# Patient Record
Sex: FEMALE | Race: WHITE | Employment: OTHER | URBAN - METROPOLITAN AREA
[De-identification: names, ages, dates, MRNs, and addresses within clinical notes are randomized per-mention and may not be internally consistent; named-entity substitution may affect disease eponyms.]

---

## 2017-06-16 PROBLEM — R03.0 ELEVATED BLOOD PRESSURE READING: Chronic | Status: ACTIVE | Noted: 2017-06-16

## 2017-06-16 PROBLEM — I10 HYPERTENSION: Status: ACTIVE | Noted: 2017-06-16

## 2017-06-16 PROBLEM — R00.1 BRADYCARDIA: Status: ACTIVE | Noted: 2017-06-16

## 2017-07-04 ENCOUNTER — HOSPITAL ENCOUNTER (EMERGENCY)
Age: 82
Discharge: HOME | End: 2017-07-04
Attending: EMERGENCY MEDICINE
Payer: MEDICARE

## 2017-07-04 VITALS
BODY MASS INDEX: 21.17 KG/M2 | HEART RATE: 56 BPM | DIASTOLIC BLOOD PRESSURE: 74 MMHG | TEMPERATURE: 98.2 F | RESPIRATION RATE: 16 BRPM | WEIGHT: 124 LBS | OXYGEN SATURATION: 99 % | HEIGHT: 64 IN | SYSTOLIC BLOOD PRESSURE: 152 MMHG

## 2017-07-04 DIAGNOSIS — S91.111A LACERATION OF RIGHT GREAT TOE WITHOUT DAMAGE TO NAIL, FOREIGN BODY PRESENCE UNSPECIFIED, INITIAL ENCOUNTER: Primary | ICD-10-CM

## 2017-07-04 PROCEDURE — 99283 EMERGENCY DEPT VISIT LOW MDM: CPT | Performed by: EMERGENCY MEDICINE

## 2017-07-04 PROCEDURE — 90471 IMMUNIZATION ADMIN: CPT | Performed by: EMERGENCY MEDICINE

## 2017-07-04 PROCEDURE — 90715 TDAP VACCINE 7 YRS/> IM: CPT | Performed by: EMERGENCY MEDICINE

## 2017-07-04 PROCEDURE — 74011250636 HC RX REV CODE- 250/636: Performed by: EMERGENCY MEDICINE

## 2017-07-04 RX ADMIN — TETANUS TOXOID, REDUCED DIPHTHERIA TOXOID AND ACELLULAR PERTUSSIS VACCINE, ADSORBED 0.5 ML: 5; 2.5; 8; 8; 2.5 SUSPENSION INTRAMUSCULAR at 21:11

## 2017-07-04 NOTE — ED TRIAGE NOTES
Pt states that she cut her right great toe while in the swimming pool today.   Laceration continues to bleed

## 2017-07-05 NOTE — ED PROVIDER NOTES
HPI Comments: Patient is an 79-year-old female who reports earlier today she was swimming in a pool with her grandchildren when she noticed that she was bleeding from her right great toe. She states they try to control the bleeding at home but were unable to. She denies knowing exactly when she injured herself but does report that the bottom of the pool is quite rough. Unknown last tetanus. Patient takes a daily aspirin but otherwise no blood thinning medications. Patient is a [de-identified] y.o. female presenting with skin laceration. The history is provided by the patient. No  was used. Laceration           Past Medical History:   Diagnosis Date    Hypertension        No past surgical history on file. Family History:   Problem Relation Age of Onset    Cancer Mother     Heart Disease Father        Social History     Social History    Marital status:      Spouse name: N/A    Number of children: N/A    Years of education: N/A     Occupational History    Not on file. Social History Main Topics    Smoking status: Never Smoker    Smokeless tobacco: Not on file    Alcohol use No    Drug use: No    Sexual activity: Not on file     Other Topics Concern    Not on file     Social History Narrative         ALLERGIES: Penicillins and Sulfa (sulfonamide antibiotics)    Review of Systems   Constitutional: Negative for fatigue. Respiratory: Negative for shortness of breath. Gastrointestinal: Negative for abdominal pain. Skin:        Bleeding from right great toe       Vitals:    07/04/17 1922   BP: 160/61   Pulse: (!) 53   Resp: 16   Temp: 98.2 °F (36.8 °C)   SpO2: 99%   Weight: 56.2 kg (124 lb)   Height: 5' 4\" (1.626 m)            Physical Exam   Constitutional: She is oriented to person, place, and time. She appears well-developed and well-nourished. No distress. HENT:   Head: Normocephalic and atraumatic.    Eyes: Conjunctivae and EOM are normal. Pupils are equal, round, and reactive to light. Neck: Normal range of motion. Neck supple. Cardiovascular: Normal rate, regular rhythm and normal heart sounds. Pulmonary/Chest: Effort normal and breath sounds normal. No respiratory distress. She has no wheezes. She has no rales. Abdominal: Soft. She exhibits no distension. There is no tenderness. There is no rebound. Musculoskeletal: Normal range of motion. She exhibits no edema or tenderness. Neurological: She is alert and oriented to person, place, and time. Skin: Skin is warm and dry. No rash noted. She is not diaphoretic. No significant bleeding noted from the right great toe. There is possibly a small abrasion near her nail and then the bottom of her right great toe there is a small red area. Does not appear to be a puncture wound and does not open up. Psychiatric: She has a normal mood and affect. Her behavior is normal.   Vitals reviewed. MDM  Number of Diagnoses or Management Options  Laceration of right great toe without damage to nail, foreign body presence unspecified, initial encounter: new and does not require workup  Diagnosis management comments: No bleeding noted. The toe was cleaned and triple antibiotic appointment applied. Tetanus updated. Return precautions discussed regarding signs and symptoms of infection.        Amount and/or Complexity of Data Reviewed  Review and summarize past medical records: yes  Independent visualization of images, tracings, or specimens: yes    Risk of Complications, Morbidity, and/or Mortality  Presenting problems: moderate  Diagnostic procedures: moderate  Management options: moderate    Patient Progress  Patient progress: improved    ED Course       Procedures

## 2017-07-05 NOTE — DISCHARGE INSTRUCTIONS
Cuts: Care Instructions  Your Care Instructions  A cut can happen anywhere on your body. Stitches, staples, skin adhesives, or pieces of tape called Steri-Strips are sometimes used to keep the edges of a cut together and help it heal. Steri-Strips can be used by themselves or with stitches or staples. Sometimes cuts are left open. If the cut went deep and through the skin, the doctor may have closed the cut in two layers. A deeper layer of stitches brings the deep part of the cut together. These stitches will dissolve and don't need to be removed. The upper layer closure, which could be stitches, staples, Steri-Strips, or adhesive, is what you see on the cut. A cut is often covered by a bandage. The doctor has checked you carefully, but problems can develop later. If you notice any problems or new symptoms, get medical treatment right away. Follow-up care is a key part of your treatment and safety. Be sure to make and go to all appointments, and call your doctor if you are having problems. It's also a good idea to know your test results and keep a list of the medicines you take. How can you care for yourself at home? If a cut is open or closed  · Prop up the sore area on a pillow anytime you sit or lie down during the next 3 days. Try to keep it above the level of your heart. This will help reduce swelling. · Keep the cut dry for the first 24 to 48 hours. After this, you can shower if your doctor okays it. Pat the cut dry. · Don't soak the cut, such as in a bathtub. Your doctor will tell you when it's safe to get the cut wet. · After the first 24 to 48 hours, clean the cut with soap and water 2 times a day unless your doctor gives you different instructions. ¨ Don't use hydrogen peroxide or alcohol, which can slow healing. ¨ You may cover the cut with a thin layer of petroleum jelly and a nonstick bandage.   ¨ If the doctor put a bandage over the cut, put on a new bandage after cleaning the cut or if the bandage gets wet or dirty. · Avoid any activity that could cause your cut to reopen. · Be safe with medicines. Read and follow all instructions on the label. ¨ If the doctor gave you a prescription medicine for pain, take it as prescribed. ¨ If you are not taking a prescription pain medicine, ask your doctor if you can take an over-the-counter medicine. If the cut is closed with stitches, staples, or Steri-Strips  · Follow the above instructions for open or closed cuts. · Do not remove the stitches or staples on your own. Your doctor will tell you when to come back to have the stitches or staples removed. · Leave Steri-Strips on until they fall off. If the cut is closed with a skin adhesive  · Follow the above instructions for open or closed cuts. · Leave the skin adhesive on your skin until it falls off on its own. This may take 5 to 10 days. · Do not scratch, rub, or pick at the adhesive. · Do not put the sticky part of a bandage directly on the adhesive. · Do not put any kind of ointment, cream, or lotion over the area. This can make the adhesive fall off too soon. Do not use hydrogen peroxide or alcohol, which can slow healing. When should you call for help? Call your doctor now or seek immediate medical care if:  · You have new pain, or your pain gets worse. · The skin near the cut is cold or pale or changes color. · You have tingling, weakness, or numbness near the cut. · The cut starts to bleed, and blood soaks through the bandage. Oozing small amounts of blood is normal.  · You have trouble moving the area near the cut. · You have symptoms of infection, such as:  ¨ Increased pain, swelling, warmth, or redness around the cut. ¨ Red streaks leading from the cut. ¨ Pus draining from the cut. ¨ A fever. Watch closely for changes in your health, and be sure to contact your doctor if:  · The cut reopens. · You do not get better as expected. Where can you learn more?   Go to http://tea-gavino.info/. Enter M735 in the search box to learn more about \"Cuts: Care Instructions. \"  Current as of: March 20, 2017  Content Version: 11.3  © 6074-2691 [employer]. Care instructions adapted under license by [employer] (which disclaims liability or warranty for this information). If you have questions about a medical condition or this instruction, always ask your healthcare professional. [place] [address] any warranty or liability for your use of this information.

## 2017-11-21 PROBLEM — Z01.810 PREOP CARDIOVASCULAR EXAM: Status: ACTIVE | Noted: 2017-11-21

## 2018-01-02 ENCOUNTER — HOSPITAL ENCOUNTER (OUTPATIENT)
Dept: SURGERY | Age: 83
Discharge: HOME | End: 2018-01-02
Payer: MEDICARE

## 2018-01-02 ENCOUNTER — HOSPITAL ENCOUNTER (OUTPATIENT)
Dept: PHYSICAL THERAPY | Age: 83
Discharge: HOME | End: 2018-01-02
Payer: MEDICARE

## 2018-01-02 VITALS
RESPIRATION RATE: 14 BRPM | SYSTOLIC BLOOD PRESSURE: 126 MMHG | HEART RATE: 62 BPM | DIASTOLIC BLOOD PRESSURE: 57 MMHG | WEIGHT: 119.5 LBS | TEMPERATURE: 97 F | OXYGEN SATURATION: 99 % | HEIGHT: 64 IN | BODY MASS INDEX: 20.4 KG/M2

## 2018-01-02 LAB
ANION GAP SERPL CALC-SCNC: 5 MMOL/L (ref 7–16)
APPEARANCE UR: ABNORMAL
APTT PPP: 24.8 SEC (ref 23.2–35.3)
BACTERIA SPEC CULT: NORMAL
BACTERIA URNS QL MICRO: ABNORMAL /HPF
BILIRUB UR QL: NEGATIVE
BUN SERPL-MCNC: 19 MG/DL (ref 8–23)
CALCIUM SERPL-MCNC: 9.3 MG/DL (ref 8.3–10.4)
CASTS URNS QL MICRO: ABNORMAL /LPF
CHLORIDE SERPL-SCNC: 102 MMOL/L (ref 98–107)
CO2 SERPL-SCNC: 32 MMOL/L (ref 21–32)
COLOR UR: YELLOW
CREAT SERPL-MCNC: 0.65 MG/DL (ref 0.6–1)
EPI CELLS #/AREA URNS HPF: ABNORMAL /HPF
ERYTHROCYTE [DISTWIDTH] IN BLOOD BY AUTOMATED COUNT: 13.6 % (ref 11.9–14.6)
GLUCOSE SERPL-MCNC: 91 MG/DL (ref 65–100)
GLUCOSE UR STRIP.AUTO-MCNC: NEGATIVE MG/DL
HCT VFR BLD AUTO: 37.4 % (ref 35.8–46.3)
HGB BLD-MCNC: 12 G/DL (ref 11.7–15.4)
HGB UR QL STRIP: ABNORMAL
INR PPP: 1
KETONES UR QL STRIP.AUTO: NEGATIVE MG/DL
LEUKOCYTE ESTERASE UR QL STRIP.AUTO: ABNORMAL
MCH RBC QN AUTO: 29.9 PG (ref 26.1–32.9)
MCHC RBC AUTO-ENTMCNC: 32.1 G/DL (ref 31.4–35)
MCV RBC AUTO: 93.3 FL (ref 79.6–97.8)
NITRITE UR QL STRIP.AUTO: NEGATIVE
PH UR STRIP: 6.5 [PH] (ref 5–9)
PLATELET # BLD AUTO: 168 K/UL (ref 150–450)
PMV BLD AUTO: 12 FL (ref 10.8–14.1)
POTASSIUM SERPL-SCNC: 3.9 MMOL/L (ref 3.5–5.1)
PROT UR STRIP-MCNC: NEGATIVE MG/DL
PROTHROMBIN TIME: 12.8 SEC (ref 11.5–14.5)
RBC # BLD AUTO: 4.01 M/UL (ref 4.05–5.25)
RBC #/AREA URNS HPF: ABNORMAL /HPF
SERVICE CMNT-IMP: NORMAL
SODIUM SERPL-SCNC: 139 MMOL/L (ref 136–145)
SP GR UR REFRACTOMETRY: 1.02 (ref 1–1.02)
UROBILINOGEN UR QL STRIP.AUTO: 0.2 EU/DL (ref 0.2–1)
WBC # BLD AUTO: 5.4 K/UL (ref 4.3–11.1)
WBC URNS QL MICRO: ABNORMAL /HPF

## 2018-01-02 PROCEDURE — 85610 PROTHROMBIN TIME: CPT | Performed by: ORTHOPAEDIC SURGERY

## 2018-01-02 PROCEDURE — G8979 MOBILITY GOAL STATUS: HCPCS

## 2018-01-02 PROCEDURE — 36415 COLL VENOUS BLD VENIPUNCTURE: CPT | Performed by: ORTHOPAEDIC SURGERY

## 2018-01-02 PROCEDURE — 80048 BASIC METABOLIC PNL TOTAL CA: CPT | Performed by: ORTHOPAEDIC SURGERY

## 2018-01-02 PROCEDURE — 85027 COMPLETE CBC AUTOMATED: CPT | Performed by: ORTHOPAEDIC SURGERY

## 2018-01-02 PROCEDURE — G8978 MOBILITY CURRENT STATUS: HCPCS

## 2018-01-02 PROCEDURE — G8980 MOBILITY D/C STATUS: HCPCS

## 2018-01-02 PROCEDURE — 97161 PT EVAL LOW COMPLEX 20 MIN: CPT

## 2018-01-02 PROCEDURE — 81001 URINALYSIS AUTO W/SCOPE: CPT | Performed by: ORTHOPAEDIC SURGERY

## 2018-01-02 PROCEDURE — 77030027138 HC INCENT SPIROMETER -A

## 2018-01-02 PROCEDURE — 87641 MR-STAPH DNA AMP PROBE: CPT | Performed by: ORTHOPAEDIC SURGERY

## 2018-01-02 PROCEDURE — 85730 THROMBOPLASTIN TIME PARTIAL: CPT | Performed by: ORTHOPAEDIC SURGERY

## 2018-01-02 RX ORDER — ASPIRIN 81 MG/1
81 TABLET ORAL DAILY
COMMUNITY
End: 2018-01-26

## 2018-01-02 RX ORDER — BISMUTH SUBSALICYLATE 262 MG
1 TABLET,CHEWABLE ORAL DAILY
COMMUNITY

## 2018-01-02 NOTE — PERIOP NOTES
Lab results within limits per anesthesia protocol; OK for surgery.      Recent Results (from the past 12 hour(s))   CBC W/O DIFF    Collection Time: 01/02/18 12:48 PM   Result Value Ref Range    WBC 5.4 4.3 - 11.1 K/uL    RBC 4.01 (L) 4.05 - 5.25 M/uL    HGB 12.0 11.7 - 15.4 g/dL    HCT 37.4 35.8 - 46.3 %    MCV 93.3 79.6 - 97.8 FL    MCH 29.9 26.1 - 32.9 PG    MCHC 32.1 31.4 - 35.0 g/dL    RDW 13.6 11.9 - 14.6 %    PLATELET 992 587 - 472 K/uL    MPV 12.0 10.8 - 05.8 FL   METABOLIC PANEL, BASIC    Collection Time: 01/02/18 12:48 PM   Result Value Ref Range    Sodium 139 136 - 145 mmol/L    Potassium 3.9 3.5 - 5.1 mmol/L    Chloride 102 98 - 107 mmol/L    CO2 32 21 - 32 mmol/L    Anion gap 5 (L) 7 - 16 mmol/L    Glucose 91 65 - 100 mg/dL    BUN 19 8 - 23 MG/DL    Creatinine 0.65 0.6 - 1.0 MG/DL    GFR est AA >60 >60 ml/min/1.73m2    GFR est non-AA >60 >60 ml/min/1.73m2    Calcium 9.3 8.3 - 10.4 MG/DL   PROTHROMBIN TIME + INR    Collection Time: 01/02/18 12:48 PM   Result Value Ref Range    Prothrombin time 12.8 11.5 - 14.5 sec    INR 1.0     PTT    Collection Time: 01/02/18 12:48 PM   Result Value Ref Range    aPTT 24.8 23.2 - 35.3 SEC   URINALYSIS W/ RFLX MICROSCOPIC    Collection Time: 01/02/18 12:48 PM   Result Value Ref Range    Color YELLOW      Appearance CLOUDY      Specific gravity 1.020 1.001 - 1.023      pH (UA) 6.5 5.0 - 9.0      Protein NEGATIVE  NEG mg/dL    Glucose NEGATIVE  mg/dL    Ketone NEGATIVE  NEG mg/dL    Bilirubin NEGATIVE  NEG      Blood SMALL (A) NEG      Urobilinogen 0.2 0.2 - 1.0 EU/dL    Nitrites NEGATIVE  NEG      Leukocyte Esterase LARGE (A) NEG      WBC 10-20 0 /hpf    RBC 0-3 0 /hpf    Epithelial cells 3-5 0 /hpf    Bacteria TRACE 0 /hpf    Casts 0-3 0 /lpf

## 2018-01-02 NOTE — PERIOP NOTES
Patient verified name, , and surgery as listed in [facility]. Type III surgery, walk in assessment complete. Labs per surgeon: cbc,bmp,PT/PTT and UA ; results pending  Labs per anesthesia protocol: type & screen DOS    EKG:performed by cardiologist. EKG (2017)Also,echo (2017) and cardiology note (17) in EMR for reference. Per Dr. Kelvin Bentley 17: Preoperative cardiovascular evaluation- lower risk for knee replacement-  continue beta blocker and other BP meds as currently taking. Take 81mg ASA thru surgery if OK with  Dr. Melissa Muhammad.     Follow-up Disposition:   Return in about 6 months (around 2018).     Michael Gabriel MD   2017      Hibiclens and instructions to return bottle on DOS given per hospital policy. Nasal Swab collected per MD order and instructions for Mupirocin nasal ointment if required. Patient provided with handouts including Guide to Surgery, Pain Management, Hand Hygiene, Blood Transfusion Education, and [facility] Brochure. Patient answered medical/surgical history questions at their best of ability. All prior to admission medications documented in [facility]. Original medication prescription bottle  visualized during patient appointment. Patient instructed to hold all vitamins 7 days prior to surgery and NSAIDS 5 days prior to surgery. Medications to be held: vitamins/supplements    Patient instructed to continue previous medications as prescribed prior to surgery and to take the following medications the day of surgery according to anesthesia guidelines with a small sip of water: amlodipine,citalopram and aspirin 81mg. Patient teach back successful and patient demonstrates knowledge of instruction.

## 2018-01-02 NOTE — PROGRESS NOTES
01/02/18 1200   Oxygen Therapy   O2 Sat (%) 99 %   Pulse via Oximetry 57 beats per minute   O2 Device Room air   Pre-Treatment   Breath Sounds Bilateral Clear   Pre FEV1 (liters) 1.5 liters   % Predicted 72   Incentive Spirometry Treatment   Actual Volume (ml) 1500 ml     Initial respiratory Assessment completed with pt. Pt was interviewed and evaluated in [facility] prior to surgery. Patient ID:  Angela Astorga  [id number]  [de-identified] y.o.  1935  Surgeon: Dr. Clifton Mejia  Date of Surgery: 1/24/2018  Procedure: Total Right Knee Arthroplasty  Primary Care Physician: Winter Pereira -[phone]  Specialists:                                  Pt instructed in the use of Incentive Spirometry. Pt instructed to bring Incentive Spirometer back on date of surgery & to start using Is upon return to pt room. Pt taught proper cough technique      History of smoking:  [address]   Quit date:    Secondhand smoke:NONE      Past procedures with Oxygen desaturation:NONE    Past Medical History:   Diagnosis Date    Arthritis     osteo    Hypertension     Psychiatric disorder     anxiety- citalopram daily                                                                                                                                                         Respiratory history:                                    DENIES SOB                                  Respiratory meds:                                         FAMILY PRESENT:            SPOUSE,                                             PAST SLEEP STUDY:              NO- PT WAS SCHEDULED TO SLEEP STUDY BUT SHE CANCELLED APPOINTMENT  HX OF ILENE:                          NO                                     ILENE assessment:                                                                                                 PHYSICAL EXAM   Body mass index is 20.51 kg/(m^2).    Visit Vitals    /57 (BP 1 Location: Left arm, BP Patient Position: Sitting)    Pulse 62    Temp 97 °F (36.1 °C)    Resp 14    Ht 5' 4\" (1.626 m)    Wt 54.2 kg (119 lb 8 oz)    SpO2 (P) 99%    BMI 20.51 kg/m2     Neck circumference: 37     cm    Loud snoring:YES       Witnessed apnea or wakening gasping or choking: , APNEA    Awakens with headaches:DENIES    Morning or daytime tiredness/ sleepiness:    TIRED     Dry mouth or sore throat in morning:YES    Barrientos stage:3    SACS score:16    STOP/BAN                              CPAP:NONE                CONT SAT HS             Referrals:    Pt. Phone Number:

## 2018-01-02 NOTE — PROGRESS NOTES
Stew Amos  : (89 y.o.) [facility] at [address], [place] [address].  [phone]       Physical Therapy Prehab Plan of Treatment and Evaluation Summary:2018    ICD-10: Treatment Diagnosis:   · Pain in Right Knee (M25.561)  · Stiffness of Right Knee, Not elsewhere classified (M25.661)  · Difficulty in walking, Not elsewhere classified (R26.2)  Precautions/Allergies:   Hydrocodone; Penicillins; and Sulfa (sulfonamide antibiotics)  MEDICAL/REFERRING DIAGNOSIS:  Unilateral primary osteoarthritis, right knee [M17.11]  REFERRING PHYSICIAN: Lore Elmore., *  DATE OF SURGERY: 18   Assessment:   Comments:  Pt. Plans to go to rehab.  is elderly and she is worried he can't help her. She was given list of facilities and our  phone number. She reports needing a left tka. She uses a cane occasionally. PROBLEM LIST (Impacting functional limitations):  Ms. Leny Hudson presents with the following right lower extremity(s) problems:  1. Strength  2. Range of Motion  3. Home Exercise Program  4. Pain   INTERVENTIONS PLANNED:  1. Home Exercise Program  2. Educational Discussion     TREATMENT PLAN: Effective Dates: 2018 TO 2018. Frequency/Duration: Patient to continue to perform home exercise program at least twice per day up until her surgery. GOALS: (Goals have been discussed and agreed upon with patient.)  Discharge Goals: Time Frame: 1 Day  1. Patient will demonstrate independence with a home exercise program designed to increase strength, range of motion and pain control to minimize functional deficits and optimize patient for total joint replacement. Rehabilitation Potential For Stated Goals: Good  Regarding Liz White's therapy, I certify that the treatment plan above will be carried out by a therapist or under their direction.   Thank you for this referral,  Onur Toledo, PT               HISTORY:   Present Symptoms:  Pain Intensity 1:  (7 at worst)  Pain Location 1: Knee   History of Present Injury/Illness (Reason for Referral):  Medical/Referring Diagnosis: Unilateral primary osteoarthritis, right knee [M17.11]   Past Medical History/Comorbidities:   Ms. Roberto Jolley  has a past medical history of Arthritis; Hypertension; and Psychiatric disorder. She also has no past medical history of Aneurysm ([facility] [address] Arrhythmia; Asthma; Autoimmune disease ([facility] [address] CAD (coronary artery disease); Cancer ([facility] [address] Chronic kidney disease; Chronic obstructive pulmonary disease ([facility] [address] Chronic pain; Coagulation disorder ([facility] [address] Diabetes ([facility] [address] Difficult intubation; Endocarditis; GERD (gastroesophageal reflux disease); Heart failure ([facility] [address] Liver disease; Malignant hyperthermia due to anesthesia; Morbid obesity ([facility] [address] Nausea & vomiting; Nicotine vapor product user; Non-nicotine vapor product user; Pseudocholinesterase deficiency; PUD (peptic ulcer disease); Rheumatic fever; Seizures ([facility] [address] Sleep apnea; Stroke [facility]); Thromboembolus ([facility] [address] or Thyroid disease. Ms. Roberto Jolley  has a past surgical history that includes hx knee arthroscopy (Right) and hx colonoscopy.   Social History/Living Environment:   Home Environment: Private residence  # Steps to Enter: 0  One/Two Story Residence: One story  Living Alone: No  Support Systems: Spouse/Significant Other/Partner  Patient Expects to be Discharged to[de-identified] Skilled nursing facility  Current DME Used/Available at Home: [employer], straight, Other (comment) (high commode)  Tub or Shower Type: Shower  Work/Activity:  retired  Dominant Side:  RIGHT  Current Medications:  See Pre-assessment nursing note   Number of Personal Factors/Comorbidities that affect the Plan of Care: 1-2: MODERATE COMPLEXITY   EXAMINATION:   ADLs (Current Functional Status):   Ambulation:  [x] Independent  [] Walk Indoors Only  [] Walk Outdoors  [] Use Assistive Device  [] Use Wheelchair Only Dressing:  [x] Independent  Requires Assistance from Someone for:  [] Sock/Shoes  [] Pants  [] Everything   Bathing/Showering:   [x] Independent  [] Requires Assistance from Someone  [] Sponge Bath Only Household Activities:  [x] Routine house and yard work  [] Light Housework Only  [] None   Observation/Orthostatic Postural Assessment:       ROM/Flexibility:   Gross Assessment: Yes  AROM: Within functional limits (left LE)                       RLE Assessment  RLE Assessment (WDL): Exceptions to WDL  RLE AROM  R Knee Flexion: 132  R Knee Extension: 5   Strength:   Gross Assessment: Yes  Strength: Generally decreased, functional (left LE)              RLE Strength  R Knee Flexion: 4  R Knee Extension: 4   Functional Mobility:    Gross Assessment: Yes    Gait Description (WDL): Exceptions to WDL  Stand to Sit: Independent, Additional time  Sit to Stand: Independent, Additional time  Distance (ft): 250 Feet (ft)  Ambulation - Level of Assistance: Independent  Speed/Joseline: Delayed  Stance: Right decreased  Gait Abnormalities: Antalgic          Balance:    Sitting: Intact  Standing: Intact   Body Structures Involved:  1. Bones  2. Joints  3. Muscles  4. Ligaments Body Functions Affected:  1. Movement Related Activities and Participation Affected:  1. Mobility   Number of elements that affect the Plan of Care: 1-2: LOW COMPLEXITY   CLINICAL PRESENTATION:   Presentation: Stable and uncomplicated: LOW COMPLEXITY   CLINICAL DECISION MAKING:   Outcome Measure: Tool Used: Lower Extremity Functional Scale (LEFS)  Score:  Initial: 53/80 Most Recent: X/80 (Date: -- )   Interpretation of Score: 20 questions each scored on a 5 point scale with 0 representing \"extreme difficulty or unable to perform\" and 4 representing \"no difficulty\". The lower the score, the greater the functional disability. 80/80 represents no disability. Minimal detectable change is 9 points. Score 80 79-65 64-49 48-33 32-17 16-1 0   Modifier CH CI CJ CK CL CM CN     ?  Mobility - Walking and Moving Around:     - CURRENT STATUS: CJ - 20%-39% impaired, limited or restricted    - GOAL STATUS: CJ - 20%-39% impaired, limited or restricted    - D/C STATUS:  CJ - 20%-39% impaired, limited or restricted  Medical Necessity:   · Ms. Roberto Jolley is expected to optimize her lower extremity strength and ROM in preparation for joint replacement surgery. Reason for Services/Other Comments:  · Achieve baseline assesment of musculoskeletal system, functional mobility and home environment. , educate in PT HEP in preparation for surgery, educate in hospital plan of care. Use of outcome tool(s) and clinical judgement create a POC that gives a: Clear prediction of patient's progress: LOW COMPLEXITY   TREATMENT:   Treatment/Session Assessment:  Patient was instructed in PT- HEP to increase strength and ROM in LEs. Answered all questions. · Post session pain:  No complaints  · Compliance with Program/Exercises: compliant most of the time.   Total Treatment Duration:  PT Patient Time In/Time Out  Time In: 1220  Time Out: [address], PT

## 2018-01-03 NOTE — PERIOP NOTES
1/2/2018  9:46 PM - [place], Lab In [facility]   Component Results   Component Value Flag Ref Range Units Status   Special Requests: NO SPECIAL REQUESTS     Final   Culture result:      Final   SA target not detected.                                 A MRSA NEGATIVE, SA NEGATIVE test result does not preclude MRSA or SA nasal colonization.

## 2018-01-08 NOTE — ADVANCED PRACTICE NURSE
Total Joint Surgery Preoperative Chart Review      Patient ID:  Abdifatah Kwong  [id number]  [de-identified] y.o.  1935  Surgeon: Dr. Oneida Cooper  Date of Surgery: 1/24/2018  Procedure: Total Right Knee Arthroplasty  Primary Care Physician: Crispin Joseph [phone]  Specialty Physician(s):      Subjective:   Abdifatah Kwong is a [de-identified] y.o. WHITE OR  female who presents for preoperative evaluation for Total Right Knee arthroplasty. This is a preoperative chart review note based on data collected by the nurse at the surgical Pre-Assessment visit. Past Medical History:   Diagnosis Date    Arthritis     osteo    Hypertension     Psychiatric disorder     anxiety- citalopram daily      Past Surgical History:   Procedure Laterality Date    HX COLONOSCOPY      HX KNEE ARTHROSCOPY Right      Family History   Problem Relation Age of Onset    Cancer Mother     Heart Disease Father       Social History   Substance Use Topics    Smoking status: Never Smoker    Smokeless tobacco: Never Used    Alcohol use 1.2 oz/week     2 Glasses of wine per week       Prior to Admission medications    Medication Sig Start Date End Date Taking? Authorizing Provider   multivitamin (ONE A DAY) tablet Take 1 Tab by mouth daily. Yes Historical Provider   aspirin delayed-release 81 mg tablet Take 81 mg by mouth daily. Per anesthesia protocol:instructed to take am of surgery. Yes Historical Provider   Omega-3-DHA-EPA-Fish Oil (FISH OIL) 1,000 mg (120 mg-180 mg) cap Take  by mouth. Yes Historical Provider   calcium-cholecalciferol, d3, (CALCIUM 600 + D) 600-125 mg-unit tab Take  by mouth. Yes Historical Provider   metoprolol succinate (TOPROL-XL) 25 mg XL tablet Take 25 mg by mouth nightly. Per anesthesia protocol:instructed to take am of surgery.   Indications: hypertension   Yes Historical Provider   temazepam (RESTORIL) 15 mg capsule TAKE ONE CAPSULE BY MOUTH AT BEDTIME,as needed 10/31/17  Yes Historical Provider losartan-hydroCHLOROthiazide (HYZAAR) 100-25 mg per tablet Take 1 Tab by mouth daily. 8/29/17  Yes Dayna Sanchez MD   amLODIPine (NORVASC) 2.5 mg tablet Take 1 Tab by mouth daily. Patient taking differently: Take 2.5 mg by mouth daily. Per anesthesia protocol:instructed to take am of surgery. 8/29/17  Yes Dayna Sanchez MD   citalopram (CELEXA) 10 mg tablet Take 10 mg by mouth daily. Per anesthesia protocol:instructed to take am of surgery. Indications: ANXIETY WITH DEPRESSION   Yes Historical Provider     Allergies   Allergen Reactions    Hydrocodone Unknown (comments)     \"too strong\"    Penicillins Itching    Sulfa (Sulfonamide Antibiotics) Itching          Objective:     Physical Exam:     Visit Vitals    /57 (BP 1 Location: Left arm, BP Patient Position: Sitting)    Pulse 62    Temp 97 °F (36.1 °C)    Resp 14    Ht 5' 4\" (1.626 m)    Wt 54.2 kg (119 lb 8 oz)    SpO2 (P) 99%    BMI 20.51 kg/m2       ECG:    EKG Results     None          Data Review:   Labs:   reviewed      Problem List:  )  Patient Active Problem List   Diagnosis Code    Malaise and fatigue R53.81, R53.83    Diaphoresis/hyperhidrosis R61    Abnormal EKG R94.31    Bradycardia R00.1    Elevated blood pressure reading R03.0    Preop cardiovascular exam Z01.810       Total Joint Surgery Pre-Assessment Recommendations:           He/she is a moderate risk for sleep apnea but refuses to have additional work up at this time. Will initiate perioperative ILENE precautions. Recommend continuous saturation monitoring hours of sleep, during hospitalization.             Signed By: SANAM Barnes    January 8, 2018

## 2018-01-18 NOTE — H&P
[id number] [facility]  Pre Operative History and Physical Exam    Patient ID:  Jennifer Kaminski  [id number]  [de-identified] y.o.  1935    Today: January 18, 2018       Assessment:   1. Arthritis of the right knee        Plan:    1. Proceed with scheduled Procedure(s) (LRB):  RIGHT KNEE ARTHROPLASTY TOTAL / FNB / CEFERINO (Right)            CC:  Right knee pain    HPI:   The patient has end stage arthritis of the right knee. The patient was evaluated and examined during a consultation prior to this office visit. There have been no changes to the patient's orthopedic condition since the initial consultation. The patient has failed previous conservative treatment for this condition including antiinflammatories , and lifestyle modifications. The necessity for joint replacement is present. The patient will be admitted the day of surgery for Procedure(s) (LRB):  RIGHT KNEE ARTHROPLASTY TOTAL / FNB / CEFERINO (Right)      Past Medical/Surgical History:  Past Medical History:   Diagnosis Date    Arthritis     osteo    Hypertension     Psychiatric disorder     anxiety- citalopram daily     Past Surgical History:   Procedure Laterality Date    HX COLONOSCOPY      HX KNEE ARTHROSCOPY Right         Allergies: Allergies   Allergen Reactions    Hydrocodone Unknown (comments)     \"too strong\"    Penicillins Itching    Sulfa (Sulfonamide Antibiotics) Itching        Objective:                    HEENT: NC/AT                   Lungs:  Unlabored respirations, clear breath sounds                    Heart:   RRR                    Abdomen: soft                   Extremities:  Pain with ROM of the right knee    Meds:   No current facility-administered medications for this encounter. Current Outpatient Prescriptions   Medication Sig    multivitamin (ONE A DAY) tablet Take 1 Tab by mouth daily.  aspirin delayed-release 81 mg tablet Take 81 mg by mouth daily. Per anesthesia protocol:instructed to take am of surgery.     Omega-3-DHA-EPA-Fish Oil (FISH OIL) 1,000 mg (120 mg-180 mg) cap Take  by mouth.  calcium-cholecalciferol, d3, (CALCIUM 600 + D) 600-125 mg-unit tab Take  by mouth.  metoprolol succinate (TOPROL-XL) 25 mg XL tablet Take 25 mg by mouth nightly. Per anesthesia protocol:instructed to take am of surgery. Indications: hypertension    temazepam (RESTORIL) 15 mg capsule TAKE ONE CAPSULE BY MOUTH AT BEDTIME,as needed    losartan-hydroCHLOROthiazide (HYZAAR) 100-25 mg per tablet Take 1 Tab by mouth daily.  amLODIPine (NORVASC) 2.5 mg tablet Take 1 Tab by mouth daily. (Patient taking differently: Take 2.5 mg by mouth daily. Per anesthesia protocol:instructed to take am of surgery.)    citalopram (CELEXA) 10 mg tablet Take 10 mg by mouth daily. Per anesthesia protocol:instructed to take am of surgery. Indications: ANXIETY WITH DEPRESSION         Labs:  Hospital Outpatient Visit on 01/02/2018   Component Date Value Ref Range Status    WBC 01/02/2018 5.4  4.3 - 11.1 K/uL Final    RBC 01/02/2018 4.01* 4.05 - 5.25 M/uL Final    HGB 01/02/2018 12.0  11.7 - 15.4 g/dL Final    HCT 01/02/2018 37.4  35.8 - 46.3 % Final    MCV 01/02/2018 93.3  79.6 - 97.8 FL Final    MCH 01/02/2018 29.9  26.1 - 32.9 PG Final    MCHC 01/02/2018 32.1  31.4 - 35.0 g/dL Final    RDW 01/02/2018 13.6  11.9 - 14.6 % Final    PLATELET 06/79/9573 391  150 - 450 K/uL Final    MPV 01/02/2018 12.0  10.8 - 14.1 FL Final    Sodium 01/02/2018 139  136 - 145 mmol/L Final    Potassium 01/02/2018 3.9  3.5 - 5.1 mmol/L Final    Chloride 01/02/2018 102  98 - 107 mmol/L Final    CO2 01/02/2018 32  21 - 32 mmol/L Final    Anion gap 01/02/2018 5* 7 - 16 mmol/L Final    Glucose 01/02/2018 91  65 - 100 mg/dL Final    Comment: 47 - 60 mg/dl Consistent with, but not fully diagnostic of hypoglycemia.   101 - 125 mg/dl Impaired fasting glucose/consistent with pre-diabetes mellitus  > 126 mg/dl Fasting glucose consistent with overt diabetes mellitus  BUN 01/02/2018 19  8 - 23 MG/DL Final    Creatinine 01/02/2018 0.65  0.6 - 1.0 MG/DL Final    GFR est AA 01/02/2018 >60  >60 ml/min/1.73m2 Final    GFR est non-AA 01/02/2018 >60  >60 ml/min/1.73m2 Final    Comment: (NOTE)  Estimated GFR is calculated using the Modification of Diet in Renal   Disease (MDRD) Study equation, reported for both  Americans   (GFRAA) and non- Americans (GFRNA), and normalized to 1.73m2   body surface area. The physician must decide which value applies to   the patient. The MDRD study equation should only be used in   individuals age 25 or older. It has not been validated for the   following: pregnant women, patients with serious comorbid conditions,   or on certain medications, or persons with extremes of body size,   muscle mass, or nutritional status.       Calcium 01/02/2018 9.3  8.3 - 10.4 MG/DL Final    Prothrombin time 01/02/2018 12.8  11.5 - 14.5 sec Final    ** Note new reference range and method **    INR 01/02/2018 1.0    Final    Comment: Suggested therapeutic INR range:  Venous thrombosis and embolus  2.0-3.0  Prosthetic heart valve         2.5-3.5  ** Note new reference range and method **      aPTT 01/02/2018 24.8  23.2 - 35.3 SEC Final    Comment: Heparin Therapeutic Range = 74 - 123 seconds  In addition to factor deficiency, monitoring heparin therapy, etc., evaluation of a prolonged aPTT result should include consideration of preanalytic variables such as heparin flush contamination, specimen integrity issues, etc.  ** Note new reference range and method **      Color 01/02/2018 YELLOW    Final    Appearance 01/02/2018 CLOUDY    Final    Specific gravity 01/02/2018 1.020  1.001 - 1.023   Final    pH (UA) 01/02/2018 6.5  5.0 - 9.0   Final    Protein 01/02/2018 NEGATIVE   NEG mg/dL Final    Glucose 01/02/2018 NEGATIVE   mg/dL Final    Ketone 01/02/2018 NEGATIVE   NEG mg/dL Final    Bilirubin 01/02/2018 NEGATIVE   NEG   Final    Blood 01/02/2018 SMALL* NEG   Final    Urobilinogen 01/02/2018 0.2  0.2 - 1.0 EU/dL Final    Nitrites 01/02/2018 NEGATIVE   NEG   Final    Leukocyte Esterase 01/02/2018 LARGE* NEG   Final    WBC 01/02/2018 10-20  0 /hpf Final    RESULTS VERIFIED MANUALLY    RBC 01/02/2018 0-3  0 /hpf Final    RESULTS VERIFIED MANUALLY    Epithelial cells 01/02/2018 3-5  0 /hpf Final    RESULTS VERIFIED MANUALLY    Bacteria 01/02/2018 TRACE  0 /hpf Final    RESULTS VERIFIED MANUALLY    Casts 01/02/2018 0-3  0 /lpf Final    Comment: HYALINE  RESULTS VERIFIED MANUALLY      Special Requests: 01/02/2018 NO SPECIAL REQUESTS    Final    Culture result: 01/02/2018 SA target not detected. A MRSA NEGATIVE, SA NEGATIVE test result does not preclude MRSA or SA nasal colonization.     Final                 Patient Active Problem List   Diagnosis Code    Malaise and fatigue R53.81, R53.83    Diaphoresis/hyperhidrosis R61    Abnormal EKG R94.31    Bradycardia R00.1    Elevated blood pressure reading R03.0    Preop cardiovascular exam Z01.810         Signed By: JACEK Crooks  January 18, 2018

## 2018-01-23 ENCOUNTER — ANESTHESIA EVENT (OUTPATIENT)
Dept: SURGERY | Age: 83
DRG: 470 | End: 2018-01-23
Payer: MEDICARE

## 2018-01-24 ENCOUNTER — HOSPITAL ENCOUNTER (INPATIENT)
Age: 83
LOS: 2 days | Discharge: NURSING, REHAB OR LONG-TERM CARE | DRG: 470 | End: 2018-01-26
Attending: ORTHOPAEDIC SURGERY | Admitting: ORTHOPAEDIC SURGERY
Payer: MEDICARE

## 2018-01-24 ENCOUNTER — ANESTHESIA (OUTPATIENT)
Dept: SURGERY | Age: 83
DRG: 470 | End: 2018-01-24
Payer: MEDICARE

## 2018-01-24 DIAGNOSIS — Z96.651 STATUS POST TOTAL RIGHT KNEE REPLACEMENT: Primary | ICD-10-CM

## 2018-01-24 DIAGNOSIS — R03.0 ELEVATED BLOOD PRESSURE READING: Chronic | ICD-10-CM

## 2018-01-24 PROBLEM — R00.1 BRADYCARDIA: Chronic | Status: ACTIVE | Noted: 2017-06-16

## 2018-01-24 LAB
ABO + RH BLD: NORMAL
APPEARANCE UR: CLEAR
BACTERIA URNS QL MICRO: 0 /HPF
BILIRUB UR QL: NEGATIVE
BLOOD GROUP ANTIBODIES SERPL: NORMAL
COLOR UR: YELLOW
GLUCOSE BLD STRIP.AUTO-MCNC: 96 MG/DL (ref 65–100)
GLUCOSE UR STRIP.AUTO-MCNC: NEGATIVE MG/DL
HGB BLD-MCNC: 9.6 G/DL (ref 11.7–15.4)
HGB UR QL STRIP: NEGATIVE
KETONES UR QL STRIP.AUTO: NEGATIVE MG/DL
LEUKOCYTE ESTERASE UR QL STRIP.AUTO: NEGATIVE
NITRITE UR QL STRIP.AUTO: NEGATIVE
PH UR STRIP: 7 [PH] (ref 5–9)
PROT UR STRIP-MCNC: NEGATIVE MG/DL
SP GR UR REFRACTOMETRY: 1.01 (ref 1–1.02)
SPECIMEN EXP DATE BLD: NORMAL
UROBILINOGEN UR QL STRIP.AUTO: 0.2 EU/DL (ref 0.2–1)

## 2018-01-24 PROCEDURE — 77030031139 HC SUT VCRL2 J&J -A: Performed by: ORTHOPAEDIC SURGERY

## 2018-01-24 PROCEDURE — 97161 PT EVAL LOW COMPLEX 20 MIN: CPT

## 2018-01-24 PROCEDURE — 77030035236 HC SUT PDS STRATFX BARB J&J -B: Performed by: ORTHOPAEDIC SURGERY

## 2018-01-24 PROCEDURE — 77030036688 HC BLNKT CLD THER S2SG -B

## 2018-01-24 PROCEDURE — 86580 TB INTRADERMAL TEST: CPT | Performed by: ORTHOPAEDIC SURGERY

## 2018-01-24 PROCEDURE — 74011250637 HC RX REV CODE- 250/637: Performed by: PHYSICIAN ASSISTANT

## 2018-01-24 PROCEDURE — 77030018836 HC SOL IRR NACL ICUM -A: Performed by: ORTHOPAEDIC SURGERY

## 2018-01-24 PROCEDURE — 77030006789 HC BLD SAW OSC STRY -C: Performed by: ORTHOPAEDIC SURGERY

## 2018-01-24 PROCEDURE — 74011250636 HC RX REV CODE- 250/636: Performed by: PHYSICIAN ASSISTANT

## 2018-01-24 PROCEDURE — 74011000302 HC RX REV CODE- 302: Performed by: ORTHOPAEDIC SURGERY

## 2018-01-24 PROCEDURE — 36415 COLL VENOUS BLD VENIPUNCTURE: CPT | Performed by: PHYSICIAN ASSISTANT

## 2018-01-24 PROCEDURE — 74011250636 HC RX REV CODE- 250/636: Performed by: ANESTHESIOLOGY

## 2018-01-24 PROCEDURE — 77030007880 HC KT SPN EPDRL BBMI -B: Performed by: ANESTHESIOLOGY

## 2018-01-24 PROCEDURE — 77030025452 HC KT TIB SZR TRTH DSP STRY -B: Performed by: ORTHOPAEDIC SURGERY

## 2018-01-24 PROCEDURE — 74011250636 HC RX REV CODE- 250/636

## 2018-01-24 PROCEDURE — 0SRC0JA REPLACEMENT OF RIGHT KNEE JOINT WITH SYNTHETIC SUBSTITUTE, UNCEMENTED, OPEN APPROACH: ICD-10-PCS | Performed by: ORTHOPAEDIC SURGERY

## 2018-01-24 PROCEDURE — 74011000250 HC RX REV CODE- 250

## 2018-01-24 PROCEDURE — 77030037364 HC TIB INST CR  DISP STRY -C: Performed by: ORTHOPAEDIC SURGERY

## 2018-01-24 PROCEDURE — 77030011208: Performed by: ORTHOPAEDIC SURGERY

## 2018-01-24 PROCEDURE — 77030012935 HC DRSG AQUACEL BMS -B: Performed by: ORTHOPAEDIC SURGERY

## 2018-01-24 PROCEDURE — 94762 N-INVAS EAR/PLS OXIMTRY CONT: CPT

## 2018-01-24 PROCEDURE — 77030006720 HC BLD PAT RMR ZIMM -B: Performed by: ORTHOPAEDIC SURGERY

## 2018-01-24 PROCEDURE — 77030003602 HC NDL NRV BLK BBMI -B: Performed by: ANESTHESIOLOGY

## 2018-01-24 PROCEDURE — 97165 OT EVAL LOW COMPLEX 30 MIN: CPT

## 2018-01-24 PROCEDURE — 94760 N-INVAS EAR/PLS OXIMETRY 1: CPT

## 2018-01-24 PROCEDURE — 76210000006 HC OR PH I REC 0.5 TO 1 HR: Performed by: ORTHOPAEDIC SURGERY

## 2018-01-24 PROCEDURE — C1713 ANCHOR/SCREW BN/BN,TIS/BN: HCPCS | Performed by: ORTHOPAEDIC SURGERY

## 2018-01-24 PROCEDURE — C1776 JOINT DEVICE (IMPLANTABLE): HCPCS | Performed by: ORTHOPAEDIC SURGERY

## 2018-01-24 PROCEDURE — 77030008467 HC STPLR SKN COVD -B: Performed by: ORTHOPAEDIC SURGERY

## 2018-01-24 PROCEDURE — 82962 GLUCOSE BLOOD TEST: CPT

## 2018-01-24 PROCEDURE — 76060000034 HC ANESTHESIA 1.5 TO 2 HR: Performed by: ORTHOPAEDIC SURGERY

## 2018-01-24 PROCEDURE — 76942 ECHO GUIDE FOR BIOPSY: CPT | Performed by: ORTHOPAEDIC SURGERY

## 2018-01-24 PROCEDURE — 77030002912 HC SUT ETHBND J&J -A: Performed by: ORTHOPAEDIC SURGERY

## 2018-01-24 PROCEDURE — 85018 HEMOGLOBIN: CPT | Performed by: PHYSICIAN ASSISTANT

## 2018-01-24 PROCEDURE — 86900 BLOOD TYPING SEROLOGIC ABO: CPT | Performed by: PHYSICIAN ASSISTANT

## 2018-01-24 PROCEDURE — 74011250636 HC RX REV CODE- 250/636: Performed by: ORTHOPAEDIC SURGERY

## 2018-01-24 PROCEDURE — 74011000250 HC RX REV CODE- 250: Performed by: ORTHOPAEDIC SURGERY

## 2018-01-24 PROCEDURE — 77030002966 HC SUT PDS J&J -A: Performed by: ORTHOPAEDIC SURGERY

## 2018-01-24 PROCEDURE — 77030019557 HC ELECTRD VES SEAL MEDT -F: Performed by: ORTHOPAEDIC SURGERY

## 2018-01-24 PROCEDURE — 77030037363 HC FEM INST CR  DISP STRY -C: Performed by: ORTHOPAEDIC SURGERY

## 2018-01-24 PROCEDURE — 76010010054 HC POST OP PAIN BLOCK: Performed by: ORTHOPAEDIC SURGERY

## 2018-01-24 PROCEDURE — 77030003665 HC NDL SPN BBMI -A: Performed by: ANESTHESIOLOGY

## 2018-01-24 PROCEDURE — 77030011640 HC PAD GRND REM COVD -A: Performed by: ORTHOPAEDIC SURGERY

## 2018-01-24 PROCEDURE — 76010000162 HC OR TIME 1.5 TO 2 HR INTENSV-TIER 1: Performed by: ORTHOPAEDIC SURGERY

## 2018-01-24 PROCEDURE — 77030034849: Performed by: ORTHOPAEDIC SURGERY

## 2018-01-24 PROCEDURE — 77030013727 HC IRR FAN PULSVC ZIMM -B: Performed by: ORTHOPAEDIC SURGERY

## 2018-01-24 PROCEDURE — 77030032490 HC SLV COMPR SCD KNE COVD -B

## 2018-01-24 PROCEDURE — 65270000029 HC RM PRIVATE

## 2018-01-24 PROCEDURE — 74011000258 HC RX REV CODE- 258: Performed by: ORTHOPAEDIC SURGERY

## 2018-01-24 PROCEDURE — 77030020782 HC GWN BAIR PAWS FLX 3M -B: Performed by: ANESTHESIOLOGY

## 2018-01-24 PROCEDURE — 81003 URINALYSIS AUTO W/O SCOPE: CPT | Performed by: PHYSICIAN ASSISTANT

## 2018-01-24 DEVICE — (D)CEMENT BNE HV R 40GM -- DUPE USE ITEM 353850: Type: IMPLANTABLE DEVICE | Site: KNEE | Status: FUNCTIONAL

## 2018-01-24 DEVICE — CRUCIATE RETAINING FEMORAL
Type: IMPLANTABLE DEVICE | Site: KNEE | Status: FUNCTIONAL
Brand: TRIATHLON

## 2018-01-24 DEVICE — PATELLA
Type: IMPLANTABLE DEVICE | Site: KNEE | Status: FUNCTIONAL
Brand: TRIATHLON

## 2018-01-24 DEVICE — TIBIAL BEARING INSERT
Type: IMPLANTABLE DEVICE | Site: KNEE | Status: FUNCTIONAL
Brand: TRIATHLON

## 2018-01-24 DEVICE — TIBIAL COMPONENT
Type: IMPLANTABLE DEVICE | Site: KNEE | Status: FUNCTIONAL
Brand: TRIATHLON

## 2018-01-24 RX ORDER — HYDROMORPHONE HYDROCHLORIDE 2 MG/1
2 TABLET ORAL
Status: DISCONTINUED | OUTPATIENT
Start: 2018-01-24 | End: 2018-01-26 | Stop reason: HOSPADM

## 2018-01-24 RX ORDER — DEXAMETHASONE SODIUM PHOSPHATE 100 MG/10ML
10 INJECTION INTRAMUSCULAR; INTRAVENOUS ONCE
Status: ACTIVE | OUTPATIENT
Start: 2018-01-25 | End: 2018-01-26

## 2018-01-24 RX ORDER — EPHEDRINE SULFATE 50 MG/ML
INJECTION, SOLUTION INTRAVENOUS AS NEEDED
Status: DISCONTINUED | OUTPATIENT
Start: 2018-01-24 | End: 2018-01-24 | Stop reason: HOSPADM

## 2018-01-24 RX ORDER — MIDAZOLAM HYDROCHLORIDE 1 MG/ML
INJECTION, SOLUTION INTRAMUSCULAR; INTRAVENOUS AS NEEDED
Status: DISCONTINUED | OUTPATIENT
Start: 2018-01-24 | End: 2018-01-24 | Stop reason: HOSPADM

## 2018-01-24 RX ORDER — AMOXICILLIN 250 MG
2 CAPSULE ORAL DAILY
Status: DISCONTINUED | OUTPATIENT
Start: 2018-01-25 | End: 2018-01-26 | Stop reason: HOSPADM

## 2018-01-24 RX ORDER — SODIUM CHLORIDE 9 MG/ML
100 INJECTION, SOLUTION INTRAVENOUS CONTINUOUS
Status: DISPENSED | OUTPATIENT
Start: 2018-01-24 | End: 2018-01-25

## 2018-01-24 RX ORDER — ASPIRIN 325 MG
325 TABLET, DELAYED RELEASE (ENTERIC COATED) ORAL EVERY 12 HOURS
Status: DISCONTINUED | OUTPATIENT
Start: 2018-01-24 | End: 2018-01-26 | Stop reason: HOSPADM

## 2018-01-24 RX ORDER — ACETAMINOPHEN 500 MG
1000 TABLET ORAL EVERY 6 HOURS
Status: DISCONTINUED | OUTPATIENT
Start: 2018-01-25 | End: 2018-01-26 | Stop reason: HOSPADM

## 2018-01-24 RX ORDER — OXYCODONE AND ACETAMINOPHEN 10; 325 MG/1; MG/1
1 TABLET ORAL AS NEEDED
Status: DISCONTINUED | OUTPATIENT
Start: 2018-01-24 | End: 2018-01-24 | Stop reason: HOSPADM

## 2018-01-24 RX ORDER — PROPOFOL 10 MG/ML
INJECTION, EMULSION INTRAVENOUS
Status: DISCONTINUED | OUTPATIENT
Start: 2018-01-24 | End: 2018-01-24 | Stop reason: HOSPADM

## 2018-01-24 RX ORDER — HYDROMORPHONE HYDROCHLORIDE 2 MG/ML
0.5 INJECTION, SOLUTION INTRAMUSCULAR; INTRAVENOUS; SUBCUTANEOUS
Status: DISCONTINUED | OUTPATIENT
Start: 2018-01-24 | End: 2018-01-24 | Stop reason: HOSPADM

## 2018-01-24 RX ORDER — DIPHENHYDRAMINE HCL 25 MG
25 CAPSULE ORAL
Status: DISCONTINUED | OUTPATIENT
Start: 2018-01-24 | End: 2018-01-26 | Stop reason: HOSPADM

## 2018-01-24 RX ORDER — FENTANYL CITRATE 50 UG/ML
100 INJECTION, SOLUTION INTRAMUSCULAR; INTRAVENOUS ONCE
Status: COMPLETED | OUTPATIENT
Start: 2018-01-24 | End: 2018-01-24

## 2018-01-24 RX ORDER — MIDAZOLAM HYDROCHLORIDE 1 MG/ML
2 INJECTION, SOLUTION INTRAMUSCULAR; INTRAVENOUS ONCE
Status: COMPLETED | OUTPATIENT
Start: 2018-01-24 | End: 2018-01-24

## 2018-01-24 RX ORDER — KETOROLAC TROMETHAMINE 30 MG/ML
INJECTION, SOLUTION INTRAMUSCULAR; INTRAVENOUS AS NEEDED
Status: DISCONTINUED | OUTPATIENT
Start: 2018-01-24 | End: 2018-01-24 | Stop reason: HOSPADM

## 2018-01-24 RX ORDER — ASPIRIN 325 MG
325 TABLET, DELAYED RELEASE (ENTERIC COATED) ORAL EVERY 12 HOURS
Qty: 70 TAB | Refills: 0 | Status: SHIPPED | OUTPATIENT
Start: 2018-01-24 | End: 2018-02-28

## 2018-01-24 RX ORDER — OXYCODONE HYDROCHLORIDE 5 MG/1
5 TABLET ORAL
Status: DISCONTINUED | OUTPATIENT
Start: 2018-01-24 | End: 2018-01-24 | Stop reason: HOSPADM

## 2018-01-24 RX ORDER — CEFAZOLIN SODIUM/WATER 2 G/20 ML
2 SYRINGE (ML) INTRAVENOUS ONCE
Status: COMPLETED | OUTPATIENT
Start: 2018-01-24 | End: 2018-01-24

## 2018-01-24 RX ORDER — ONDANSETRON 2 MG/ML
INJECTION INTRAMUSCULAR; INTRAVENOUS AS NEEDED
Status: DISCONTINUED | OUTPATIENT
Start: 2018-01-24 | End: 2018-01-24 | Stop reason: HOSPADM

## 2018-01-24 RX ORDER — NALOXONE HYDROCHLORIDE 0.4 MG/ML
.2-.4 INJECTION, SOLUTION INTRAMUSCULAR; INTRAVENOUS; SUBCUTANEOUS
Status: DISCONTINUED | OUTPATIENT
Start: 2018-01-24 | End: 2018-01-26 | Stop reason: HOSPADM

## 2018-01-24 RX ORDER — SODIUM CHLORIDE, SODIUM LACTATE, POTASSIUM CHLORIDE, CALCIUM CHLORIDE 600; 310; 30; 20 MG/100ML; MG/100ML; MG/100ML; MG/100ML
75 INJECTION, SOLUTION INTRAVENOUS CONTINUOUS
Status: DISCONTINUED | OUTPATIENT
Start: 2018-01-24 | End: 2018-01-24 | Stop reason: HOSPADM

## 2018-01-24 RX ORDER — METOPROLOL SUCCINATE 25 MG/1
25 TABLET, EXTENDED RELEASE ORAL
Status: DISCONTINUED | OUTPATIENT
Start: 2018-01-24 | End: 2018-01-26 | Stop reason: HOSPADM

## 2018-01-24 RX ORDER — ACETAMINOPHEN 10 MG/ML
1000 INJECTION, SOLUTION INTRAVENOUS ONCE
Status: COMPLETED | OUTPATIENT
Start: 2018-01-24 | End: 2018-01-25

## 2018-01-24 RX ORDER — SODIUM CHLORIDE 0.9 % (FLUSH) 0.9 %
5-10 SYRINGE (ML) INJECTION EVERY 8 HOURS
Status: DISCONTINUED | OUTPATIENT
Start: 2018-01-24 | End: 2018-01-26 | Stop reason: HOSPADM

## 2018-01-24 RX ORDER — MORPHINE SULFATE 10 MG/ML
INJECTION, SOLUTION INTRAMUSCULAR; INTRAVENOUS AS NEEDED
Status: DISCONTINUED | OUTPATIENT
Start: 2018-01-24 | End: 2018-01-24 | Stop reason: HOSPADM

## 2018-01-24 RX ORDER — SODIUM CHLORIDE 0.9 % (FLUSH) 0.9 %
5-10 SYRINGE (ML) INJECTION AS NEEDED
Status: DISCONTINUED | OUTPATIENT
Start: 2018-01-24 | End: 2018-01-26 | Stop reason: HOSPADM

## 2018-01-24 RX ORDER — HYDROMORPHONE HYDROCHLORIDE 2 MG/1
2 TABLET ORAL
Qty: 40 TAB | Refills: 0 | Status: SHIPPED | OUTPATIENT
Start: 2018-01-24 | End: 2019-01-30

## 2018-01-24 RX ORDER — AMLODIPINE BESYLATE 5 MG/1
2.5 TABLET ORAL DAILY
Status: DISCONTINUED | OUTPATIENT
Start: 2018-01-25 | End: 2018-01-26 | Stop reason: HOSPADM

## 2018-01-24 RX ORDER — ROPIVACAINE HYDROCHLORIDE 5 MG/ML
INJECTION, SOLUTION EPIDURAL; INFILTRATION; PERINEURAL AS NEEDED
Status: DISCONTINUED | OUTPATIENT
Start: 2018-01-24 | End: 2018-01-24 | Stop reason: HOSPADM

## 2018-01-24 RX ORDER — HYDROMORPHONE HYDROCHLORIDE 2 MG/1
2 TABLET ORAL
Status: DISCONTINUED | OUTPATIENT
Start: 2018-01-24 | End: 2018-01-24

## 2018-01-24 RX ORDER — DEXAMETHASONE SODIUM PHOSPHATE 100 MG/10ML
INJECTION INTRAMUSCULAR; INTRAVENOUS AS NEEDED
Status: DISCONTINUED | OUTPATIENT
Start: 2018-01-24 | End: 2018-01-24 | Stop reason: HOSPADM

## 2018-01-24 RX ORDER — CEFAZOLIN SODIUM/WATER 2 G/20 ML
2 SYRINGE (ML) INTRAVENOUS EVERY 8 HOURS
Status: COMPLETED | OUTPATIENT
Start: 2018-01-24 | End: 2018-01-25

## 2018-01-24 RX ORDER — SODIUM CHLORIDE 0.9 % (FLUSH) 0.9 %
5-10 SYRINGE (ML) INJECTION AS NEEDED
Status: DISCONTINUED | OUTPATIENT
Start: 2018-01-24 | End: 2018-01-24 | Stop reason: HOSPADM

## 2018-01-24 RX ORDER — HYDROMORPHONE HYDROCHLORIDE 2 MG/ML
1 INJECTION, SOLUTION INTRAMUSCULAR; INTRAVENOUS; SUBCUTANEOUS
Status: DISCONTINUED | OUTPATIENT
Start: 2018-01-24 | End: 2018-01-26 | Stop reason: HOSPADM

## 2018-01-24 RX ORDER — CITALOPRAM 20 MG/1
10 TABLET, FILM COATED ORAL DAILY
Status: DISCONTINUED | OUTPATIENT
Start: 2018-01-25 | End: 2018-01-26 | Stop reason: HOSPADM

## 2018-01-24 RX ORDER — SODIUM CHLORIDE 9 MG/ML
INJECTION INTRAMUSCULAR; INTRAVENOUS; SUBCUTANEOUS AS NEEDED
Status: DISCONTINUED | OUTPATIENT
Start: 2018-01-24 | End: 2018-01-24 | Stop reason: HOSPADM

## 2018-01-24 RX ORDER — TEMAZEPAM 15 MG/1
15 CAPSULE ORAL
Status: DISCONTINUED | OUTPATIENT
Start: 2018-01-24 | End: 2018-01-26 | Stop reason: HOSPADM

## 2018-01-24 RX ORDER — TRANEXAMIC ACID 100 MG/ML
INJECTION, SOLUTION INTRAVENOUS AS NEEDED
Status: DISCONTINUED | OUTPATIENT
Start: 2018-01-24 | End: 2018-01-24 | Stop reason: HOSPADM

## 2018-01-24 RX ORDER — ONDANSETRON 2 MG/ML
4 INJECTION INTRAMUSCULAR; INTRAVENOUS
Status: DISCONTINUED | OUTPATIENT
Start: 2018-01-24 | End: 2018-01-26 | Stop reason: HOSPADM

## 2018-01-24 RX ADMIN — PROPOFOL 50 MCG/KG/MIN: 10 INJECTION, EMULSION INTRAVENOUS at 07:55

## 2018-01-24 RX ADMIN — HYDROMORPHONE HYDROCHLORIDE 2 MG: 2 TABLET ORAL at 20:30

## 2018-01-24 RX ADMIN — ONDANSETRON 4 MG: 2 INJECTION INTRAMUSCULAR; INTRAVENOUS at 08:15

## 2018-01-24 RX ADMIN — TEMAZEPAM 15 MG: 15 CAPSULE ORAL at 20:29

## 2018-01-24 RX ADMIN — SODIUM CHLORIDE 100 ML/HR: 900 INJECTION, SOLUTION INTRAVENOUS at 12:45

## 2018-01-24 RX ADMIN — ACETAMINOPHEN 1000 MG: 10 INJECTION, SOLUTION INTRAVENOUS at 18:44

## 2018-01-24 RX ADMIN — TUBERCULIN PURIFIED PROTEIN DERIVATIVE 5 UNITS: 5 INJECTION, SOLUTION INTRADERMAL at 06:10

## 2018-01-24 RX ADMIN — TRANEXAMIC ACID 1 G: 100 INJECTION, SOLUTION INTRAVENOUS at 07:45

## 2018-01-24 RX ADMIN — DEXAMETHASONE SODIUM PHOSPHATE 5 MG: 100 INJECTION INTRAMUSCULAR; INTRAVENOUS at 07:54

## 2018-01-24 RX ADMIN — HYDROMORPHONE HYDROCHLORIDE 2 MG: 2 TABLET ORAL at 11:59

## 2018-01-24 RX ADMIN — MIDAZOLAM HYDROCHLORIDE 1 MG: 1 INJECTION, SOLUTION INTRAMUSCULAR; INTRAVENOUS at 07:05

## 2018-01-24 RX ADMIN — SODIUM CHLORIDE, SODIUM LACTATE, POTASSIUM CHLORIDE, AND CALCIUM CHLORIDE: 600; 310; 30; 20 INJECTION, SOLUTION INTRAVENOUS at 07:48

## 2018-01-24 RX ADMIN — PROPOFOL: 10 INJECTION, EMULSION INTRAVENOUS at 08:49

## 2018-01-24 RX ADMIN — HYDROMORPHONE HYDROCHLORIDE 2 MG: 2 TABLET ORAL at 16:25

## 2018-01-24 RX ADMIN — SODIUM CHLORIDE, SODIUM LACTATE, POTASSIUM CHLORIDE, AND CALCIUM CHLORIDE 75 ML/HR: 600; 310; 30; 20 INJECTION, SOLUTION INTRAVENOUS at 06:11

## 2018-01-24 RX ADMIN — ROPIVACAINE HYDROCHLORIDE 20 ML: 5 INJECTION, SOLUTION EPIDURAL; INFILTRATION; PERINEURAL at 07:06

## 2018-01-24 RX ADMIN — METOPROLOL SUCCINATE 25 MG: 25 TABLET, EXTENDED RELEASE ORAL at 20:30

## 2018-01-24 RX ADMIN — Medication 2 G: at 16:26

## 2018-01-24 RX ADMIN — Medication 2 G: at 07:33

## 2018-01-24 RX ADMIN — MIDAZOLAM HYDROCHLORIDE 1 MG: 1 INJECTION, SOLUTION INTRAMUSCULAR; INTRAVENOUS at 07:41

## 2018-01-24 RX ADMIN — MIDAZOLAM HYDROCHLORIDE 0.5 MG: 1 INJECTION, SOLUTION INTRAMUSCULAR; INTRAVENOUS at 07:58

## 2018-01-24 RX ADMIN — ASPIRIN 325 MG: 325 TABLET, DELAYED RELEASE ORAL at 20:30

## 2018-01-24 RX ADMIN — EPHEDRINE SULFATE 10 MG: 50 INJECTION, SOLUTION INTRAVENOUS at 08:34

## 2018-01-24 RX ADMIN — FENTANYL CITRATE 50 MCG: 50 INJECTION INTRAMUSCULAR; INTRAVENOUS at 07:05

## 2018-01-24 RX ADMIN — DIPHENHYDRAMINE HYDROCHLORIDE 25 MG: 25 CAPSULE ORAL at 22:39

## 2018-01-24 NOTE — H&P
The patient has end stage arthritis of the right knee. The patient was see and examined and there are no changes to the patient's orthopedic condition. They have tried conservative treatment for this condition; including antiinflammatories and lifestyle modifications and have failed. The necessity for the joint replacement is still present, and the H&P from the office is still current.  The patient will be admitted today for Procedure(s) (LRB):  RIGHT KNEE ARTHROPLASTY TOTAL / FNB / Oren Gilbert (Right)

## 2018-01-24 NOTE — DISCHARGE INSTRUCTIONS
[id number] [facility]   Patient Discharge Instructions    Hansel Casanova / [id number] : 1935    Admitted 2018 Discharged: 2018     IF YOU HAVE ANY PROBLEMS ONCE YOU ARE AT HOME CALL THE FOLLOWING NUMBERS:   Main office number: [phone]      Medications    · The medications you are to continue on are listed on the medication reconciliation sheet. · Narcotic pain medications as well as supplemental iron can cause constipation. If this occurs try stopping the narcotic pain medication and/or the iron. · It is important that you take the medication exactly as they are prescribed. · Medications which increase your risk of blood clots are listed to stop for 5 weeks after surgery as well as medications or supplements which increase your risk of bleeding complications. · Keep your medication in the bottles provided by the pharmacist and keep a list of the medication names, dosages, and times to be taken in your wallet. · Do not take other medications without consulting your doctor. Important Information    Do NOT smoke as this will greatly increase your risk of infection! Resume your prehospital diet. If you have excessive nausea or vomitting call your doctor's office     Leg swelling and warmth is normal for 6 months after surgery. If you experience swelling in your leg elevate you leg while laying down with your toes above your heart. If you have sudden onset severe swelling with leg pain call our office. Use Vinicio Hose stockings until we see you in the office for your follow up appointment. The stitches deep inside take approximately 6 months to dissolve. There will be sharp shooting, stinging and burning pain. This is normal and will resolve between 3-6 months after surgery. Difficulty sleeping is normal following total Knee and Hip replacement. You may try melatonin, an over-the-counter sleep aid or benadryl to help with sleep.  Most patients will resume sleeping through the night 8 weeks after surgery. Home Physical Therapy is arranged. Home Health will contact you within 48 hrs of discharge that you have chosen. If you have not received a call within this time frame please contact that provider you chose. You should be given this information before you leave the hospital.     You are at a risk for falls. Use the rolling walker when walking. Patients who have had a joint replacement should not drive if they are still taking narcotic pain mediation during the daytime hours. Most patients wean themselves off of pain medication within 2-5 weeks after surgery. When to Call the office    - If you have a temperature greater then 101  - Uncontrolled vomiting   - Loose control of your bladder or bowel function  - Are unable to bear any wieght   - Need a pain medication refill     Information obtained by :  I understand that if any problems occur once I am at home I am to contact my physician. I understand and acknowledge receipt of the instructions indicated above.                                                                                                                                            Physician's or R.N.'s Signature                                                                  Date/Time                                                                                                                                              Patient or Representative Signature                                                          Date/Time

## 2018-01-24 NOTE — PROGRESS NOTES
TRANSFER - IN REPORT:    Verbal report received from Josiah Gutiérrez RN on [employer]  being received from PACU for routine post - op      Report consisted of patients Situation, Background, Assessment and   Recommendations(SBAR). Information from the following report(s) SBAR, Intake/Output and MAR was reviewed with the receiving nurse. Opportunity for questions and clarification was provided. Assessment completed upon patients arrival to unit and care assumed.

## 2018-01-24 NOTE — PROGRESS NOTES
Problem: Mobility Impaired (Adult and Pediatric)  Goal: *Acute Goals and Plan of Care (Insert Text)  GOALS (1-4 days):  (1.)Ms. Maureen Justin will move from supine to sit and sit to supine  in bed with SUPERVISION. (2.)Ms. Maureen Justin will transfer from bed to chair and chair to bed with SUPERVISION using the least restrictive device. (3.)Ms. Maureen Justin will ambulate with SUPERVISION for 100 feet with the least restrictive device. (4.)Ms. Maureen Justin will ambulate up/down 4 steps with bilateral  railing with CONTACT GUARD ASSIST with no device. (5.)Ms. Maureen Justin will increase right knee ROM to 5°-80°.  ________________________________________________________________________________________________    PHYSICAL THERAPY [facility]: Initial Assessment, AM 1/24/2018  INPATIENT: Hospital Day: 1  Payor: Anita Fernandez / Plan: [address] [place] [address] HMO / Product Type: [employer] Medicare /      NAME/AGE/GENDER: Padmini Johnson is a [de-identified] y.o. female   PRIMARY DIAGNOSIS:  Primary osteoarthritis of one knee, right [M17.11]   Procedure(s) and Anesthesia Type:     * RIGHT KNEE ARTHROPLASTY TOTAL / FNB / Agnes Severs - Spinal (Right)  ICD-10: Treatment Diagnosis:    · Pain in Right Knee (M25.561)  · Stiffness of Right Knee, Not elsewhere classified (M25.661)  · Difficulty in walking, Not elsewhere classified (R26.2)  · Other abnormalities of gait and mobility (R26.89)      ASSESSMENT:     Ms. Maureen Justin presents S/P R TKA and demonstrates decreased R LE strength and ROM, standing balance, functional mobility and TKA awareness. She would benefit from further PT while here to address these deficits. She plans on going to [facility] for rehab at [facility] because her elderly spouse cannot offer physical assistance. This section established at most recent assessment   PROBLEM LIST (Impairments causing functional limitations):  1. Decreased Strength  2. Decreased Transfer Abilities  3. Decreased Ambulation Ability/Technique  4. Decreased Balance  5.  Increased Pain  6. Decreased Activity Tolerance  7. Increased Fatigue  8. Decreased Flexibility/Joint Mobility  9. Decreased Knowledge of Precautions  10. Decreased [place] with Home Exercise Program   INTERVENTIONS PLANNED: (Benefits and precautions of physical therapy have been discussed with the patient.)  1. Balance Exercise  2. Bed Mobility  3. Cold  4. Gait Training  5. Home Exercise Program (HEP)  6. Therapeutic Exercise/Strengthening  7. Transfer Training  8. TKA education  9. Range of Motion: active/assisted/passive  10. Therapeutic Activities  11. Group Therapy     TREATMENT PLAN: Frequency/Duration: Follow patient BID   to address above goals. Rehabilitation Potential For Stated Goals: Good     RECOMMENDED REHABILITATION/EQUIPMENT: (at time of discharge pending progress): Continue Skilled Therapy and Rehab. HISTORY:   History of Present Injury/Illness (Reason for Referral):  S/P R TKA  Past Medical History/Comorbidities:   Ms. 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 She also has no past medical history of Aneurysm ([address] Arrhythmia; Asthma; Autoimmune disease ([address] CAD (coronary artery disease); Cancer ([address] Chronic kidney disease; Chronic obstructive pulmonary disease ([address] Chronic pain; Coagulation disorder ([address] Diabetes ([address] Difficult intubation; Endocarditis; GERD (gastroesophageal reflux disease); Heart failure ([address] Liver disease; Malignant hyperthermia due to anesthesia; Morbid obesity ([address] Nausea & vomiting; Nicotine vapor product user; Non-nicotine vapor product user; Pseudocholinesterase deficiency; PUD (peptic ulcer disease); Rheumatic fever; Seizures ([address] Sleep apnea; Stroke [facility]); Thromboembolus ([address] or Thyroid disease. Ms. Mendoza Nurse  has a past surgical history that includes hx knee arthroscopy (Right) and hx colonoscopy.   Social History/Living Environment:   Home Environment: Private residence  # Steps to Enter: 0  One/Two Story Residence: One story  Living Alone: No  Support Systems: Child(britany), Family member(s), Spouse/Significant Other/Partner  Patient Expects to be Discharged to[de-identified] Rehabilitation facility (Wants to go to [facility])  Current DME Used/Available at Home: Cane, straight  Tub or Shower Type: Shower  Prior Level of Function/Work/Activity:  Functionally independent with ADls and amb   Number of Personal Factors/Comorbidities that affect the Plan of Care: 0: LOW COMPLEXITY   EXAMINATION:   Most Recent Physical Functioning:      Gross Assessment  AROM: Within functional limits (L LE)  Strength: Generally decreased, functional (L LE 4/5)  Sensation: Intact (L LE)        RLE AROM  R Knee Flexion: 70  R Knee Extension: 15       RLE Strength  R Hip Flexion: 2+  R Knee Extension: 2+  R Ankle Dorsiflexion: 2+    Bed Mobility  Supine to Sit: Minimum assistance  Sit to Supine: Minimum assistance    Transfers  Sit to Stand: Minimum assistance  Stand to Sit: Minimum assistance  Stand Pivot Transfers: Minimum assistance (with RW)    Balance  Sitting: Intact  Standing: Pull to stand; With support              Weight Bearing Status  Right Side Weight Bearing: As tolerated  Distance (ft): 15 Feet (ft)  Ambulation - Level of Assistance: Minimal assistance  Assistive Device: Walker, rolling  Speed/Joseline: Pace decreased (<100 feet/min)  Step Length: Left shortened  Stance: Right decreased  Gait Abnormalities: Antalgic; Step to gait  Interventions: Safety awareness training;Verbal cues; Tactile cues     Braces/Orthotics:     Right Knee Cold  Type: Cryocuff      Body Structures Involved:  1. Bones  2. Joints  3. Muscles Body Functions Affected:  1. Neuromusculoskeletal  2. Movement Related Activities and Participation Affected:  1. Mobility  2.  Self Care   Number of elements that affect the Plan of Care: 4+: HIGH COMPLEXITY   CLINICAL PRESENTATION:   Presentation: Stable and uncomplicated: LOW COMPLEXITY   CLINICAL DECISION MAKIN[address] [place] [id number] AM-PAC 6 Clicks   Basic Mobility Inpatient Short Form  How much difficulty does the patient currently have. .. Unable A Lot A Little None   1. Turning over in bed (including adjusting bedclothes, sheets and blankets)? [] 1   [] 2   [x] 3   [] 4   2. Sitting down on and standing up from a chair with arms ( e.g., wheelchair, bedside commode, etc.)   [] 1   [] 2   [x] 3   [] 4   3. Moving from lying on back to sitting on the side of the bed? [] 1   [] 2   [x] 3   [] 4   How much help from another person does the patient currently need. .. Total A Lot A Little None   4. Moving to and from a bed to a chair (including a wheelchair)? [] 1   [] 2   [x] 3   [] 4   5. Need to walk in hospital room? [] 1   [] 2   [x] 3   [] 4   6. Climbing 3-5 steps with a railing? [] 1   [x] 2   [] 3   [] 4   © 2007, Trustees of [address] [place] [address] [address] [address], under license to [employer]. All rights reserved        Score:  Initial:17 Most Recent: X (Date: -- )    Interpretation of Tool:  Represents activities that are increasingly more difficult (i.e. Bed mobility, Transfers, Gait). Score 24 23 22-20 19-15 14-10 9-7 6     Modifier CH CI CJ CK CL CM CN      ? Mobility - Walking and Moving Around:     - CURRENT STATUS: CK - 40%-59% impaired, limited or restricted    - GOAL STATUS: CJ - 20%-39% impaired, limited or restricted    - D/C STATUS:  ---------------To be determined---------------  Payor: Gilles Ashford / Plan: [address] [place] [address] HMO / Product Type: [employer] Medicare /      Medical Necessity:     · Patient demonstrates good rehab potential due to higher previous functional level. Reason for Services/Other Comments:  · Patient continues to require present interventions due to patient's inability to perform functional mobility independently.    Use of outcome tool(s) and clinical judgement create a POC that gives a: Clear prediction of patient's progress: LOW COMPLEXITY            TREATMENT:   (In addition to Assessment/Re-Assessment sessions the following treatments were rendered)     Pre-treatment Symptoms/Complaints:  Minimal discomfort in R knee  Pain: Initial:   Pain Intensity 1: 1  Pain Location 1: Knee  Pain Orientation 1: Right  Pain Intervention(s) 1: Ambulation/Increased Activity, Elevation, Cold pack, Repositioned  Post Session:  1, reapplied cryocuff and repositioned in bed with legs elevated     Assessment/Reassessment only, no treatment provided today    Date:   Date:   Date:     ACTIVITY/EXERCISE AM PM AM PM AM PM   GROUP THERAPY  []  []  []  []  []  []   Ankle Pumps         Quad Sets         Gluteal Sets         Hip ABd/ADduction         Straight Leg Raises         Knee Slides         Short Arc Quads         Long Arc Quads         Chair Slides                  B = bilateral; AA = active assistive; A = active; P = passive      Treatment/Session Assessment:     Response to Treatment:  Tolerated well. .    Education:  [] Home Exercises  [x] Fall Precautions  [] Hip Precautions [] D/C Instruction Review  [] Knee/Hip Prosthesis Review  [x] Walker Management/Safety [] Adaptive Equipment as Needed       Interdisciplinary Collaboration:   o Physical Therapist  o Occupational Therapist  o Registered Nurse    After treatment position/precautions:   o Supine in bed  o Bed/Chair-wheels locked  o Bed in low position  o RN notified  o Family at bedside  o Side rails x 3    Compliance with Program/Exercises: Will assess as treatment progresses. Recommendations/Intent for next treatment session:  Treatment next visit will focus on increasing Ms. White's independence with bed mobility, transfers, gait training, strength/ROM exercises, modalities for pain, and patient education.       Total Treatment Duration:  PT Patient Time In/Time Out  Time In: 1130  Time Out: 330 Ozzie Urena.

## 2018-01-24 NOTE — CONSULTS
Hospitalist Note     Admit Date:  2018  5:04 AM   Name:  Angelo Florentino   Age:  [de-identified] y.o.  :  1935   MRN:  [id number]   PCP:  Prem Villatoro MD  Treatment Team: Attending Provider: Lella Castleman., MD; Utilization Review: Megha Mon RN; Consulting Provider: Kianna Lewis MD; Consulting Provider: Tita Maza MD      Subjective:   Hospitalists consulted for assistance with management. Chart reviewed. Chronic issues stable.   No acute IM issues identified      Objective:   Patient Vitals for the past 24 hrs:   Temp Pulse Resp BP SpO2   18 1430 - - - - 97 %   18 1134 - - - - 97 %   18 1052 (!) 94.6 °F (34.8 °C) (!) 58 14 142/69 99 %   18 1009 - (!) 53 16 143/65 100 %   18 1000 - (!) 59 18 145/65 100 %   18 0945 - (!) 52 16 135/62 99 %   18 0930 - (!) 54 16 130/61 100 %   18 0925 - 60 16 116/55 100 %   18 0920 - (!) 54 18 114/55 100 %   18 0915 99.4 °F (37.4 °C) 63 16 104/51 99 %   18 0722 - (!) 54 16 150/61 100 %   18 0717 - (!) 56 16 151/70 100 %   18 0712 - (!) 52 16 143/60 100 %   18 0707 - (!) 55 16 148/68 100 %   18 0700 - (!) 54 18 153/72 97 %   18 0637 - (!) 50 16 156/72 98 %   18 0548 97.6 °F (36.4 °C) (!) 51 16 148/68 99 %     Oxygen Therapy  O2 Sat (%): 97 % (18 1430)  Pulse via Oximetry: 64 beats per minute (18 1430)  O2 Device: Room air (18 1430)  O2 Flow Rate (L/min): 0 l/min (18 1430)    Intake/Output Summary (Last 24 hours) at 18 1444  Last data filed at 18 0857   Gross per 24 hour   Intake             1800 ml   Output              550 ml   Net             1250 ml         Current Meds:  Current Facility-Administered Medications   Medication Dose Route Frequency    tuberculin injection 5 Units  5 Units IntraDERMal ONCE    [START ON 2018] amLODIPine (NORVASC) tablet 2.5 mg  2.5 mg Oral DAILY    citalopram (CELEXA) tablet 10 mg  10 mg Oral DAILY    metoprolol succinate (TOPROL-XL) XL tablet 25 mg  25 mg Oral QHS    temazepam (RESTORIL) capsule 15 mg  15 mg Oral QHS PRN    0.9% sodium chloride infusion  100 mL/hr IntraVENous CONTINUOUS    sodium chloride (NS) flush 5-10 mL  5-10 mL IntraVENous Q8H    sodium chloride (NS) flush 5-10 mL  5-10 mL IntraVENous PRN    ceFAZolin (ANCEF) 2 g/20 mL in sterile water IV syringe  2 g IntraVENous Q8H    acetaminophen (OFIRMEV) infusion 1,000 mg  1,000 mg IntraVENous ONCE    [START ON 1/25/2018] acetaminophen (TYLENOL) tablet 1,000 mg  1,000 mg Oral Q6H    HYDROmorphone (DILAUDID) tablet 2 mg  2 mg Oral Q4H PRN    HYDROmorphone (PF) (DILAUDID) injection 1 mg  1 mg IntraVENous Q3H PRN    naloxone (NARCAN) injection 0.2-0.4 mg  0.2-0.4 mg IntraVENous Q10MIN PRN    [START ON 1/25/2018] dexamethasone (DECADRON) injection 10 mg  10 mg IntraVENous ONCE    ondansetron (ZOFRAN) injection 4 mg  4 mg IntraVENous Q4H PRN    diphenhydrAMINE (BENADRYL) capsule 25 mg  25 mg Oral Q4H PRN    [START ON 1/25/2018] senna-docusate (PERICOLACE) 8.6-50 mg per tablet 2 Tab  2 Tab Oral DAILY    aspirin delayed-release tablet 325 mg  325 mg Oral Q12H    [START ON 1/25/2018] PPD read reminder  1 Each Other Q24H    [START ON 1/25/2018] losartan/hydroCHLOROthiazide (HYZAAR) 100/25 mg   Oral DAILY       Data Review:   Recent Results (from the past 24 hour(s))   TYPE & SCREEN    Collection Time: 01/24/18  6:14 AM   Result Value Ref Range    Crossmatch Expiration 01/27/2018     ABO/Rh(D) A NEGATIVE     Antibody screen NEG    GLUCOSE, POC    Collection Time: 01/24/18  6:21 AM   Result Value Ref Range    Glucose (POC) 96 65 - 100 mg/dL   URINALYSIS W/ RFLX MICROSCOPIC    Collection Time: 01/24/18  8:01 AM   Result Value Ref Range    Color YELLOW      Appearance CLEAR      Specific gravity 1.007 1.001 - 1.023      pH (UA) 7.0 5.0 - 9.0      Protein NEGATIVE  NEG mg/dL    Glucose NEGATIVE  NEG mg/dL    Ketone NEGATIVE  NEG mg/dL    Bilirubin NEGATIVE  NEG      Blood NEGATIVE  NEG      Urobilinogen 0.2 0.2 - 1.0 EU/dL    Nitrites NEGATIVE  NEG      Leukocyte Esterase NEGATIVE  NEG      Bacteria 0 0 /hpf       All Micro Results     None          Other Studies:  No results found. Assessment and Plan:     Hospital Problems as of 1/24/2018  Date Reviewed: 1/24/2018          Codes Class Noted - Resolved POA    * (Principal)Status post total right knee replacement ICD-10-CM: I74.986  ICD-9-CM: V43.65  1/24/2018 - Present No              PLAN:  · Agree with current treatment plan as per primary team.  A little hypothermic coming out of OR but doubt infection; monitor. · Nothing acute going on from our standpoint, but nursing may call us with any issues. · No charge billed to the patient for this. Thank you.     Signed:  Regla Toure MD

## 2018-01-24 NOTE — ANESTHESIA POSTPROCEDURE EVALUATION
Post-Anesthesia Evaluation and Assessment    Patient: Yao Babcock MRN: [id number]  SSN: [id number]    YOB: 1935  Age: [de-identified] y.o. Sex: female       Cardiovascular Function/Vital Signs  Visit Vitals    /65    Pulse (!) 59    Temp 37.4 °C (99.4 °F)    Resp 18    Ht 5' 4\" (1.626 m)    Wt 54.2 kg (119 lb 7.8 oz)    SpO2 100%    BMI 20.51 kg/m2       Patient is status post spinal anesthesia for Procedure(s):  RIGHT KNEE ARTHROPLASTY TOTAL / FNB / CEFERINO. Nausea/Vomiting: None    Postoperative hydration reviewed and adequate. Pain:  Pain Scale 1: Numeric (0 - 10) (01/24/18 0945)  Pain Intensity 1: 0 (01/24/18 0945)   Managed    Neurological Status:   Neuro (WDL): Exceptions to WDL (01/24/18 0945)  Neuro  Neurologic State: Drowsy (01/24/18 0945)  Orientation Level: Oriented to person;Oriented to place;Oriented to situation (01/24/18 0945)  Cognition: Follows commands (01/24/18 0945)  Speech: Clear (01/24/18 0945)  LUE Motor Response: Purposeful (01/24/18 0945)  LLE Motor Response: Pharmacologically paralyzed (01/24/18 0945)  RUE Motor Response: Purposeful (01/24/18 0945)  RLE Motor Response: Pharmacologically paralyzed (01/24/18 0945)   At baseline    Mental Status and Level of Consciousness: Arousable    Pulmonary Status:   O2 Device: Nasal cannula (01/24/18 1000)   Adequate oxygenation and airway patent    Complications related to anesthesia: None    Post-anesthesia assessment completed.  No concerns    Signed By: Enrique Morse MD     January 24, 2018

## 2018-01-24 NOTE — PHYSICIAN ADVISORY
Letter of Determination: Inpatient Status Appropriate    This patient was originally hospitalized as Inpatient Status on 1/24/2018 for right total knee arthroplasty. This patient is appropriate for Inpatient Admission based on medical necessity. The patient's stay was medically necessary based on advanced age, > 72 years age, hypertension with blood pressures to 156/72, bradycardia with pulse to 50 beats per minute, and post operative hypothermia to 94.6 degrees farenheit. It is our recommendation that this patient's hospitalization status should be INPATIENT status.      The final decision regarding the patient's hospitalization status depends on the attending physician's judgement.     Domo Chacon MD, BRIAN,   Physician Darrell Anderson.

## 2018-01-24 NOTE — PROGRESS NOTES
01/24/18 1134   Oxygen Therapy   O2 Sat (%) 97 %   Pulse via Oximetry 78 beats per minute   O2 Device Nasal cannula  (weaned to RA from 2L NC.)   O2 Flow Rate (L/min) 2 l/min   Patient achieved  1200   Ml/sec on IS. Patient encouraged to do 10 breaths every hour while awake-patient agreed and demonstrated. No shortness of breath or distress noted. BS are clear b/l. [facility] notes reviewed- monitor placed at bedside.

## 2018-01-24 NOTE — ANESTHESIA PROCEDURE NOTES
Spinal Block    Start time: 1/24/2018 7:41 AM  End time: 1/24/2018 7:44 AM  Performed by: Jaylon Evans by: Fouzia Ta:   Indications: primary anesthetic  Preanesthetic Checklist: patient identified, risks and benefits discussed, anesthesia consent, site marked, patient being monitored and timeout performed    Timeout Time: 07:41          Spinal Block:   Patient Position:  Seated  Prep Region:  Lumbar  Prep: chlorhexidine and patient draped      Location:  L2-3  Technique:  Single shot        Needle:   Needle Type:  Pencan  Needle Gauge:  25 G  Attempts:  1      Events: CSF confirmed, no blood with aspiration and no paresthesia        Assessment:  Insertion:  Uncomplicated  Patient tolerance:  Patient tolerated the procedure well with no immediate complications  9 mgs hyperbaric bupivacaine

## 2018-01-24 NOTE — OP NOTES
[facility]  Cementless Total Knee Arthroplasty: Posterior Cruciate Retaining     Patient:Jennifer White   : 1935  Medical Record [id number]  Pre-operative Diagnosis:  Primary osteoarthritis of one knee, right [M17.11]  Post-operative Diagnosis: Status post total right knee replacement  Location: [address] [place] [address]  Surgeon: Ger Griffin MD   Assistant: Daquan Sanchez PA-C    Anesthesia: Spinal and FNB    Procedure:Procedure(s) (LRB):  RIGHT KNEE ARTHROPLASTY TOTAL / FNB / Buster Gonzalez (Right)   The complexity of the total joint surgery requires the use of a first assistant for positioning, retraction and expertise in closure. Tourniquet Time: 0 minutes  EBL: 250 cc  Findings: severe degenerative arthritis, patellar osteophytes, posterior femoral osteophytes   BMI: Body mass index is 20.51 kg/(m^2). Olman Ngo was brought to the operating room and positioned on the operating table. She was anesthestized with anesthesia. A dunham catheter was placed preoperatively and IV antibiotics was administered. Prior to the incision being made a timeout was called identifying the patient, procedure ,operative side and surgeon The operative leg was prepped and draped in the usual sterile manner. An anterior longitudinal incision was accomplished just medial to the tibial tubercle and extending approximal 6 centimeters proximal to the superior pole of the patella. A medial parapatellar capsular incision was performed. The medial capsular flap was elevated around to the insertion of the semimembranous tendon. The patella was everted and the knee flexed and externally rotated. The medial and external menisci were excised. The lateral half of the fat pad excised and the patella femoral ligament was released. The anterior cruciate ligament was resect and the posterior cruciate ligament was retained.   Using extramedullary instrumentation, the tibial cut was accomplished with appropriate posterior slope. The distal femur was addressed. A drill hole was made above the intracondylar notch. Using appropriate intramedullary instrumentation,a five degree valgus distal cut was accomplished. The femur was sized. The anterior and posterior cuts were then made about the distal femur. The osteophytes were removed from the tibial and femoral surfaces. The flexion and extension gaps were assessed with the appropriate spacer blocks. Additional surgical procedures included: none. The flexion and extension gaps were deemed appropriately balanced. The appropriate cutting blocks were then utilized to perform the anterior, posterior and chamfer cuts, with appropriate lateral translation accomplished for the patellofemoral groove. Approximately 9 mm of bone was removed from the high side of the tibia. The tibia was sized. .  The tibial base plate was pinned into place with the appropriate external rotation and stem site prepared. A preliminary range of motion was accomplished. The  Patient was found to obtain full extension as well as appropriate flexion. The patient's ligaments were stable in flexion and extension to medial and lateral stressing and the alignment was through the appropriate mechanical axis. The patella was then everted. The bone was resect to accommodate the three peg patella button. A trial reduction revealed appropriate tracking through the patellofemoral groove with no lateral retinacular release being accomplished. All trial components were removed. The real implants were opened: Sizes listed below. The knee was irrigated. There were no femoral deficiencies. There were no tibial deficiencies. No augmentation was utilized. The permanent cementless Tibial and Femoral components were impacted into place. The cementless  patella component was then pressed in place.  The femoral component was found to rock on the anterior chamfer cut just slightly so the decision was made to cement in just the pegs of the femoral component. Abdifatah Kwong knee was placed through range of motion and noted to be stable as mentioned above with the trail components. The wound was dry, therefore no drain was used. The operative knee was injected with 60 cc of Naropin, 10 cc's of morphine and 1 cc of 30 mg of Toradol. The knee was then soaked with a diluted betadine solution for approximately 3 min. This was then thoroughly irrigated. The capsular layer was closed using a #1 PDS suture. Then, 1 gram (100 mg/ml) of Transexamic Acid was injected into the joint space. The subcutaneous layers were closed using 2-0 Stratafix. Finally the skin was closed using 3-0 Vicryl and skin staples, which were applied in occlusive fashion and sterile bandage applied. An Iceman cryo pad was applied on the operative leg. Sponge count and needle counts were correct. Abdifatah Kwong left the operating room     Implants:   Implant Name Type Inv.  Item Serial No.  Lot No. LRB No. Used   COMPNT FEM CR TRIATHLN 3 R PA --  - [id number]  COMPNT FEM CR TRIATHLN 3 R PA --  [id number] [employer] HOW [id number] Right 1   BASEPLT TIB PC TRITNM SZ 4 -- TRIATHLON - [id number]  BASEPLT TIB PC TRITNM SZ 4 -- TRIATHLON [id number] [employer] HOW [id number] Right 1   PAT ASYM MTL-BK 10MM SZ A35 -- TRIATHLON - [id number]  PAT ASYM MTL-BK 10MM SZ A35 -- TRIATHLON D8HT [employer] HOW [id number] Right 1   INSERT TIB CR TRIATHLN 4 9MM --  - [id number]  INSERT TIB CR TRIATHLN 4 9MM --  [id number] [employer] HOW [id number] Right 1   CEMENT BNE HV R 40GM -- PALACOS - [id number]   CEMENT BNE HV R 40GM -- PALACOS [id number] Liane Graff [id number] Right 1         Signed By: Susan Mariee MD   1/24/2018,  9:11 AM

## 2018-01-24 NOTE — ANESTHESIA PROCEDURE NOTES
Peripheral Block    Start time: 1/24/2018 7:04 AM  End time: 1/24/2018 7:06 AM  Performed by: oTya Thayer  Authorized by: Toya Thayer       Pre-procedure: Indications: at surgeon's request and post-op pain management    Preanesthetic Checklist: patient identified, risks and benefits discussed, site marked, timeout performed, anesthesia consent given and patient being monitored    Timeout Time: 07:04          Block Type:   Block Type:   Adductor canal  Laterality:  Right  Monitoring:  Standard ASA monitoring, continuous pulse ox, frequent vital sign checks, heart rate, responsive to questions and oxygen  Injection Technique:  Single shot  Procedures: ultrasound guided    Patient Position: supine  Prep: chlorhexidine    Location:  Mid thigh  Needle Type:  Stimuplex  Needle Gauge:  21 G  Needle Localization:  Anatomical landmarks and ultrasound guidance  Medication Injected:  0.5%  ropivacaine  Volume (mL):  20    Assessment:  Number of attempts:  1  Injection Assessment:  Incremental injection every 5 mL, local visualized surrounding nerve on ultrasound, negative aspiration for blood, no paresthesia, no intravascular symptoms and ultrasound image on chart  Patient tolerance:  Patient tolerated the procedure well with no immediate complications

## 2018-01-24 NOTE — ANESTHESIA PREPROCEDURE EVALUATION
Anesthetic History   No history of anesthetic complications            Review of Systems / Medical History  Patient summary reviewed and pertinent labs reviewed    Pulmonary  Within defined limits                 Neuro/Psych         Psychiatric history     Cardiovascular    Hypertension              Exercise tolerance: >4 METS     GI/Hepatic/Renal  Within defined limits              Endo/Other  Within defined limits           Other Findings              Physical Exam    Airway  Mallampati: II  TM Distance: > 6 cm  Neck ROM: normal range of motion   Mouth opening: Normal     Cardiovascular    Rhythm: regular           Dental  No notable dental hx       Pulmonary                 Abdominal  GI exam deferred       Other Findings            Anesthetic Plan    ASA: 2  Anesthesia type: spinal - femoral single shot      Post-op pain plan if not by surgeon: peripheral nerve block single    Induction: Intravenous  Anesthetic plan and risks discussed with: Patient

## 2018-01-24 NOTE — PROGRESS NOTES
Problem: Self Care Deficits Care Plan (Adult)  Goal: *Acute Goals and Plan of Care (Insert Text)  GOALS:   DISCHARGE GOALS (in preparation for going home/rehab):  3 days  1. Ms. Darlene Cosme will perform one lower body dressing activity with minimal assistance required to demonstrate improved functional mobility and safety. 2.  Ms. Darlene Cosme will perform one lower body bathing activity with minimal assistance required to demonstrate improved functional mobility and safety. 3.  Ms. Darlene Cosme will perform toileting/toilet transfer with contact guard assistance to demonstrate improved functional mobility and safety. 4.  Ms. Darlene Cosme will perform shower transfer with contact guard assistance to demonstrate improved functional mobility and safety. JOINT CAMP OCCUPATIONAL THERAPY TKA: Initial Assessment and AM 1/24/2018  INPATIENT: Hospital Day: 1  Payor: Phong Moody / Plan: [address] [place] [address] HMO / Product Type: [employer] /      NAME/AGE/GENDER: Perry Cohen is a [de-identified] y.o. female   PRIMARY DIAGNOSIS:  Primary osteoarthritis of one knee, right [M17.11]   Procedure(s) and Anesthesia Type:     * RIGHT KNEE ARTHROPLASTY TOTAL / FNB / CEFERINO - Spinal (Right)  ICD-10: Treatment Diagnosis:    · Pain in Right Knee (M25.561)  · Stiffness of Right Knee, Not elsewhere classified (M25.661)  · Other lack of cordination (R27.8)      ASSESSMENT:     Ms. Darlene Cosme is s/p right TKA and presents with decreased weight bearing on right LE and decreased independence with functional mobility and activities of daily living. Patient would benefit from skilled Occupational Therapy to maximize independence and safety with self-care task and functional mobility. Pt would also benefit from education on adaptive equipment and safety precautions in preparation for going home or for recommendations for post-hospital rehab program.  Patient plans for further rehab [facility] rehab facility.   OT reviewed therapy schedule and plan of care with patient. Patient was able to transfer and preform self care skills as charted below. Patient instructed to call for assistance when needing to get up from the bed and all needs in reach. Patient verbalized understanding of call light. This section established at most recent assessment   PROBLEM LIST (Impairments causing functional limitations):  1. Decreased Strength  2. Decreased ADL/Functional Activities  3. Decreased Transfer Abilities  4. Increased Pain  5. Increased Fatigue  6. Decreased Flexibility/Joint Mobility  7. Decreased Knowledge of Precautions   INTERVENTIONS PLANNED: (Benefits and precautions of occupational therapy have been discussed with the patient.)  1. Activities of daily living training  2. Adaptive equipment training  3. Balance training  4. Clothing management  5. Donning&doffing training  6. Theraputic activity     TREATMENT PLAN: Frequency/Duration: Follow patient 1 time to address above goals. Rehabilitation Potential For Stated Goals:      RECOMMENDED REHABILITATION/EQUIPMENT: (at time of discharge pending progress): Continue Skilled Therapy and Rehab. OCCUPATIONAL PROFILE AND HISTORY:   History of Present Injury/Illness (Reason for Referral): Pt presents this date s/p (right) TKA. Past Medical History/Comorbidities:   Ms. 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 She also has no past medical history of Aneurysm ([address] Arrhythmia; Asthma; Autoimmune disease ([address] CAD (coronary artery disease); Cancer ([address] Chronic kidney disease; Chronic obstructive pulmonary disease ([address] Chronic pain; Coagulation disorder ([address] Diabetes ([address] Difficult intubation; Endocarditis; GERD (gastroesophageal reflux disease); Heart failure ([address] Liver disease; Malignant hyperthermia due to anesthesia; Morbid obesity ([address]  Nausea & vomiting; Nicotine vapor product user; Non-nicotine vapor product user; Pseudocholinesterase deficiency; PUD (peptic ulcer disease); Rheumatic fever; Seizures ([facility] Sleep apnea; Stroke [facility]); Thromboembolus ([facility] or Thyroid disease. Ms. James De La Rosa  has a past surgical history that includes hx knee arthroscopy (Right) and hx colonoscopy. Social History/Living Environment:   Home Environment: Private residence  # Steps to Enter: 0  One/Two Story Residence: One story  Living Alone: No  Support Systems: Child(britany), Family member(s), Spouse/Significant Other/Partner  Patient Expects to be Discharged to[de-identified] Rehabilitation facility (Wants to go to [facility])  Current DME Used/Available at Home: Cane, straight  Tub or Shower Type: Shower  Prior Level of Function/Work/Activity:  Mod I with ADLS, cane at times     Number of Personal Factors/Comorbidities that affect the Plan of Care: Brief history (0):  LOW COMPLEXITY   ASSESSMENT OF OCCUPATIONAL PERFORMANCE[de-identified]   Most Recent Physical Functioning:   Balance  Sitting: Intact  Standing: Pull to stand; With support       Patient Vitals for the past 6 hrs:   BP BP Patient Position SpO2 O2 Flow Rate (L/min) Pulse   01/24/18 0637 156/72 At rest 98 % - (!) 50   01/24/18 0700 153/72 At rest 97 % 2 l/min (!) 54   01/24/18 0707 148/68 At rest 100 % 2 l/min (!) 55   01/24/18 0712 143/60 - 100 % 2 l/min (!) 52   01/24/18 0717 151/70 - 100 % 2 l/min (!) 56   01/24/18 0722 150/61 - 100 % 2 l/min (!) 54   01/24/18 0915 104/51 - 99 % - 63   01/24/18 0920 114/55 - 100 % 2 l/min (!) 54   01/24/18 0925 116/55 - 100 % 2 l/min 60   01/24/18 0930 130/61 - 100 % 2 l/min (!) 54   01/24/18 0945 135/62 - 99 % 2 l/min (!) 52   01/24/18 1000 145/65 - 100 % 2 l/min (!) 59   01/24/18 1009 143/65 - 100 % 2 l/min (!) 53   01/24/18 1052 142/69 At rest 99 % - (!) 58   01/24/18 1134 - - 97 % 2 l/min -       Gross Assessment  AROM: Within functional limits (L LE)  Strength: Generally decreased, functional (L LE 4/5)  Sensation: Intact (L LE) Mental Status  Neurologic State: Alert  Orientation Level: Oriented X4  Cognition: Appropriate decision making  Perception: Appears intact  Perseveration: No perseveration noted  Safety/Judgement: Awareness of environment          RLE AROM  R Knee Flexion: 70  R Knee Extension: 15  RLE Strength  R Hip Flexion: 2+  R Knee Extension: 2+  R Ankle Dorsiflexion: 2+  RLE Sensation  Light Touch: Partial deficit  Proprioception: Partial deficit     Basic ADLs (From Assessment) Complex ADLs (From Assessment)   Basic ADL  Feeding: Setup  Oral Facial Hygiene/Grooming: Supervision  Bathing: Moderate assistance  Upper Body Dressing: Supervision  Lower Body Dressing: Moderate assistance  Toileting: Total assistance (catheter)     Grooming/Bathing/Dressing Activities of Daily Living     Cognitive Retraining  Safety/Judgement: Awareness of environment                 Functional Transfers  Toilet Transfer : Minimum assistance  Shower Transfer: Minimum assistance     Bed/Mat Mobility  Supine to Sit: Minimum assistance  Sit to Supine: Minimum assistance  Sit to Stand: Minimum assistance         Physical Skills Involved:  1. Range of Motion  2. Balance  3. Strength Cognitive Skills Affected (resulting in the inability to perform in a timely and safe manner): 1. none Psychosocial Skills Affected:  1. none   Number of elements that affect the Plan of Care: 1-3:  LOW COMPLEXITY   CLINICAL DECISION MAKIN [facility] [address] [id number] AM-PAC 6 Clicks   Daily Activity Inpatient Short Form  How much help from another person does the patient currently need. .. Total A Lot A Little None   1. Putting on and taking off regular lower body clothing? [] 1   [x] 2   [] 3   [] 4   2. Bathing (including washing, rinsing, drying)? [] 1   [x] 2   [] 3   [] 4   3. Toileting, which includes using toilet, bedpan or urinal?   [x] 1   [] 2   [] 3   [] 4   4. Putting on and taking off regular upper body clothing?    [] 1   [] 2   [x] 3   [] 4   5.  Taking care of personal grooming such as brushing teeth? [] 1   [] 2   [x] 3   [] 4   6. Eating meals? [] 1   [] 2   [] 3   [x] 4   © 2007, Trustees of [address] [place] [address] [address] [address], under license to [employer]. All rights reserved     Score:  Initial: 15 Most Recent: X (Date: -- )    Interpretation of Tool:  Represents activities that are increasingly more difficult (i.e. Bed mobility, Transfers, Gait). Score 24 23 22-20 19-15 14-10 9-7 6     Modifier CH CI CJ CK CL CM CN      ? Self Care:     - CURRENT STATUS: CK - 40%-59% impaired, limited or restricted    - GOAL STATUS: CJ - 20%-39% impaired, limited or restricted    - D/C STATUS:  ---------------To be determined---------------  Payor: John Everett / Plan: [address] [place] [address] HMO / Product Type: [employer] Medicare /      Medical Necessity:     · Patient is expected to demonstrate progress in balance, coordination and functional technique to decrease assistance required with self care and functional mobility and improve safety during self care and functional mobility. Reason for Services/Other Comments:  · Patient continues to require skilled intervention due to decreased self care and functional mobility. Use of outcome tool(s) and clinical judgement create a POC that gives a: LOW COMPLEXITY            TREATMENT:   (In addition to Assessment/Re-Assessment sessions the following treatments were rendered)     Pre-treatment Symptoms/Complaints:    Pain: Initial:   Pain Intensity 1: 1  Pain Location 1: Knee  Pain Orientation 1: Right  Pain Intervention(s) 1: Ambulation/Increased Activity  Post Session:  1/10 rest, iceman in place     Assessment/Reassessment only, no treatment provided today    Treatment/Session Assessment:     Response to Treatment:  Tolerated well, plans to go to rehab .     Education:  [] Home Exercises  [x] Fall Precautions  [] Hip Precautions [] Going Home Video  [x] Knee/Hip Prosthesis Review  [x] Gloria Rodriguez Management/Safety [x] Adaptive Equipment as Needed       Interdisciplinary Collaboration:   o Physical Therapist  o Occupational Therapist  o Registered Nurse    After treatment position/precautions:   o Supine in bed  o Bed alarm/tab alert on  o Bed/Chair-wheels locked  o Bed in low position  o Call light within reach  o RN notified  o Family at bedside  o Side rails x 3     Compliance with Program/Exercises: compliant all of the time. Recommendations/Intent for next treatment session:  Treatment next visit will focus on increasing Ms. White's independence with bed mobility, transfers, self care, functional mobility, modalities for pain, and patient education.       Total Treatment Duration:  OT Patient Time In/Time Out  Time In: 1140  Time Out: [address]

## 2018-01-25 LAB
ANION GAP SERPL CALC-SCNC: 3 MMOL/L (ref 7–16)
BUN SERPL-MCNC: 21 MG/DL (ref 8–23)
CALCIUM SERPL-MCNC: 8.3 MG/DL (ref 8.3–10.4)
CHLORIDE SERPL-SCNC: 104 MMOL/L (ref 98–107)
CO2 SERPL-SCNC: 34 MMOL/L (ref 21–32)
CREAT SERPL-MCNC: 0.81 MG/DL (ref 0.6–1)
GLUCOSE SERPL-MCNC: 100 MG/DL (ref 65–100)
HGB BLD-MCNC: 9.6 G/DL (ref 11.7–15.4)
MM INDURATION POC: 0 MM (ref 0–5)
POTASSIUM SERPL-SCNC: 4.2 MMOL/L (ref 3.5–5.1)
PPD POC: NORMAL NEGATIVE
SODIUM SERPL-SCNC: 141 MMOL/L (ref 136–145)

## 2018-01-25 PROCEDURE — 97535 SELF CARE MNGMENT TRAINING: CPT

## 2018-01-25 PROCEDURE — 74011250636 HC RX REV CODE- 250/636: Performed by: PHYSICIAN ASSISTANT

## 2018-01-25 PROCEDURE — 97110 THERAPEUTIC EXERCISES: CPT

## 2018-01-25 PROCEDURE — 85018 HEMOGLOBIN: CPT | Performed by: PHYSICIAN ASSISTANT

## 2018-01-25 PROCEDURE — 65270000029 HC RM PRIVATE

## 2018-01-25 PROCEDURE — 94760 N-INVAS EAR/PLS OXIMETRY 1: CPT

## 2018-01-25 PROCEDURE — 74011250637 HC RX REV CODE- 250/637: Performed by: ORTHOPAEDIC SURGERY

## 2018-01-25 PROCEDURE — 36415 COLL VENOUS BLD VENIPUNCTURE: CPT | Performed by: PHYSICIAN ASSISTANT

## 2018-01-25 PROCEDURE — 99221 1ST HOSP IP/OBS SF/LOW 40: CPT | Performed by: PHYSICAL MEDICINE & REHABILITATION

## 2018-01-25 PROCEDURE — 80048 BASIC METABOLIC PNL TOTAL CA: CPT | Performed by: PHYSICIAN ASSISTANT

## 2018-01-25 PROCEDURE — 97150 GROUP THERAPEUTIC PROCEDURES: CPT

## 2018-01-25 PROCEDURE — 94762 N-INVAS EAR/PLS OXIMTRY CONT: CPT

## 2018-01-25 PROCEDURE — 74011250637 HC RX REV CODE- 250/637: Performed by: PHYSICIAN ASSISTANT

## 2018-01-25 PROCEDURE — 97116 GAIT TRAINING THERAPY: CPT

## 2018-01-25 RX ADMIN — ASPIRIN 325 MG: 325 TABLET, DELAYED RELEASE ORAL at 09:21

## 2018-01-25 RX ADMIN — HYDROMORPHONE HYDROCHLORIDE 2 MG: 2 TABLET ORAL at 04:52

## 2018-01-25 RX ADMIN — ASPIRIN 325 MG: 325 TABLET, DELAYED RELEASE ORAL at 20:55

## 2018-01-25 RX ADMIN — ACETAMINOPHEN 1000 MG: 500 TABLET, FILM COATED ORAL at 01:51

## 2018-01-25 RX ADMIN — Medication 2 G: at 00:00

## 2018-01-25 RX ADMIN — HYDROMORPHONE HYDROCHLORIDE 2 MG: 2 TABLET ORAL at 18:10

## 2018-01-25 RX ADMIN — TEMAZEPAM 15 MG: 15 CAPSULE ORAL at 20:55

## 2018-01-25 RX ADMIN — METOPROLOL SUCCINATE 25 MG: 25 TABLET, EXTENDED RELEASE ORAL at 20:55

## 2018-01-25 RX ADMIN — ACETAMINOPHEN 1000 MG: 500 TABLET, FILM COATED ORAL at 11:20

## 2018-01-25 RX ADMIN — HYDROMORPHONE HYDROCHLORIDE 2 MG: 2 TABLET ORAL at 09:21

## 2018-01-25 RX ADMIN — SENNOSIDES AND DOCUSATE SODIUM 2 TABLET: 8.6; 5 TABLET ORAL at 09:21

## 2018-01-25 RX ADMIN — DIPHENHYDRAMINE HYDROCHLORIDE 25 MG: 25 CAPSULE ORAL at 20:55

## 2018-01-25 RX ADMIN — HYDROMORPHONE HYDROCHLORIDE 2 MG: 2 TABLET ORAL at 13:08

## 2018-01-25 RX ADMIN — CITALOPRAM HYDROBROMIDE 10 MG: 20 TABLET ORAL at 09:21

## 2018-01-25 RX ADMIN — ACETAMINOPHEN 1000 MG: 500 TABLET, FILM COATED ORAL at 05:58

## 2018-01-25 RX ADMIN — ACETAMINOPHEN 1000 MG: 500 TABLET, FILM COATED ORAL at 18:10

## 2018-01-25 NOTE — PROGRESS NOTES
01/24/18 2220   Oxygen Therapy   O2 Sat (%) (!) 87 %  (placed on NC 2lpm)   Pulse via Oximetry 78 beats per minute   O2 Device Room air    Continuous sat monitor ordered QHS. Pt set up on Monitor #6. Previous data deleted. Placed pt on O2 2lpm for O2 sat 87%. O2 sat increased to 95% with O2. Alarms set per protocol.

## 2018-01-25 NOTE — PROGRESS NOTES
Care Management Interventions  Mode of Transport at Discharge: Self  Transition of Care Consult (CM Consult): SNF  Partner SNF: Yes  Physical Therapy Consult: Yes  Occupational Therapy Consult: Yes  Current Support Network: Lives with Spouse  Confirm Follow Up Transport: Family  Plan discussed with Pt/Family/Caregiver: Yes  Freedom of Choice Offered: Yes  Discharge Location  Discharge Placement: Skilled nursing facility    Patient is an [de-identified] yr old female admitted for a right TKA. She lives with her spouse in [facility] [place]. Patient reports she has made arrangements to go to [facility] for rehab on d/c. Referral sent and approval obtained pending [employer] precert. We hope to have by Friday 1-26. Will follow.  Brianna Lawrence

## 2018-01-25 NOTE — PROGRESS NOTES
Problem: Mobility Impaired (Adult and Pediatric)  Goal: *Acute Goals and Plan of Care (Insert Text)  GOALS (1-4 days):  (1.)Ms. Blanca Barron will move from supine to sit and sit to supine  in bed with SUPERVISION. (2.)Ms. Blanca Barron will transfer from bed to chair and chair to bed with SUPERVISION using the least restrictive device. (3.)Ms. Blanca Barron will ambulate with SUPERVISION for 100 feet with the least restrictive device. (4.)Ms. Blanca Barron will ambulate up/down 4 steps with bilateral  railing with CONTACT GUARD ASSIST with no device. (5.)Ms. Blanca Barron will increase right knee ROM to 5°-80°.  ________________________________________________________________________________________________    PHYSICAL THERAPY [facility] camp tKa: Daily Note, Treatment Day: 1st, AM 1/25/2018  INPATIENT: Hospital Day: 2  Payor: Javid Espinoza / Plan: [address] [place] [address] HMO / Product Type: Managed Care Medicare /      NAME/AGE/GENDER: Abdifatah Kwong is a [de-identified] y.o. female   PRIMARY DIAGNOSIS:  Primary osteoarthritis of one knee, right [M17.11]   Procedure(s) and Anesthesia Type:     * RIGHT KNEE ARTHROPLASTY TOTAL / FNB / Forrest Bussing - Spinal (Right)  ICD-10: Treatment Diagnosis:    · Pain in Right Knee (M25.561)  · Stiffness of Right Knee, Not elsewhere classified (M25.661)  · Difficulty in walking, Not elsewhere classified (R26.2)  · Other abnormalities of gait and mobility (R26.89)      ASSESSMENT:     Ms. Blanca Barron presents supine on contact and agreeable to therapy. Worked on bed mobility, sit to stand and then worked on gait training in the mena with verbal cues for gait sequencing and walker use. In room worked on TKA exercises with verbal cues. Pt left up in chair with needs in reach and ice in place. Hope to further progress this afternoon. This section established at most recent assessment   PROBLEM LIST (Impairments causing functional limitations):  1. Decreased Strength  2. Decreased Transfer Abilities  3.  Decreased Ambulation Ability/Technique  4. Decreased Balance  5. Increased Pain  6. Decreased Activity Tolerance  7. Increased Fatigue  8. Decreased Flexibility/Joint Mobility  9. Decreased Knowledge of Precautions  10. Decreased [place] with Home Exercise Program   INTERVENTIONS PLANNED: (Benefits and precautions of physical therapy have been discussed with the patient.)  1. Balance Exercise  2. Bed Mobility  3. Cold  4. Gait Training  5. Home Exercise Program (HEP)  6. Therapeutic Exercise/Strengthening  7. Transfer Training  8. TKA education  9. Range of Motion: active/assisted/passive  10. Therapeutic Activities  11. Group Therapy     TREATMENT PLAN: Frequency/Duration: Follow patient BID   to address above goals. Rehabilitation Potential For Stated Goals: Good     RECOMMENDED REHABILITATION/EQUIPMENT: (at time of discharge pending progress): Continue Skilled Therapy and Rehab. HISTORY:   History of Present Injury/Illness (Reason for Referral):  S/P R TKA  Past Medical History/Comorbidities:   Ms. 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 She also has no past medical history of Aneurysm ([address] Arrhythmia; Asthma; Autoimmune disease ([address] CAD (coronary artery disease); Cancer ([address] Chronic kidney disease; Chronic obstructive pulmonary disease ([address] Chronic pain; Coagulation disorder ([address] Diabetes ([address] Difficult intubation; Endocarditis; GERD (gastroesophageal reflux disease); Heart failure ([address] Liver disease; Malignant hyperthermia due to anesthesia; Morbid obesity ([address] Nausea & vomiting; Nicotine vapor product user; Non-nicotine vapor product user; Pseudocholinesterase deficiency; PUD (peptic ulcer disease); Rheumatic fever; Seizures ([address] Sleep apnea; Stroke [facility]); Thromboembolus ([address] or Thyroid disease.   Ms. Toribio Guevara  has a past surgical history that includes hx knee arthroscopy (Right) and hx colonoscopy. Social History/Living Environment:   Home Environment: Private residence  # Steps to Enter: 0  One/Two Story Residence: One story  Living Alone: No  Support Systems: Child(britany), Family member(s), Spouse/Significant Other/Partner  Patient Expects to be Discharged to[de-identified] Rehabilitation facility (Wants to go to [facility])  Current DME Used/Available at Home: Cane, straight  Tub or Shower Type: Shower  Prior Level of Function/Work/Activity:  Functionally independent with ADls and amb   Number of Personal Factors/Comorbidities that affect the Plan of Care: 0: LOW COMPLEXITY   EXAMINATION:   Most Recent Physical Functioning:                            Bed Mobility  Supine to Sit: Contact guard assistance;Minimum assistance  Sit to Supine: Contact guard assistance;Minimum assistance    Transfers  Sit to Stand: Minimum assistance  Stand to Sit: Minimum assistance    Balance  Sitting: Intact  Standing: Pull to stand; With support         Gait Training: Yes    Weight Bearing Status  Right Side Weight Bearing: As tolerated  Distance (ft): 50 Feet (ft)  Ambulation - Level of Assistance: Minimal assistance  Assistive Device: Walker, rolling  Speed/Joseline: Pace decreased (<100 feet/min)  Step Length: Left shortened  Stance: Right decreased  Gait Abnormalities: Antalgic;Decreased step clearance; Step to gait  Interventions: Safety awareness training;Verbal cues     Braces/Orthotics:     Right Knee Cold  Type: Cryocuff      Body Structures Involved:  1. Bones  2. Joints  3. Muscles Body Functions Affected:  1. Neuromusculoskeletal  2. Movement Related Activities and Participation Affected:  1. Mobility  2. Self Care   Number of elements that affect the Plan of Care: 4+: HIGH COMPLEXITY   CLINICAL PRESENTATION:   Presentation: Stable and uncomplicated: LOW COMPLEXITY   CLINICAL DECISION MAKIN [facility] [address] [address] AM-PAC 6 Clicks   Basic Mobility Inpatient Short Form  How much difficulty does the patient currently have. .. Unable A Lot A Little None   1. Turning over in bed (including adjusting bedclothes, sheets and blankets)? [] 1   [] 2   [x] 3   [] 4   2. Sitting down on and standing up from a chair with arms ( e.g., wheelchair, bedside commode, etc.)   [] 1   [] 2   [x] 3   [] 4   3. Moving from lying on back to sitting on the side of the bed? [] 1   [] 2   [x] 3   [] 4   How much help from another person does the patient currently need. .. Total A Lot A Little None   4. Moving to and from a bed to a chair (including a wheelchair)? [] 1   [] 2   [x] 3   [] 4   5. Need to walk in hospital room? [] 1   [] 2   [x] 3   [] 4   6. Climbing 3-5 steps with a railing? [] 1   [x] 2   [] 3   [] 4   © 2007, Trustees of [address] [place] [id number], under license to [employer]. All rights reserved        Score:  Initial:17 Most Recent: X (Date: -- )    Interpretation of Tool:  Represents activities that are increasingly more difficult (i.e. Bed mobility, Transfers, Gait). Score 24 23 22-20 19-15 14-10 9-7 6     Modifier CH CI CJ CK CL CM CN      ? Mobility - Walking and Moving Around:     - CURRENT STATUS: CK - 40%-59% impaired, limited or restricted    - GOAL STATUS: CJ - 20%-39% impaired, limited or restricted    - D/C STATUS:  ---------------To be determined---------------  Payor: Khushboo Cannon / Plan: [address] [place] [address] HMO / Product Type: Managed Care Medicare /      Medical Necessity:     · Patient demonstrates good rehab potential due to higher previous functional level. Reason for Services/Other Comments:  · Patient continues to require present interventions due to patient's inability to perform functional mobility independently.    Use of outcome tool(s) and clinical judgement create a POC that gives a: Clear prediction of patient's progress: LOW COMPLEXITY            TREATMENT:   (In addition to Assessment/Re-Assessment sessions the following treatments were rendered)     Pre-treatment Symptoms/Complaints:  none  Pain Initial: no reports of pain     Post Session:  No complaints     Gait Training (15 Minutes):  Gait training to improve and/or restore physical functioning as related to mobility and strength. Ambulated 50 Feet (ft) with Minimal assistance using a Walker, rolling and minimal Safety awareness training;Verbal cues related to their stance phase and stride length to promote proper body alignment and promote proper body posture. Instruction in performance of walker use and gait sequencing to correct stance phase and stride length. Therapeutic Exercise: (15 Minutes):  Exercises per grid below to improve mobility and strength. Required minimal verbal and manual cues to promote proper body alignment and promote proper body posture. Progressed repetitions as indicated. Date:  1/25/18 Date:   Date:     ACTIVITY/EXERCISE AM PM AM PM AM PM   GROUP THERAPY  []  []  []  []  []  []   Ankle Pumps 10        Quad Sets 10        Gluteal Sets 10        Hip ABd/ADduction 10        Straight Leg Raises 10        Knee Slides 10        Short Arc Quads         Long Arc Quads         Chair Slides                  B = bilateral; AA = active assistive; A = active; P = passive      Treatment/Session Assessment:     Response to Treatment:  Tolerated well. .    Education:  [x] Home Exercises  [x] Fall Precautions   [] D/C Instruction Review  [] Knee Prosthesis Review  [x] Walker Management/Safety [] Adaptive Equipment as Needed       Interdisciplinary Collaboration:   o Registered Nurse  o Rehabilitation Attendant    After treatment position/precautions:   o Up in chair  o Bed/Chair-wheels locked  o Call light within reach    Compliance with Program/Exercises: compliant all the time. Recommendations/Intent for next treatment session:  Treatment next visit will focus on increasing Ms. White's independence with bed mobility, transfers, gait training, strength/ROM exercises, modalities for pain, and patient education.       Total Treatment Duration:  PT Patient Time In/Time Out  Time In: 1963  Time Out: [address], [place]

## 2018-01-25 NOTE — PROGRESS NOTES
Problem: Self Care Deficits Care Plan (Adult)  Goal: *Acute Goals and Plan of Care (Insert Text)  GOALS:   DISCHARGE GOALS (in preparation for going home/rehab):  3 days  1. Ms. James De La Rosa will perform one lower body dressing activity with minimal assistance required to demonstrate improved functional mobility and safety. GOAL MET 1/25/2018     2. Ms. James De La Rosa will perform one lower body bathing activity with minimal assistance required to demonstrate improved functional mobility and safety. GOAL MET 1/25/2018     3. Ms. James De La Rosa will perform toileting/toilet transfer with contact guard assistance to demonstrate improved functional mobility and safety. GOAL MET 1/25/2018     4. Ms. James De La Rosa will perform shower transfer with contact guard assistance to demonstrate improved functional mobility and safety. GOAL MET 1/25/2018         JOINT CAMP OCCUPATIONAL THERAPY TKA: Daily Note and AM 1/25/2018  INPATIENT: Hospital Day: 2  Payor: Cesar Giles / Plan: [address] [place] [address] HMO / Product Type: [employer] Medicare /      NAME/AGE/GENDER: Yao Babcock is a [de-identified] y.o. female   PRIMARY DIAGNOSIS:  Primary osteoarthritis of one knee, right [M17.11]   Procedure(s) and Anesthesia Type:     * RIGHT KNEE ARTHROPLASTY TOTAL / FNB / CEFERINO - Spinal (Right)  ICD-10: Treatment Diagnosis:    · Pain in Right Knee (M25.561)  · Stiffness of Right Knee, Not elsewhere classified (M25.661)  · Other lack of cordination (R27.8)      ASSESSMENT:      Ms. James De La Rosa is s/p right TKA and presents with decreased weight bearing on right LE and decreased independence with functional mobility and activities of daily living. Patient completed shower and dressing as charter below in ADL grid and is ambulating with rolling walker and contact guard assist.  Patient has met 4/4 goals and plans to return home with good family support. Family able to provide patient with appropriate level of assistance at this time.   OT reviewed safety precautions throughout session and therapy schedule for the remainder of today. Patient instructed to call for assistance when needing to get up from recliner and all needs in reach. Patient verbalized understanding of call light. This section established at most recent assessment   PROBLEM LIST (Impairments causing functional limitations):  1. Decreased Strength  2. Decreased ADL/Functional Activities  3. Decreased Transfer Abilities  4. Increased Pain  5. Increased Fatigue  6. Decreased Flexibility/Joint Mobility  7. Decreased Knowledge of Precautions   INTERVENTIONS PLANNED: (Benefits and precautions of occupational therapy have been discussed with the patient.)  1. Activities of daily living training  2. Adaptive equipment training  3. Balance training  4. Clothing management  5. Donning&doffing training  6. Theraputic activity     TREATMENT PLAN: Frequency/Duration: Follow patient 1 time to address above goals. Rehabilitation Potential For Stated Goals:      RECOMMENDED REHABILITATION/EQUIPMENT: (at time of discharge pending progress): Continue Skilled Therapy and Rehab. OCCUPATIONAL PROFILE AND HISTORY:   History of Present Injury/Illness (Reason for Referral): Pt presents this date s/p (right) TKA. Past Medical History/Comorbidities:   Ms. 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 She also has no past medical history of Aneurysm ([address] Arrhythmia; Asthma; Autoimmune disease ([address] CAD (coronary artery disease); Cancer ([address] Chronic kidney disease; Chronic obstructive pulmonary disease ([address] Chronic pain; Coagulation disorder ([address] Diabetes ([address] Difficult intubation; Endocarditis; GERD (gastroesophageal reflux disease); Heart failure ([address] Liver disease; Malignant hyperthermia due to anesthesia; Morbid obesity ([address]  Nausea & vomiting; Nicotine vapor product user; Non-nicotine vapor product user; Pseudocholinesterase deficiency; PUD (peptic ulcer disease); Rheumatic fever; Seizures ([facility] Sleep apnea; Stroke [facility]); Thromboembolus ([facility] or Thyroid disease. Ms. Ruth Corrales  has a past surgical history that includes hx knee arthroscopy (Right) and hx colonoscopy. Social History/Living Environment:   Home Environment: Private residence  # Steps to Enter: 0  One/Two Story Residence: One story  Living Alone: No  Support Systems: Child(britany), Family member(s), Spouse/Significant Other/Partner  Patient Expects to be Discharged to[de-identified] Rehabilitation facility (Wants to go to [facility])  Current DME Used/Available at Home: Cane, straight  Tub or Shower Type: Shower  Prior Level of Function/Work/Activity:  Mod I with ADLS, cane at times     Number of Personal Factors/Comorbidities that affect the Plan of Care: Brief history (0):  LOW COMPLEXITY   ASSESSMENT OF OCCUPATIONAL PERFORMANCE[de-identified]   Most Recent Physical Functioning:   Balance  Sitting: Intact  Standing: Pull to stand; With support       Patient Vitals for the past 6 hrs:   BP BP Patient Position SpO2 O2 Flow Rate (L/min) Pulse   01/25/18 0737 98/53 Sitting; At rest 95 % - 63   01/25/18 0804 - - 97 % 2 l/min -   01/25/18 1102 113/57 Sitting 94 % - 91                              Mental Status  Neurologic State: Alert  Orientation Level: Oriented X4  Cognition: Appropriate decision making; Appropriate for age attention/concentration; Appropriate safety awareness; Follows commands  Perception: Appears intact  Perseveration: No perseveration noted  Safety/Judgement: Awareness of environment; Fall prevention                Basic ADLs (From Assessment) Complex ADLs (From Assessment)   Basic ADL  Feeding: Setup  Oral Facial Hygiene/Grooming: Supervision  Bathing: Moderate assistance  Upper Body Dressing: Supervision  Lower Body Dressing: Moderate assistance  Toileting:  Total assistance (catheter)     Grooming/Bathing/Dressing Activities of Daily Living   Grooming  Grooming Assistance: Supervision/set up  Washing Face: Supervision/set-up  Washing Hands: Supervision/set-up Cognitive Retraining  Safety/Judgement: Awareness of environment; Fall prevention   Upper Body Bathing  Bathing Assistance: Supervision/set-up  Position Performed: Seated in chair     Lower Body Bathing  Bathing Assistance: Minimum assistance  Perineal  : Supervision/set-up  Position Performed: Seated in chair  Lower Body : Minimum assistance  Position Performed: Seated in chair;Standing  Adaptive Equipment: Grab bar Toileting  Toileting Assistance: Supervision/set up  Bladder Hygiene: Supervision/set-up   Upper Body Dressing Assistance  Dressing Assistance: Supervision/set-up  Bra: Supervision/set-up  Pullover Shirt: Supervision/set-up Functional Transfers  Bathroom Mobility: Contact guard assistance  Toilet Transfer : Contact guard assistance  Shower Transfer: Contact guard assistance  Adaptive Equipment: Walker (comment)   Lower Body Dressing Assistance  Dressing Assistance: Minimum assistance  Underpants: Minimum assistance  Pants With Elastic Waist: Minimum assistance  Socks: Minimum assistance  Position Performed: Seated in chair;Standing  Adaptive Equipment Used: Walker Bed/Mat Mobility  Supine to Sit: Contact guard assistance;Minimum assistance  Sit to Supine: Contact guard assistance;Minimum assistance  Sit to Stand: Minimum assistance         Physical Skills Involved:  1. Range of Motion  2. Balance  3. Strength Cognitive Skills Affected (resulting in the inability to perform in a timely and safe manner): 1. none Psychosocial Skills Affected:  1. none   Number of elements that affect the Plan of Care: 1-3:  LOW COMPLEXITY   CLINICAL DECISION MAKIN [facility] [address] [address] AM-PAC 6 Clicks   Daily Activity Inpatient Short Form  How much help from another person does the patient currently need. .. Total A Lot A Little None   1. Putting on and taking off regular lower body clothing?    [] 1   [x] 2   [] 3   [] 4 2.  Bathing (including washing, rinsing, drying)? [] 1   [x] 2   [] 3   [] 4   3. Toileting, which includes using toilet, bedpan or urinal?   [x] 1   [] 2   [] 3   [] 4   4. Putting on and taking off regular upper body clothing? [] 1   [] 2   [x] 3   [] 4   5. Taking care of personal grooming such as brushing teeth? [] 1   [] 2   [x] 3   [] 4   6. Eating meals? [] 1   [] 2   [] 3   [x] 4   © 2007, Trustees of [address] [place] [address] [address] [address], under license to [employer]. All rights reserved     Score:  Initial: 15 Most Recent: X (Date: -- )    Interpretation of Tool:  Represents activities that are increasingly more difficult (i.e. Bed mobility, Transfers, Gait). Score 24 23 22-20 19-15 14-10 9-7 6     Modifier CH CI CJ CK CL CM CN      ? Self Care:     - CURRENT STATUS: CK - 40%-59% impaired, limited or restricted    - GOAL STATUS: CJ - 20%-39% impaired, limited or restricted    - D/C STATUS:  ---------------To be determined---------------  Payor: Jesus Cho / Plan: [address] [place] [address] HMO / Product Type: Managed Care Medicare /      Medical Necessity:     · Patient is expected to demonstrate progress in balance, coordination and functional technique to decrease assistance required with self care and functional mobility and improve safety during self care and functional mobility. Reason for Services/Other Comments:  · Patient continues to require skilled intervention due to decreased self care and functional mobility.    Use of outcome tool(s) and clinical judgement create a POC that gives a: LOW COMPLEXITY            TREATMENT:   (In addition to Assessment/Re-Assessment sessions the following treatments were rendered)     Pre-treatment Symptoms/Complaints:    Pain: Initial:   Pain Intensity 1: 0  Post Session:  1/10 rest, iceman in place     Self Care: (30): Procedure(s) (per grid) utilized to improve and/or restore self-care/home management as related to dressing, bathing, toileting and grooming. Required minimal verbal, tactile and   cueing to facilitate activities of daily living skills. Treatment/Session Assessment:     Response to Treatment:  Tolerated well, plans to go to rehab . Education:  [] Home Exercises  [x] Fall Precautions  [] Hip Precautions [] Going Home Video  [x] Knee/Hip Prosthesis Review  [x] Walker Management/Safety [x] Adaptive Equipment as Needed       Interdisciplinary Collaboration:   o Physical Therapist  o Occupational Therapist  o Registered Nurse    After treatment position/precautions:   o Up in chair  o Bed alarm/tab alert on  o Bed/Chair-wheels locked  o Bed in low position  o Call light within reach  o RN notified     Compliance with Program/Exercises: compliant all of the time. Recommendations/Intent for next treatment session:  Treatment next visit will focus on increasing Ms. White's independence with bed mobility, transfers, self care, functional mobility, modalities for pain, and patient education.       Total Treatment Duration:  OT Patient Time In/Time Out  Time In: 1000  Time Out: [address], [place]

## 2018-01-25 NOTE — PROGRESS NOTES
Resting comfortably without c/o,denies needs at present. No change in NV status noted. Call light within reach.

## 2018-01-25 NOTE — PROGRESS NOTES
Problem: Mobility Impaired (Adult and Pediatric)  Goal: *Acute Goals and Plan of Care (Insert Text)  GOALS (1-4 days):  (1.)Ms. Emigdio Barbosa will move from supine to sit and sit to supine  in bed with SUPERVISION. (2.)Ms. Emigdio Barbosa will transfer from bed to chair and chair to bed with SUPERVISION using the least restrictive device. (3.)Ms. Emigdio Barbosa will ambulate with SUPERVISION for 100 feet with the least restrictive device. (4.)Ms. Emigdio Barbosa will ambulate up/down 4 steps with bilateral  railing with CONTACT GUARD ASSIST with no device. (5.)Ms. Emigdio Barbosa will increase right knee ROM to 5°-80°.  ________________________________________________________________________________________________    PHYSICAL THERAPY [facility]: Daily Note, Treatment Day: 1st, PM 1/25/2018  INPATIENT: Hospital Day: 2  Payor: John Everett / Plan: [address] [place] [address] HMO / Product Type: [employer] Medicare /      NAME/AGE/GENDER: Xu Raza is a [de-identified] y.o. female   PRIMARY DIAGNOSIS:  Primary osteoarthritis of one knee, right [M17.11]   Procedure(s) and Anesthesia Type:     * RIGHT KNEE ARTHROPLASTY TOTAL / FNB / Carlie Fitting - Spinal (Right)  ICD-10: Treatment Diagnosis:    · Pain in Right Knee (M25.561)  · Stiffness of Right Knee, Not elsewhere classified (M25.661)  · Difficulty in walking, Not elsewhere classified (R26.2)  · Other abnormalities of gait and mobility (R26.89)      ASSESSMENT:     Ms. Emigdio Barbosa presents up in chair on contact and agreeable to afternoon therapy. Worked on gait training in the mena with verbal cues. In gym worked on TKA exercises with verbal cues progressing nicely with repetitions, will needs work on extension. After exercises worked on gait training back to room and stayed up in chair with ice in place and needs in reach. Hope to further progress in the morning. This section established at most recent assessment   PROBLEM LIST (Impairments causing functional limitations):  1.  Decreased Strength  2. Decreased Transfer Abilities  3. Decreased Ambulation Ability/Technique  4. Decreased Balance  5. Increased Pain  6. Decreased Activity Tolerance  7. Increased Fatigue  8. Decreased Flexibility/Joint Mobility  9. Decreased Knowledge of Precautions  10. Decreased Darke with Home Exercise Program   INTERVENTIONS PLANNED: (Benefits and precautions of physical therapy have been discussed with the patient.)  1. Balance Exercise  2. Bed Mobility  3. Cold  4. Gait Training  5. Home Exercise Program (HEP)  6. Therapeutic Exercise/Strengthening  7. Transfer Training  8. TKA education  9. Range of Motion: active/assisted/passive  10. Therapeutic Activities  11. Group Therapy     TREATMENT PLAN: Frequency/Duration: Follow patient BID   to address above goals. Rehabilitation Potential For Stated Goals: Good     RECOMMENDED REHABILITATION/EQUIPMENT: (at time of discharge pending progress): Continue Skilled Therapy and Rehab. HISTORY:   History of Present Injury/Illness (Reason for Referral):  S/P R TKA  Past Medical History/Comorbidities:   Ms. 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 She also has no past medical history of Aneurysm ([address] Arrhythmia; Asthma; Autoimmune disease ([address] CAD (coronary artery disease); Cancer ([address] Chronic kidney disease; Chronic obstructive pulmonary disease ([address] Chronic pain; Coagulation disorder ([address] Diabetes ([address] Difficult intubation; Endocarditis; GERD (gastroesophageal reflux disease); Heart failure ([address] Liver disease; Malignant hyperthermia due to anesthesia; Morbid obesity ([address] Nausea & vomiting; Nicotine vapor product user; Non-nicotine vapor product user; Pseudocholinesterase deficiency; PUD (peptic ulcer disease); Rheumatic fever; Seizures ([address] Sleep apnea; Stroke [facility]); Thromboembolus ([address] or Thyroid disease.   Ms. Ruth Corrales  has a past surgical history that includes hx knee arthroscopy (Right) and hx colonoscopy. Social History/Living Environment:   Home Environment: Private residence  # Steps to Enter: 0  One/Two Story Residence: One story  Living Alone: No  Support Systems: Child(britany), Family member(s), Spouse/Significant Other/Partner  Patient Expects to be Discharged to[de-identified] Rehabilitation facility (Wants to go to [facility])  Current DME Used/Available at Home: Cane, straight  Tub or Shower Type: Shower  Prior Level of Function/Work/Activity:  Functionally independent with ADls and amb   Number of Personal Factors/Comorbidities that affect the Plan of Care: 0: LOW COMPLEXITY   EXAMINATION:   Most Recent Physical Functioning:               RLE AROM  R Knee Flexion: 88  R Knee Extension: 38            Bed Mobility  Supine to Sit: Contact guard assistance;Minimum assistance  Sit to Supine: Contact guard assistance;Minimum assistance    Transfers  Sit to Stand: Minimum assistance  Stand to Sit: Minimum assistance    Balance  Sitting: Intact  Standing: Pull to stand; With support         Gait Training: Yes    Weight Bearing Status  Right Side Weight Bearing: As tolerated  Distance (ft): 78 Feet (ft) (and another 78 feet)  Ambulation - Level of Assistance: Minimal assistance  Assistive Device: Walker, rolling  Speed/Joseline: Pace decreased (<100 feet/min)  Step Length: Left shortened  Stance: Right decreased  Gait Abnormalities: Antalgic  Interventions: Safety awareness training;Verbal cues     Braces/Orthotics:     Right Knee Cold  Type: Cryocuff      Body Structures Involved:  1. Bones  2. Joints  3. Muscles Body Functions Affected:  1. Neuromusculoskeletal  2. Movement Related Activities and Participation Affected:  1. Mobility  2.  Self Care   Number of elements that affect the Plan of Care: 4+: HIGH COMPLEXITY   CLINICAL PRESENTATION:   Presentation: Stable and uncomplicated: LOW COMPLEXITY   CLINICAL DECISION MAKING:   [employer] AM-PAC 3 Clicks   Basic Mobility Inpatient Short Form  How much difficulty does the patient currently have. .. Unable A Lot A Little None   1. Turning over in bed (including adjusting bedclothes, sheets and blankets)? [] 1   [] 2   [x] 3   [] 4   2. Sitting down on and standing up from a chair with arms ( e.g., wheelchair, bedside commode, etc.)   [] 1   [] 2   [x] 3   [] 4   3. Moving from lying on back to sitting on the side of the bed? [] 1   [] 2   [x] 3   [] 4   How much help from another person does the patient currently need. .. Total A Lot A Little None   4. Moving to and from a bed to a chair (including a wheelchair)? [] 1   [] 2   [x] 3   [] 4   5. Need to walk in hospital room? [] 1   [] 2   [x] 3   [] 4   6. Climbing 3-5 steps with a railing? [] 1   [x] 2   [] 3   [] 4   © 2007, Trustees of [address] [place], [place] [id number], under license to [employer]. All rights reserved        Score:  Initial:17 Most Recent: X (Date: -- )    Interpretation of Tool:  Represents activities that are increasingly more difficult (i.e. Bed mobility, Transfers, Gait). Score 24 23 22-20 19-15 14-10 9-7 6     Modifier CH CI CJ CK CL CM CN      ? Mobility - Walking and Moving Around:     - CURRENT STATUS: CK - 40%-59% impaired, limited or restricted    - GOAL STATUS: CJ - 20%-39% impaired, limited or restricted    - D/C STATUS:  ---------------To be determined---------------  Payor: Jailene Garnica / Plan: [address] [place] [address] HMO / Product Type: Managed [employer] Medicare /      Medical Necessity:     · Patient demonstrates good rehab potential due to higher previous functional level. Reason for Services/Other Comments:  · Patient continues to require present interventions due to patient's inability to perform functional mobility independently.    Use of outcome tool(s) and clinical judgement create a POC that gives a: Clear prediction of patient's progress: LOW COMPLEXITY            TREATMENT:   (In addition to Assessment/Re-Assessment sessions the following treatments were rendered)     Pre-treatment Symptoms/Complaints:  none  Pain Initial: no reports of pain     Post Session:  No complaints     Gait Training (15 Minutes):  Gait training to improve and/or restore physical functioning as related to mobility and strength. Ambulated 78 Feet (ft) (and another 78 feet) with Minimal assistance using a Walker, rolling and minimal Safety awareness training;Verbal cues related to their stance phase and stride length to promote proper body alignment and promote proper body posture. Instruction in performance of walker use and gait sequencing to correct stance phase and stride length. Therapeutic Exercise: (30 Minutes (group)):  Exercises per grid below to improve mobility and strength. Required minimal verbal and manual cues to promote proper body alignment and promote proper body posture. Progressed repetitions as indicated. Date:  1/25/18 Date:   Date:     ACTIVITY/EXERCISE AM PM AM PM AM PM   GROUP THERAPY  []  [x]  []  []  []  []   Ankle Pumps 10 15       Quad Sets 10 15       Gluteal Sets 10 15       Hip ABd/ADduction 10 15       Straight Leg Raises 10 15       Knee Slides 10 15       Short Arc Quads  15       Long Arc Quads         Chair Slides  15                B = bilateral; AA = active assistive; A = active; P = passive      Treatment/Session Assessment:     Response to Treatment:  Tolerated well. .    Education:  [x] Home Exercises  [x] Fall Precautions   [] D/C Instruction Review  [x] Knee Prosthesis Review  [x] Walker Management/Safety [] Adaptive Equipment as Needed       Interdisciplinary Collaboration:   o Registered Nurse  o Rehabilitation Attendant    After treatment position/precautions:   o Up in chair  o Bed/Chair-wheels locked  o Call light within reach  o Family at bedside    Compliance with Program/Exercises: compliant all the time.     Recommendations/Intent for next treatment session:  Treatment next visit will focus on increasing Ms. White's independence with bed mobility, transfers, gait training, strength/ROM exercises, modalities for pain, and patient education.       Total Treatment Duration:  PT Patient Time In/Time Out  Time In: 1300  Time Out: 308 Annie Urena, PT

## 2018-01-25 NOTE — PROGRESS NOTES
2018         Post Op day: 1 Day Post-OpProcedure(s) (LRB):  RIGHT KNEE ARTHROPLASTY TOTAL / FNB / CEFERINO (Right)      Admit Date: 2018  Admit Diagnosis: Primary osteoarthritis of one knee, right [M17.11]    LAB:    Recent Results (from the past 24 hour(s))   URINALYSIS W/ RFLX MICROSCOPIC    Collection Time: 18  8:01 AM   Result Value Ref Range    Color YELLOW      Appearance CLEAR      Specific gravity 1.007 1.001 - 1.023      pH (UA) 7.0 5.0 - 9.0      Protein NEGATIVE  NEG mg/dL    Glucose NEGATIVE  NEG mg/dL    Ketone NEGATIVE  NEG mg/dL    Bilirubin NEGATIVE  NEG      Blood NEGATIVE  NEG      Urobilinogen 0.2 0.2 - 1.0 EU/dL    Nitrites NEGATIVE  NEG      Leukocyte Esterase NEGATIVE  NEG      Bacteria 0 0 /hpf   HEMOGLOBIN    Collection Time: 18  7:27 PM   Result Value Ref Range    HGB 9.6 (L) 11.7 - 15.4 g/dL   HEMOGLOBIN    Collection Time: 18  5:14 AM   Result Value Ref Range    HGB 9.6 (L) 11.7 - 36.7 g/dL   METABOLIC PANEL, BASIC    Collection Time: 18  5:14 AM   Result Value Ref Range    Sodium 141 136 - 145 mmol/L    Potassium 4.2 3.5 - 5.1 mmol/L    Chloride 104 98 - 107 mmol/L    CO2 34 (H) 21 - 32 mmol/L    Anion gap 3 (L) 7 - 16 mmol/L    Glucose 100 65 - 100 mg/dL    BUN 21 8 - 23 MG/DL    Creatinine 0.81 0.6 - 1.0 MG/DL    GFR est AA >60 >60 ml/min/1.73m2    GFR est non-AA >60 >60 ml/min/1.73m2    Calcium 8.3 8.3 - 10.4 MG/DL     Vital Signs:    Patient Vitals for the past 8 hrs:   BP Temp Pulse Resp SpO2   18 0330 115/54 98.9 °F (37.2 °C) 61 16 97 %   18 0036 112/52 98.5 °F (36.9 °C) 63 16 98 %     Temp (24hrs), Av.2 °F (36.8 °C), Min:94.6 °F (34.8 °C), Max:99.4 °F (37.4 °C)    Body mass index is 20.51 kg/(m^2).   Pain Control:   Pain Assessment  Pain Scale 1: Numeric (0 - 10)  Pain Intensity 1: 5  Pain Onset 1: 2 yrs  Pain Location 1: Knee  Pain Orientation 1: Right  Pain Description 1: Aching  Pain Intervention(s) 1: Medication (see MAR)    Subjective: Doing well, No complaints, No SOB, No Chest Pain, No Nausea or Vomitting     Objective: Vital Signs are Stable, No Acute Distress, Alert and Oriented, Dressing is Dry,  Neurovascular exam is normal.       PT/OT:            Assistive Device: Walker (comment)  RLE AROM  R Knee Flexion: 70  R Knee Extension: 15             Weight Bearing Status: WBAT    Meds:  [unfilled]  [unfilled]  [unfilled]    Assessment:   Patient Active Problem List   Diagnosis Code    Malaise and fatigue R53.81, R53.83    Diaphoresis/hyperhidrosis R61    Abnormal EKG R94.31    Bradycardia R00.1    Elevated blood pressure reading R03.0    Preop cardiovascular exam Z01.810    Status post total right knee replacement Z96.651             Plan: Continue Physical Therapy, Monitor  Hbg, rehab consult.         Signed By: Dottie Francisco MD

## 2018-01-25 NOTE — PROGRESS NOTES
Sitting in recliner. Medicated for pain with dilaudid po. Aquacel to R knee intact with spot of drainage. Iceman to knee. Good movement and sensation to LEs.

## 2018-01-25 NOTE — CONSULTS
Physical Medicine & Rehabilitation Note-consult    Patient: Abdifatah Kwong MRN: [id number]  SSN: [id number]    YOB: 1935  Age: [de-identified] y.o. Sex: female      Admit Date: 1/24/2018  Admitting Physician: Tameka Centeno MD    Medical Decision Making/Plan/Recommend: Gait impairment and functional deficits s/p R TKA. Therapy progressing steadily, without unusual barriers to progress. She is at minimum assist level with transfers, and ambulation using a RW. Patient plans for SNF discharge. Best care option is admission to a sub acute rehab program at [facility]. She will benefit from continued daily skilled rehabilitation efforts and regular medical and nursing care at SNF. Continue PT, OT for active/assisted/passive right TKA ROM, strengthening, mobility, transfers, and gait training. Chief Complaint : Gait dysfunction secondary to below. Admit Diagnosis: Primary osteoarthritis of one knee, right [M17.11]  right total knee arthroplasty   1/24/2018  Pain  DVT risk  Post op hemorrhagic anemia  Hypertension  Acute Rehab Dx:  Gait impairment  Mobility and ambulation deficits  Self Care/ADL deficits    Medical Dx:  Past Medical History:   Diagnosis Date    Arthritis     osteo    Hypertension     Psychiatric disorder     anxiety- citalopram daily    Status post total right knee replacement 1/24/2018     Subjective:     Date of Evaluation:  January 25, 2018    HPI: Abdifatah Kwong is a [de-identified] y.o. female patient at [address] [place] [address] who was admitted on 1/24/2018  by Tameka Centeno MD with below mentioned medical history, is being seen for Physical Medicine and Rehabilitation consult. Abdifatah Kwong had right knee pain and discomfort with walking and activity due to end stage DJD. The symptoms were not well controlled with conservative management and has limited the patient's function.  She underwent a right total knee arthroplasty per Dr. Tameka Centeno MD on 1/24/2018. Cathleen Solomon is seen and examined today. Medical Records reviewed. We are consulted to assist with rehab needs and placement. The patient's post operative course has been fairly well tolerated. Mild pain complaint, managed closely. Patient is to be WBAT RLE. She is starting to stand, taking steps with minimum assist. . She is limited by  right knee pain, decreased ROM and decreased strength. She denies any other debilities. She has been independent with ambulation, prior to admission, limited by right knee pain. Prior Level of Function/Work/Activity:  Functionally independent with ADls and amb    Current Level of Function:  bed mobility - min A, transfers - min A, decreased balance , ambulation -  15' with RW and min A. Family History   Problem Relation Age of Onset    Cancer Mother     Heart Disease Father       Social History   Substance Use Topics    Smoking status: Never Smoker    Smokeless tobacco: Never Used    Alcohol use 1.2 oz/week     2 Glasses of wine per week     Past Surgical History:   Procedure Laterality Date    HX COLONOSCOPY      HX KNEE ARTHROSCOPY Right       Prior to Admission medications    Medication Sig Start Date End Date Taking? Authorizing Provider   aspirin delayed-release 325 mg tablet Take 1 Tab by mouth every twelve (12) hours every twelve (12) hours for 35 days. 1/24/18 2/28/18 Yes JACEK Solorio   HYDROmorphone (DILAUDID) 2 mg tablet Take 1 Tab by mouth every four (4) hours as needed. Max Daily Amount: 12 mg. 1/24/18  Yes JACEK Solorio   aspirin delayed-release 81 mg tablet Take 81 mg by mouth daily. Per anesthesia protocol:instructed to take am of surgery. Yes Historical Provider   metoprolol succinate (TOPROL-XL) 25 mg XL tablet Take 25 mg by mouth nightly. Per anesthesia protocol:instructed to take am of surgery.   Indications: hypertension   Yes Historical Provider   amLODIPine (NORVASC) 2.5 mg tablet Take 1 Tab by mouth daily. Patient taking differently: Take 2.5 mg by mouth daily. Per anesthesia protocol:instructed to take am of surgery. 17  Yes Lui Ramos MD   citalopram (CELEXA) 10 mg tablet Take 10 mg by mouth daily. Per anesthesia protocol:instructed to take am of surgery. Indications: ANXIETY WITH DEPRESSION   Yes Historical Provider   multivitamin (ONE A DAY) tablet Take 1 Tab by mouth daily. Historical Provider   Omega-3-DHA-EPA-Fish Oil (FISH OIL) 1,000 mg (120 mg-180 mg) cap Take  by mouth. Historical Provider   calcium-cholecalciferol, d3, (CALCIUM 600 + D) 600-125 mg-unit tab Take  by mouth. Historical Provider   temazepam (RESTORIL) 15 mg capsule TAKE ONE CAPSULE BY MOUTH AT BEDTIME,as needed 10/31/17   Historical Provider   losartan-hydroCHLOROthiazide (HYZAAR) 100-25 mg per tablet Take 1 Tab by mouth daily. 17   Lui Ramos MD     Allergies   Allergen Reactions    Hydrocodone Unknown (comments)     \"too strong\"    Penicillins Itching    Sulfa (Sulfonamide Antibiotics) Itching        Review of Systems: +right knee pain, +antalgic gait. Denies chest pain, shortness of breath, cough, headache, visual problems, abdominal pain, dysurea, calf pain. Pertinent positives are as noted in the medical records and unremarkable otherwise. Objective:     Vitals:  Blood pressure 98/53, pulse 63, temperature 97.8 °F (36.6 °C), resp. rate 14, height 5' 4\" (1.626 m), weight 119 lb 7.8 oz (54.2 kg), SpO2 97 %, not currently breastfeeding. Temp (24hrs), Av.5 °F (36.9 °C), Min:97.8 °F (36.6 °C), Max:99 °F (37.2 °C)    BMI (calculated): 20.5 (18 2277)   Intake and Output:   1901 -  0700  In: 7080 [P.O.:500; I.V.:2738]  Out: 1750 [Urine:1650]    Physical Exam:   General: Alert and age appropriately oriented. Frail. No acute cardio respiratory distress.    HEENT: Normocephalic, no conjunctival pallor, no scleral icterus  Oral mucosa moist without cyanosis, no JVD Lungs: Clear to auscultation bilaterally. Respiration even and unlabored   Heart: Regular rate and rhythm, S1, S2   No  murmurs, clicks, rub or gallops   Abdomen: Soft, non-tender, non-distended. Genitourinary: defered   Neuromuscular:      Grossly no focal motor deficits. Right knee extension strong  Right ankle dorsiflexion 5/5  Right ankle plantarflexion 5/5  No sensory deficits distally BLE to soft touch. Skin/extremity: No calf tenderness BLE. No rashes, no marginal erythema.                                                                                          Labs/Studies:  Recent Results (from the past 72 hour(s))   TYPE & SCREEN    Collection Time: 01/24/18  6:14 AM   Result Value Ref Range    Crossmatch Expiration 01/27/2018     ABO/Rh(D) A NEGATIVE     Antibody screen NEG    GLUCOSE, POC    Collection Time: 01/24/18  6:21 AM   Result Value Ref Range    Glucose (POC) 96 65 - 100 mg/dL   URINALYSIS W/ RFLX MICROSCOPIC    Collection Time: 01/24/18  8:01 AM   Result Value Ref Range    Color YELLOW      Appearance CLEAR      Specific gravity 1.007 1.001 - 1.023      pH (UA) 7.0 5.0 - 9.0      Protein NEGATIVE  NEG mg/dL    Glucose NEGATIVE  NEG mg/dL    Ketone NEGATIVE  NEG mg/dL    Bilirubin NEGATIVE  NEG      Blood NEGATIVE  NEG      Urobilinogen 0.2 0.2 - 1.0 EU/dL    Nitrites NEGATIVE  NEG      Leukocyte Esterase NEGATIVE  NEG      Bacteria 0 0 /hpf   HEMOGLOBIN    Collection Time: 01/24/18  7:27 PM   Result Value Ref Range    HGB 9.6 (L) 11.7 - 15.4 g/dL   HEMOGLOBIN    Collection Time: 01/25/18  5:14 AM   Result Value Ref Range    HGB 9.6 (L) 11.7 - 28.8 g/dL   METABOLIC PANEL, BASIC    Collection Time: 01/25/18  5:14 AM   Result Value Ref Range    Sodium 141 136 - 145 mmol/L    Potassium 4.2 3.5 - 5.1 mmol/L    Chloride 104 98 - 107 mmol/L    CO2 34 (H) 21 - 32 mmol/L    Anion gap 3 (L) 7 - 16 mmol/L    Glucose 100 65 - 100 mg/dL    BUN 21 8 - 23 MG/DL    Creatinine 0.81 0.6 - 1.0 MG/DL GFR est AA >60 >60 ml/min/1.73m2    GFR est non-AA >60 >60 ml/min/1.73m2    Calcium 8.3 8.3 - 10.4 MG/DL   PLEASE READ & DOCUMENT PPD TEST IN 24 HRS    Collection Time: 01/25/18  9:25 AM   Result Value Ref Range    PPD  Negative    mm Induration 0 mm       Functional Assessment:  Reviewed participation and progress in therapies  Gross Assessment  AROM: Within functional limits (L LE) (01/24/18 1155)  Strength: Generally decreased, functional (L LE 4/5) (01/24/18 1155)  Sensation: Intact (L LE) (01/24/18 1155)   Bed Mobility  Supine to Sit: Contact guard assistance;Minimum assistance (01/25/18 0904)  Sit to Supine: Contact guard assistance;Minimum assistance (01/25/18 0904)   Balance  Sitting: Intact (01/25/18 0904)  Standing: Pull to stand; With support (01/25/18 5623)               Bed/Mat Mobility  Supine to Sit: Contact guard assistance;Minimum assistance (01/25/18 0904)  Sit to Supine: Contact guard assistance;Minimum assistance (01/25/18 0904)  Sit to Stand: Minimum assistance (01/25/18 0904)     Ambulation:  Gait  Speed/Joseline: Pace decreased (<100 feet/min) (01/25/18 0904)  Step Length: Left shortened (01/25/18 0904)  Stance: Right decreased (01/25/18 0904)  Gait Abnormalities: Antalgic;Decreased step clearance; Step to gait (01/25/18 0904)  Ambulation - Level of Assistance: Minimal assistance (01/25/18 0904)  Distance (ft): 50 Feet (ft) (01/25/18 0904)  Assistive Device: Walker, rolling (01/25/18 0904)  Interventions: Safety awareness training;Verbal cues (01/25/18 0904)  Duration: 15 Minutes (01/25/18 0904)    Impression/Plan:     Principal Problem:    Status post total right knee replacement (1/24/2018)        Current Facility-Administered Medications   Medication Dose Route Frequency Provider Last Rate Last Dose    amLODIPine (NORVASC) tablet 2.5 mg  2.5 mg Oral DAILY JACEK Nichols   Stopped at 01/25/18 0900    citalopram (CELEXA) tablet 10 mg  10 mg Oral DAILY JACEK Nichols   10 mg at 01/25/18 0921    metoprolol succinate (TOPROL-XL) XL tablet 25 mg  25 mg Oral QHS JACEK Pierce   25 mg at 01/24/18 2030    temazepam (RESTORIL) capsule 15 mg  15 mg Oral QHS PRN JACEK Pierce   15 mg at 01/24/18 2029    0.9% sodium chloride infusion  100 mL/hr IntraVENous CONTINUOUS JACEK Pierce 100 mL/hr at 01/24/18 1245 100 mL/hr at 01/24/18 1245    sodium chloride (NS) flush 5-10 mL  5-10 mL IntraVENous Q8H Alisha Pierce        sodium chloride (NS) flush 5-10 mL  5-10 mL IntraVENous PRN JACEK Pierce        acetaminophen (TYLENOL) tablet 1,000 mg  1,000 mg Oral Q6H JACEK Pierce   1,000 mg at 01/25/18 0558    HYDROmorphone (PF) (DILAUDID) injection 1 mg  1 mg IntraVENous Q3H PRN JACEK Pierce        naloxone [facility]) injection 0.2-0.4 mg  0.2-0.4 mg IntraVENous Q10MIN PRN JACEK Pierce        dexamethasone (DECADRON) injection 10 mg  10 mg IntraVENous ONCE Alisha Pierce        ondansetron [facility]) injection 4 mg  4 mg IntraVENous Q4H PRN JACEK Pierce        diphenhydrAMINE (BENADRYL) capsule 25 mg  25 mg Oral Q4H PRN JACEK Pierce   25 mg at 01/24/18 2239    senna-docusate (PERICOLACE) 8.6-50 mg per tablet 2 Tab  2 Tab Oral DAILY JACEK Pierce   2 Tab at 01/25/18 7767    aspirin delayed-release tablet 325 mg  325 mg Oral Q12H Alisha Pierce   325 mg at 01/25/18 0618    PPD read reminder  1 Each Other Q24H Irena Gates MD        losartan/hydroCHLOROthiazide [facility]) 100/25 mg   Oral DAILY Irena Gates MD   Stopped at 01/25/18 0900    HYDROmorphone (DILAUDID) tablet 2 mg  2 mg Oral Q3H PRN Irena Gates MD   2 mg at 01/25/18 1526        Recommendations: Recommend sub acute rehab at SNF. Continue Acute Rehab Program.  Coordination of rehab/medical care. Counseling of Physical Medicine & Rehab care issues management.    Will follow with SW/ /Admissions Coordinators regarding insurance approvals and acceptance. Rehabilitation Management/ Medical Management: 1. Devices:Walkers, Type: Rolling Walker  2. Consult:Rehab team including PT, OT,  and . 3. Disposition Rehab-discussed with patient. 4. Thigh-high or knee-high ORLY's when out of bed. 5. DVT Prophylaxis - started on aspirin 325mg bid x 30days. 6. Incentive spirometer Q1H while awake  7. Post op hemorrhagic anemia-   monitor. hgb 9.6  8. Activity: WBAT RLE  9. Planned Labs: CBC,BMP  10. Pain Control: continue scheduled tylenol, celebrex and  PRN meds. 11. Wound Care: Keep right TKA wound clean and dry and reinforce dressing PRN. May remove Aquacel 1 week post op ad replace with new one. Remove staples 12-14 post surgery, when incision appears appropriately closed and apply benzoin and 1/2\" steristrips. Follow up with Dr Karina Stock  2 weeks after discharge from rehab. Follow up with [facility] per instructions. Thank you for the opportunity to participate in the care of this patient.     Signed By: Ruy Galarza MD     January 25, 2018

## 2018-01-26 VITALS
HEART RATE: 70 BPM | RESPIRATION RATE: 17 BRPM | HEIGHT: 64 IN | SYSTOLIC BLOOD PRESSURE: 127 MMHG | WEIGHT: 119.49 LBS | OXYGEN SATURATION: 98 % | TEMPERATURE: 98 F | DIASTOLIC BLOOD PRESSURE: 60 MMHG | BODY MASS INDEX: 20.4 KG/M2

## 2018-01-26 LAB
HGB BLD-MCNC: 8.6 G/DL (ref 11.7–15.4)
MM INDURATION POC: 0 MM (ref 0–5)
PPD POC: NORMAL NEGATIVE

## 2018-01-26 PROCEDURE — 74011250637 HC RX REV CODE- 250/637: Performed by: PHYSICIAN ASSISTANT

## 2018-01-26 PROCEDURE — 74011250637 HC RX REV CODE- 250/637: Performed by: ORTHOPAEDIC SURGERY

## 2018-01-26 PROCEDURE — 77010033678 HC OXYGEN DAILY

## 2018-01-26 PROCEDURE — 97116 GAIT TRAINING THERAPY: CPT

## 2018-01-26 PROCEDURE — 36415 COLL VENOUS BLD VENIPUNCTURE: CPT | Performed by: PHYSICIAN ASSISTANT

## 2018-01-26 PROCEDURE — 97150 GROUP THERAPEUTIC PROCEDURES: CPT

## 2018-01-26 PROCEDURE — 85018 HEMOGLOBIN: CPT | Performed by: PHYSICIAN ASSISTANT

## 2018-01-26 RX ADMIN — AMLODIPINE BESYLATE 2.5 MG: 5 TABLET ORAL at 08:27

## 2018-01-26 RX ADMIN — ASPIRIN 325 MG: 325 TABLET, DELAYED RELEASE ORAL at 08:27

## 2018-01-26 RX ADMIN — CITALOPRAM HYDROBROMIDE 10 MG: 20 TABLET ORAL at 08:28

## 2018-01-26 RX ADMIN — DIPHENHYDRAMINE HYDROCHLORIDE 25 MG: 25 CAPSULE ORAL at 10:14

## 2018-01-26 RX ADMIN — HYDROMORPHONE HYDROCHLORIDE 2 MG: 2 TABLET ORAL at 02:25

## 2018-01-26 RX ADMIN — HYDROMORPHONE HYDROCHLORIDE 2 MG: 2 TABLET ORAL at 05:44

## 2018-01-26 RX ADMIN — SENNOSIDES AND DOCUSATE SODIUM 2 TABLET: 8.6; 5 TABLET ORAL at 08:27

## 2018-01-26 RX ADMIN — HYDROMORPHONE HYDROCHLORIDE 2 MG: 2 TABLET ORAL at 08:28

## 2018-01-26 RX ADMIN — ACETAMINOPHEN 1000 MG: 500 TABLET, FILM COATED ORAL at 13:37

## 2018-01-26 RX ADMIN — ACETAMINOPHEN 1000 MG: 500 TABLET, FILM COATED ORAL at 02:25

## 2018-01-26 RX ADMIN — HYDROCHLOROTHIAZIDE: 25 TABLET ORAL at 08:28

## 2018-01-26 RX ADMIN — ACETAMINOPHEN 1000 MG: 500 TABLET, FILM COATED ORAL at 08:27

## 2018-01-26 RX ADMIN — HYDROMORPHONE HYDROCHLORIDE 2 MG: 2 TABLET ORAL at 13:37

## 2018-01-26 NOTE — PROGRESS NOTES
Problem: Mobility Impaired (Adult and Pediatric)  Goal: *Acute Goals and Plan of Care (Insert Text)  GOALS (1-4 days):  (1.)Ms. Krystle Iniguez will move from supine to sit and sit to supine  in bed with SUPERVISION. (2.)Ms. Krystle Iniguez will transfer from bed to chair and chair to bed with SUPERVISION using the least restrictive device. (3.)Ms. Krystle Iniguez will ambulate with SUPERVISION for 100 feet with the least restrictive device. (4.)Ms. Krystle Iniguez will ambulate up/down 4 steps with bilateral  railing with CONTACT GUARD ASSIST with no device. (5.)Ms. Krystle Iniguez will increase right knee ROM to 5°-80°.  ________________________________________________________________________________________________    PHYSICAL THERAPY [facility] tKa: Daily Note, Treatment Day: 2nd, AM 1/26/2018  INPATIENT: Hospital Day: 3  Payor: Stephen Barrett / Plan: [address] [place] [address] HMO / Product Type: [employer] Medicare /      NAME/AGE/GENDER: Sedrick Arora is a [de-identified] y.o. female   PRIMARY DIAGNOSIS:  Primary osteoarthritis of one knee, right [M17.11]   Procedure(s) and Anesthesia Type:     * RIGHT KNEE ARTHROPLASTY TOTAL / FNB / [place] - Spinal (Right)  ICD-10: Treatment Diagnosis:    · Pain in Right Knee (M25.561)  · Stiffness of Right Knee, Not elsewhere classified (M25.661)  · Difficulty in walking, Not elsewhere classified (R26.2)  · Other abnormalities of gait and mobility (R26.89)      ASSESSMENT:     Ms. Krystle Iniguez presents up in chair on contact and ready for therapy. Worked on gait in the mena and pt progressing with reciprocal gait pattern and with distance. In gym worked on TKA exercises with verbal cues. Nice progress with repetition and improving with knee extension. Pt walked back to room and stayed up in chair with ice in place, needs in reach and spouse at bedside. Pt plans to go to [facility] today for continued therapy.      This section established at most recent assessment   PROBLEM LIST (Impairments causing functional limitations):  1. Decreased Strength  2. Decreased Transfer Abilities  3. Decreased Ambulation Ability/Technique  4. Decreased Balance  5. Increased Pain  6. Decreased Activity Tolerance  7. Increased Fatigue  8. Decreased Flexibility/Joint Mobility  9. Decreased Knowledge of Precautions  10. Decreased [place] with Home Exercise Program   INTERVENTIONS PLANNED: (Benefits and precautions of physical therapy have been discussed with the patient.)  1. Balance Exercise  2. Bed Mobility  3. Cold  4. Gait Training  5. Home Exercise Program (HEP)  6. Therapeutic Exercise/Strengthening  7. Transfer Training  8. TKA education  9. Range of Motion: active/assisted/passive  10. Therapeutic Activities  11. Group Therapy     TREATMENT PLAN: Frequency/Duration: Follow patient BID   to address above goals. Rehabilitation Potential For Stated Goals: Good     RECOMMENDED REHABILITATION/EQUIPMENT: (at time of discharge pending progress): Continue Skilled Therapy and Rehab. HISTORY:   History of Present Injury/Illness (Reason for Referral):  S/P R TKA  Past Medical History/Comorbidities:   Ms. Ho Silva  has a past medical history of Arthritis; Hypertension; Psychiatric disorder; and Status post total right knee replacement (1/24/2018). She also has no past medical history of Aneurysm ([address] Arrhythmia; Asthma; Autoimmune disease ([address] CAD (coronary artery disease); Cancer ([address] Chronic kidney disease; Chronic obstructive pulmonary disease ([address] Chronic pain; Coagulation disorder ([address] Diabetes ([address] Difficult intubation; Endocarditis; GERD (gastroesophageal reflux disease); Heart failure ([address] Liver disease; Malignant hyperthermia due to anesthesia; Morbid obesity ([address] Nausea & vomiting; Nicotine vapor product user; Non-nicotine vapor product user; Pseudocholinesterase deficiency; PUD (peptic ulcer disease); Rheumatic fever; Seizures ([address] Sleep apnea; Stroke [facility]); Thromboembolus ([address] or Thyroid disease. Ms. Roberto Jolley  has a past surgical history that includes hx knee arthroscopy (Right) and hx colonoscopy. Social History/Living Environment:   Home Environment: Private residence  # Steps to Enter: 0  One/Two Story Residence: One story  Living Alone: No  Support Systems: Child(britany), Family member(s), Spouse/Significant Other/Partner  Patient Expects to be Discharged to[de-identified] Rehabilitation facility (Wants to go to [facility])  Current DME Used/Available at Home: Cane, straight  Tub or Shower Type: Shower  Prior Level of Function/Work/Activity:  Functionally independent with ADls and amb   Number of Personal Factors/Comorbidities that affect the Plan of Care: 0: LOW COMPLEXITY   EXAMINATION:   Most Recent Physical Functioning:               RLE AROM  R Knee Flexion: 90  R Knee Extension: 10                 Transfers  Sit to Stand: Stand-by asssistance;Contact guard assistance  Stand to Sit: Stand-by asssistance;Contact guard assistance    Balance  Sitting: Intact  Standing: Intact; With support         Gait Training: Yes    Weight Bearing Status  Right Side Weight Bearing: As tolerated  Distance (ft): 142 Feet (ft)  Ambulation - Level of Assistance: Stand-by asssistance;Contact guard assistance  Assistive Device: Walker, rolling  Speed/Joseline: Pace decreased (<100 feet/min)  Step Length: Left shortened  Stance: Right decreased  Gait Abnormalities: Antalgic  Interventions: Safety awareness training;Verbal cues     Braces/Orthotics:     Right Knee Cold  Type: Cryocuff      Body Structures Involved:  1. Bones  2. Joints  3. Muscles Body Functions Affected:  1. Neuromusculoskeletal  2. Movement Related Activities and Participation Affected:  1. Mobility  2.  Self Care   Number of elements that affect the Plan of Care: 4+: HIGH COMPLEXITY   CLINICAL PRESENTATION:   Presentation: Stable and uncomplicated: LOW COMPLEXITY   CLINICAL DECISION MAKIN [facility] [address] [address] AM-PAC 6 Clicks   Basic Mobility Inpatient Short Form  How much difficulty does the patient currently have. .. Unable A Lot A Little None   1. Turning over in bed (including adjusting bedclothes, sheets and blankets)? [] 1   [] 2   [x] 3   [] 4   2. Sitting down on and standing up from a chair with arms ( e.g., wheelchair, bedside commode, etc.)   [] 1   [] 2   [x] 3   [] 4   3. Moving from lying on back to sitting on the side of the bed? [] 1   [] 2   [x] 3   [] 4   How much help from another person does the patient currently need. .. Total A Lot A Little None   4. Moving to and from a bed to a chair (including a wheelchair)? [] 1   [] 2   [x] 3   [] 4   5. Need to walk in hospital room? [] 1   [] 2   [x] 3   [] 4   6. Climbing 3-5 steps with a railing? [] 1   [x] 2   [] 3   [] 4   © 2007, Trustees of [address] [place] [address], under license to [employer]. All rights reserved        Score:  Initial:17 Most Recent: X (Date: -- )    Interpretation of Tool:  Represents activities that are increasingly more difficult (i.e. Bed mobility, Transfers, Gait). Score 24 23 22-20 19-15 14-10 9-7 6     Modifier CH CI CJ CK CL CM CN      ? Mobility - Walking and Moving Around:     - CURRENT STATUS: CK - 40%-59% impaired, limited or restricted    - GOAL STATUS: CJ - 20%-39% impaired, limited or restricted    - D/C STATUS:  ---------------To be determined---------------  Payor: Kelly Carrasco / Plan: [address] [place] [address] HMO / Product Type: Managed Care Medicare /      Medical Necessity:     · Patient demonstrates good rehab potential due to higher previous functional level. Reason for Services/Other Comments:  · Patient continues to require present interventions due to patient's inability to perform functional mobility independently.    Use of outcome tool(s) and clinical judgement create a POC that gives a: Clear prediction of patient's progress: LOW COMPLEXITY            TREATMENT:   (In addition to Assessment/Re-Assessment sessions the following treatments were rendered)     Pre-treatment Symptoms/Complaints:  none  Pain Initial: no reports of pain     Post Session:  No complaints     Gait Training (10 Minutes):  Gait training to improve and/or restore physical functioning as related to mobility and strength. Ambulated 142 Feet (ft) with Stand-by asssistance;Contact guard assistance using a Walker, rolling and minimal Safety awareness training;Verbal cues related to their stance phase and stride length to promote proper body alignment and promote proper body posture. Instruction in performance of walker use and gait sequencing to correct stance phase and stride length. Therapeutic Exercise: (30 Minutes (group)):  Exercises per grid below to improve mobility and strength. Required minimal verbal and manual cues to promote proper body alignment and promote proper body posture. Progressed repetitions as indicated. Date:  1/25/18 Date:  1/26/18 Date:     ACTIVITY/EXERCISE AM PM AM PM AM PM   GROUP THERAPY  []  [x]  [x]  []  []  []   Ankle Pumps 10 15 20      Quad Sets 10 15 20      Gluteal Sets 10 15 20      Hip ABd/ADduction 10 15 20      Straight Leg Raises 10 15 20      Knee Slides 10 15 20      Short Arc Quads  15 20      Long Arc Quads         Chair Slides  15 20               B = bilateral; AA = active assistive; A = active; P = passive      Treatment/Session Assessment:     Response to Treatment:  Tolerated well. .    Education:  [x] Home Exercises  [x] Fall Precautions   [] D/C Instruction Review  [x] Knee Prosthesis Review  [x] Walker Management/Safety [] Adaptive Equipment as Needed       Interdisciplinary Collaboration:   o Registered Nurse  o Rehabilitation Attendant    After treatment position/precautions:   o Up in chair  o Bed/Chair-wheels locked  o Call light within reach  o Family at bedside    Compliance with Program/Exercises: compliant all the time.     Recommendations/Intent for next treatment session:  Treatment next visit will focus on increasing MsAdilson Taylor Armando independence with bed mobility, transfers, gait training, strength/ROM exercises, modalities for pain, and patient education.       Total Treatment Duration:  PT Patient Time In/Time Out  Time In: 1030  Time Out: [address] [place], [place]

## 2018-01-26 NOTE — PROGRESS NOTES
Medicated for pain with dilaudid po. Sitting up in bed eating. Aquacel intact to R knee with spots of drainage, iceman on. NV checks WDL.

## 2018-01-26 NOTE — DISCHARGE SUMMARY
REHABILITATION DISCHARGE SUMMARY     Patient: Angelo Florentino MRN: [id number]  SSN: [id number]    YOB: 1935  Age: [de-identified] y.o. Sex: female      Date: 1/25/2018  Admit Date: 1/24/2018  Discharge Date: 1/25/2018    Admitting Physician: Lella Castleman., MD   Primary Care Physician: Prem Villatoro MD     Admission Condition: good    Chief Complaint : Gait dysfunction secondary to below. Admit Diagnosis: Primary osteoarthritis of one knee, right [M17.11]  right total knee arthroplasty   1/24/2018  Pain  DVT risk  Post op hemorrhagic anemia  Hypertension  Acute Rehab Dx:  Gait impairment  Mobility and ambulation deficits  Self Care/ADL deficits    HPI: Angelo Florentino is a [de-identified] y.o. female patient at [address] [place] [address] who was admitted on 1/24/2018  by Lella Castleman., MD with below mentioned medical history, is being seen for Physical Medicine and Rehabilitation. Angelo Florentino had right knee pain and discomfort with walking and activity due to end stage DJD. The symptoms were not well controlled with conservative management and has limited the patient's function. She underwent a right total knee arthroplasty per Dr. Lella Castleman., MD on 1/24/2018. Angelo Florentino is seen and examined today. Medical Records reviewed. The patient's post operative course has been fairly well tolerated. Mild pain complaint, managed closely. Patient is to be WBAT RLE. She is starting to stand, taking steps with minimum assist. . She is limited by  right knee pain, decreased ROM and decreased strength. She denies any other debilities.   She has been independent with ambulation, prior to admission, limited by right knee pain.       Rehabiitation Course:   Functional  Level On Admission: Walk: Moderate [place]  Functional Level At Discharge: Walk: Contact Guard Assist  Home Architecture: Home Situation  Home Environment: Private residence (01/24/18 1153)  # Steps to Enter: 0 (01/24/18 1153)  One/Two Story Residence: One story (01/24/18 1153)  Living Alone: No (01/24/18 1153)  Support Systems: Child(britany); Family member(s); Spouse/Significant Other/Partner (01/24/18 1153)  Patient Expects to be Discharged to[de-identified] Rehabilitation facility (Wants to go to [facility]) (01/24/18 1153)  Current DME Used/Available at Home: martin Charlton (01/24/18 1153)  Tub or Shower Type: Shower (01/24/18 1153)     Past Medical History:   Diagnosis Date    Arthritis     osteo    Hypertension     Psychiatric disorder     anxiety- citalopram daily    Status post total right knee replacement 1/24/2018      Past Surgical History:   Procedure Laterality Date    HX COLONOSCOPY      HX KNEE ARTHROSCOPY Right       Family History   Problem Relation Age of Onset    Cancer Mother     Heart Disease Father       Social History   Substance Use Topics    Smoking status: Never Smoker    Smokeless tobacco: Never Used    Alcohol use 1.2 oz/week     2 Glasses of wine per week       Allergies   Allergen Reactions    Hydrocodone Unknown (comments)     \"too strong\"    Penicillins Itching    Sulfa (Sulfonamide Antibiotics) Itching       Prior to Admission medications    Medication Sig Start Date End Date Taking? Authorizing Provider   aspirin delayed-release 325 mg tablet Take 1 Tab by mouth every twelve (12) hours every twelve (12) hours for 35 days. 1/24/18 2/28/18 Yes JACEK William   HYDROmorphone (DILAUDID) 2 mg tablet Take 1 Tab by mouth every four (4) hours as needed. Max Daily Amount: 12 mg. 1/24/18  Yes JACEK William   aspirin delayed-release 81 mg tablet Take 81 mg by mouth daily. Per anesthesia protocol:instructed to take am of surgery. Yes Historical Provider   metoprolol succinate (TOPROL-XL) 25 mg XL tablet Take 25 mg by mouth nightly. Per anesthesia protocol:instructed to take am of surgery.   Indications: hypertension   Yes Historical Provider   amLODIPine (NORVASC) 2.5 mg tablet Take 1 Tab by mouth daily. Patient taking differently: Take 2.5 mg by mouth daily. Per anesthesia protocol:instructed to take am of surgery. 8/29/17  Yes Stephanie Padilla MD   citalopram (CELEXA) 10 mg tablet Take 10 mg by mouth daily. Per anesthesia protocol:instructed to take am of surgery. Indications: ANXIETY WITH DEPRESSION   Yes Historical Provider   multivitamin (ONE A DAY) tablet Take 1 Tab by mouth daily. Historical Provider   Omega-3-DHA-EPA-Fish Oil (FISH OIL) 1,000 mg (120 mg-180 mg) cap Take  by mouth. Historical Provider   calcium-cholecalciferol, d3, (CALCIUM 600 + D) 600-125 mg-unit tab Take  by mouth. Historical Provider   temazepam (RESTORIL) 15 mg capsule TAKE ONE CAPSULE BY MOUTH AT BEDTIME,as needed 10/31/17   Historical Provider   losartan-hydroCHLOROthiazide (HYZAAR) 100-25 mg per tablet Take 1 Tab by mouth daily.  8/29/17   Stephanie Padilla MD       Current Medications:  Current Facility-Administered Medications   Medication Dose Route Frequency Provider Last Rate Last Dose    amLODIPine (NORVASC) tablet 2.5 mg  2.5 mg Oral DAILY JACEK Solorio   Stopped at 01/25/18 0900    citalopram (CELEXA) tablet 10 mg  10 mg Oral DAILY Antwan Solorio   10 mg at 01/25/18 6358    metoprolol succinate (TOPROL-XL) XL tablet 25 mg  25 mg Oral QHS JACEK Solorio   25 mg at 01/25/18 2055    temazepam (RESTORIL) capsule 15 mg  15 mg Oral QHS PRN JACEK Solorio   15 mg at 01/25/18 2055    0.9% sodium chloride infusion  100 mL/hr IntraVENous CONTINUOUS JACEK Solorio 100 mL/hr at 01/24/18 1245 100 mL/hr at 01/24/18 1245    sodium chloride (NS) flush 5-10 mL  5-10 mL IntraVENous Q8H Antwan Solorio        sodium chloride (NS) flush 5-10 mL  5-10 mL IntraVENous PRN JACEK Solorio        acetaminophen (TYLENOL) tablet 1,000 mg  1,000 mg Oral Q6H JACEK Solorio   1,000 mg at 01/25/18 1810    HYDROmorphone (PF) (DILAUDID) injection 1 mg  1 mg IntraVENous Q3H PRN Piedad Barthel JACEK Lemus        naloxone [facility]) injection 0.2-0.4 mg  0.2-0.4 mg IntraVENous Q10MIN PRN JACEK Madsen        dexamethasone (DECADRON) injection 10 mg  10 mg IntraVENous ONCE Antwan Madsen        ondansetron [facility]) injection 4 mg  4 mg IntraVENous Q4H PRN JACEK Madsen        diphenhydrAMINE (BENADRYL) capsule 25 mg  25 mg Oral Q4H PRN JACEK Madsen   25 mg at 01/25/18 2055    senna-docusate (PERICOLACE) 8.6-50 mg per tablet 2 Tab  2 Tab Oral DAILY JACEK Madsen   2 Tab at 01/25/18 8769    aspirin delayed-release tablet 325 mg  325 mg Oral Q12H Antwan Madsen   325 mg at 01/25/18 2055    PPD read reminder  1 Each Other Q24H Tati Hernandez MD        losartan/hydroCHLOROthiazide [facility]) 100/25 mg   Oral DAILY Tati Hernandez MD   Stopped at 01/25/18 0900    HYDROmorphone (DILAUDID) tablet 2 mg  2 mg Oral Q3H PRN Tati Hernandez MD   2 mg at 01/25/18 1810        Review of Systems: Denies chest pain, shortness of breath, cough, headache, visual problems, abdominal pain, dysurea, calf pain. Pertinent positives are as noted in the medical records and unremarkable otherwise. Vital Signs: Patient Vitals for the past 8 hrs:   BP Temp Pulse Resp SpO2   01/25/18 1728 - - - - 91 %   01/25/18 1614 104/57 98.1 °F (36.7 °C) 62 16 94 %        Physical Exam:   General: Alert and age appropriately oriented. No acute pain behavior. No acute cardio respiratory distress. HEENT: Normocephalic. Oral mucosa moist without cyanosis. Lungs: Clear to auscultation anteriorly, no wheezing  Respiration even and unlabored   Heart: Regular rate and rhythm, S1, S2   No  murmurs, clicks, rub or gallops   Abdomen: Soft, non-tender, nondistended. Bowel sounds present   Genitourinary: defered   Neuromuscular:      Grossly no focal neurological deficits noted.   L Ankle dorsiflexion 5/5   L Ankle plantarflexion 5/5  R Ankle dorsiflexion 5/5   R Ankle plantarflexion 5/5  No sensory deficits. Skin/extremity: No rashes, no erythema. Calves soft. Wound covered.      Skin Incision(s)/Wound(s):        Lab Review:   Recent Results (from the past 72 hour(s))   TYPE & SCREEN    Collection Time: 01/24/18  6:14 AM   Result Value Ref Range    Crossmatch Expiration 01/27/2018     ABO/Rh(D) A NEGATIVE     Antibody screen NEG    GLUCOSE, POC    Collection Time: 01/24/18  6:21 AM   Result Value Ref Range    Glucose (POC) 96 65 - 100 mg/dL   URINALYSIS W/ RFLX MICROSCOPIC    Collection Time: 01/24/18  8:01 AM   Result Value Ref Range    Color YELLOW      Appearance CLEAR      Specific gravity 1.007 1.001 - 1.023      pH (UA) 7.0 5.0 - 9.0      Protein NEGATIVE  NEG mg/dL    Glucose NEGATIVE  NEG mg/dL    Ketone NEGATIVE  NEG mg/dL    Bilirubin NEGATIVE  NEG      Blood NEGATIVE  NEG      Urobilinogen 0.2 0.2 - 1.0 EU/dL    Nitrites NEGATIVE  NEG      Leukocyte Esterase NEGATIVE  NEG      Bacteria 0 0 /hpf   HEMOGLOBIN    Collection Time: 01/24/18  7:27 PM   Result Value Ref Range    HGB 9.6 (L) 11.7 - 15.4 g/dL   HEMOGLOBIN    Collection Time: 01/25/18  5:14 AM   Result Value Ref Range    HGB 9.6 (L) 11.7 - 82.1 g/dL   METABOLIC PANEL, BASIC    Collection Time: 01/25/18  5:14 AM   Result Value Ref Range    Sodium 141 136 - 145 mmol/L    Potassium 4.2 3.5 - 5.1 mmol/L    Chloride 104 98 - 107 mmol/L    CO2 34 (H) 21 - 32 mmol/L    Anion gap 3 (L) 7 - 16 mmol/L    Glucose 100 65 - 100 mg/dL    BUN 21 8 - 23 MG/DL    Creatinine 0.81 0.6 - 1.0 MG/DL    GFR est AA >60 >60 ml/min/1.73m2    GFR est non-AA >60 >60 ml/min/1.73m2    Calcium 8.3 8.3 - 10.4 MG/DL   PLEASE READ & DOCUMENT PPD TEST IN 24 HRS    Collection Time: 01/25/18  9:25 AM   Result Value Ref Range    PPD  Negative    mm Induration 0 mm       PT Initial  PT Most Recent   AROM: Within functional limits (L LE) (01/24/18 1155) Within functional limits (L LE) (01/24/18 1155)         Strength: Generally decreased, functional (L LE 4/5) (01/24/18 1155) Generally decreased, functional (L LE 4/5) (01/24/18 1155)               Sensation: Intact (L LE) (01/24/18 1155) Intact (L LE) (01/24/18 1155)         Distance (ft): 15 Feet (ft) (01/24/18 1155) 78 Feet (ft) (and another 78 feet) (01/25/18 1345)   Assistive Device: Walker, rolling (01/24/18 1155) Walker, rolling (01/25/18 1345)       OT Initial OT Most Recent                                               ST Initial ST Most Recent                        Principal Problem:    Status post total right knee replacement (1/24/2018)          Condition on discharge from [facility] to SNF:  good  Rehabilitation  potential : Good   Goals in Rehab : Patient to reach maximal rehabilitation potential in functional mobility,ambulation and ADL/ self care skills ; to improve strength and endurance  Plan / Disposition :STR-Rehab  as discussed with patient/ family and the Case management/ Social service team  Expected Length Of Stay : Depending on pace of progress in mobility and self care in PT and OT ,therapies    Discharge Instructions:  Rehabilitation Management/ Medical Management: 1. Devices:Walkers, Type: Rolling Walker  2. Consult:Rehab team including PT, OT,  and . 3. Disposition Rehab-discussed with patient. 4. Thigh-high or knee-high ORLY's when out of bed. 5. DVT Prophylaxis - aspirin 325mg bid x 30days. Please notify surgeon at [facility] if DVT diagnosed. 6. Incentive spirometer Q1H while awake  7. Post op hemorrhagic anemia- monitor. 8. Activity: WBAT RLE  9. Planned Labs: CBC,BMP  10. Pain Control:   Continue scheduled tylenol, celebrex and  PRN meds. 11. Wound Care: May remove Aquacel 1 week post op and replace with Tegaderm and sterile gauze dressing. Keep wound clean and dry and reinforce dressing PRN. Remove staples 12-14 post surgery, when incision appears appropriately closed and apply benzoin and 1/2\" steristrips.    12. In case of complications: Please notify surgeon at [facility] if suspect infection, cellulitis, wound dehiscence or instrument failure, and if considering starting antibiotic. Follow up with ORTHO per instructions. [place]         Transfer Medications:            acetaminophen (TYLENOL) tablet 1,000 mg Ordered Dose: 1,000 mg Route: Oral Frequency: EVERY 8 HOURS x 1 week then change to prn.        senna-docusate (PERICOLACE) 8.6-50 mg per tablet 2 Tab Ordered Dose: 2 Tab Route: Oral Frequency: DAILY       Current Discharge Medication List      START taking these medications    Details   HYDROmorphone (DILAUDID) 2 mg tablet Take 1 Tab by mouth every four (4) hours as needed. Max Daily Amount: 12 mg. Associated Diagnoses: Status post total right knee replacement         CONTINUE these medications which have CHANGED    Details   aspirin delayed-release 325 mg tablet Take 1 Tab by mouth every twelve (12) hours every twelve (12) hours for 35 days. Comments: This medication is to prevent blood clots for 5 weeks after surgery. Associated Diagnoses: Status post total right knee replacement         CONTINUE these medications which have NOT CHANGED    Details   metoprolol succinate (TOPROL-XL) 25 mg XL tablet Take 25 mg by mouth nightly. Indications: hypertension      amLODIPine (NORVASC) 2.5 mg tablet Take 1 Tab by mouth daily. citalopram (CELEXA) 10 mg tablet Take 10 mg by mouth daily. Indications: ANXIETY WITH DEPRESSION      multivitamin (ONE A DAY) tablet Take 1 Tab by mouth daily. calcium-cholecalciferol, d3, (CALCIUM 600 + D) 600-125 mg-unit tab Take  by mouth. temazepam (RESTORIL) 15 mg capsule TAKE ONE CAPSULE BY MOUTH AT BEDTIME,as needed  Refills: 0      losartan-hydroCHLOROthiazide (HYZAAR) 100-25 mg per tablet Take 1 Tab by mouth daily.            STOP taking these medications       Omega-3-DHA-EPA-Fish Oil (FISH OIL) 1,000 mg (120 mg-180 mg) cap Comments:   Reason for Stopping: O.K. Admit to sub acute rehab today. Discharge time: 35 minutes.     Signed By: Delia Lainez MD     January 25, 2018

## 2018-01-26 NOTE — PROGRESS NOTES
2018         Post Op day: 2 Days Post-OpProcedure(s) (LRB):  RIGHT KNEE ARTHROPLASTY TOTAL / FNB / CEFERINO (Right)      Admit Date: 2018  Admit Diagnosis: Primary osteoarthritis of one knee, right [M17.11]    LAB:    Recent Results (from the past 24 hour(s))   PLEASE READ & DOCUMENT PPD TEST IN 24 HRS    Collection Time: 18  9:25 AM   Result Value Ref Range    PPD  Negative    mm Induration 0 mm   HEMOGLOBIN    Collection Time: 18  4:57 AM   Result Value Ref Range    HGB 8.6 (L) 11.7 - 15.4 g/dL     Vital Signs:    Patient Vitals for the past 8 hrs:   BP Temp Pulse Resp SpO2   18 0225 129/65 98 °F (36.7 °C) 65 17 95 %     Temp (24hrs), Av.1 °F (36.7 °C), Min:97.8 °F (36.6 °C), Max:98.2 °F (36.8 °C)    Body mass index is 20.51 kg/(m^2). Pain Control:   Pain Assessment  Pain Scale 1: Numeric (0 - 10)  Pain Intensity 1: 4  Pain Onset 1: 2 yrs  Pain Location 1: Knee  Pain Orientation 1: Right  Pain Description 1: Aching  Pain Intervention(s) 1: Medication (see MAR)    Subjective: Doing well, No complaints, No SOB, No Chest Pain, No Nausea or Vomitting     Objective: Vital Signs are Stable, No Acute Distress, Alert and Oriented, Dressing is Dry,  Neurovascular exam is normal.       PT/OT:            Assistive Device: Walker (comment)  RLE AROM  R Knee Flexion: 88  R Knee Extension: 38             Weight Bearing Status: WBAT    Meds:  [unfilled]  [unfilled]  [unfilled]    Assessment:   Patient Active Problem List   Diagnosis Code    Malaise and fatigue R53.81, R53.83    Diaphoresis/hyperhidrosis R61    Abnormal EKG R94.31    Bradycardia R00.1    Elevated blood pressure reading R03.0    Preop cardiovascular exam Z01.810    Status post total right knee replacement Z96.651             Plan: Continue Physical Therapy, Monitor  Hbg, rehab consult.         Signed By: Nate Gleason MD

## 2018-01-26 NOTE — PROGRESS NOTES
TRANSFER - OUT REPORT:    Verbal report given to Alie Whitten at [phone] on Perry Cohen  being transferred to [address] [place] [address] for routine progression of care   Rehab. Report consisted of patients Situation, Background, Assessment and   Recommendations(SBAR). Information from the following report(s) SBAR and MAR was reviewed with the receiving nurse. Opportunity for questions and clarification was provided.

## 2018-01-26 NOTE — PROGRESS NOTES
Rec'd call from [address] at [facility]. They have rec'd [employer] precert. Patient transferred to [facility] for rehab.   Ryan Arevalo

## 2018-01-26 NOTE — PROGRESS NOTES
Medicated for pain with dilaudid po. Given packet for rehab. Family taking pt to [facility] via car.

## 2018-02-05 ENCOUNTER — HOME HEALTH ADMISSION (OUTPATIENT)
Dept: HOME HEALTH SERVICES | Facility: HOME HEALTH | Age: 83
End: 2018-02-05
Payer: MEDICARE

## 2018-02-07 ENCOUNTER — HOME CARE VISIT (OUTPATIENT)
Dept: SCHEDULING | Facility: HOME HEALTH | Age: 83
End: 2018-02-07
Payer: MEDICARE

## 2018-02-07 VITALS
SYSTOLIC BLOOD PRESSURE: 120 MMHG | TEMPERATURE: 97.2 F | HEART RATE: 71 BPM | DIASTOLIC BLOOD PRESSURE: 60 MMHG | RESPIRATION RATE: 18 BRPM

## 2018-02-07 PROCEDURE — 400013 HH SOC

## 2018-02-07 PROCEDURE — 3331090001 HH PPS REVENUE CREDIT

## 2018-02-07 PROCEDURE — 3331090002 HH PPS REVENUE DEBIT

## 2018-02-07 PROCEDURE — G0151 HHCP-SERV OF PT,EA 15 MIN: HCPCS

## 2018-02-08 PROCEDURE — 3331090002 HH PPS REVENUE DEBIT

## 2018-02-08 PROCEDURE — 3331090001 HH PPS REVENUE CREDIT

## 2018-02-09 ENCOUNTER — HOME CARE VISIT (OUTPATIENT)
Dept: SCHEDULING | Facility: HOME HEALTH | Age: 83
End: 2018-02-09
Payer: MEDICARE

## 2018-02-09 VITALS
SYSTOLIC BLOOD PRESSURE: 120 MMHG | RESPIRATION RATE: 17 BRPM | HEART RATE: 80 BPM | DIASTOLIC BLOOD PRESSURE: 75 MMHG | TEMPERATURE: 96.5 F

## 2018-02-09 PROCEDURE — 3331090002 HH PPS REVENUE DEBIT

## 2018-02-09 PROCEDURE — G0157 HHC PT ASSISTANT EA 15: HCPCS

## 2018-02-09 PROCEDURE — 3331090001 HH PPS REVENUE CREDIT

## 2018-02-10 PROCEDURE — 3331090002 HH PPS REVENUE DEBIT

## 2018-02-10 PROCEDURE — 3331090001 HH PPS REVENUE CREDIT

## 2018-02-11 PROCEDURE — 3331090002 HH PPS REVENUE DEBIT

## 2018-02-11 PROCEDURE — 3331090001 HH PPS REVENUE CREDIT

## 2018-02-12 PROCEDURE — 3331090002 HH PPS REVENUE DEBIT

## 2018-02-12 PROCEDURE — 3331090001 HH PPS REVENUE CREDIT

## 2018-02-13 ENCOUNTER — HOME CARE VISIT (OUTPATIENT)
Dept: SCHEDULING | Facility: HOME HEALTH | Age: 83
End: 2018-02-13
Payer: MEDICARE

## 2018-02-13 VITALS
DIASTOLIC BLOOD PRESSURE: 74 MMHG | HEART RATE: 72 BPM | RESPIRATION RATE: 17 BRPM | SYSTOLIC BLOOD PRESSURE: 110 MMHG | TEMPERATURE: 97.9 F

## 2018-02-13 PROCEDURE — 3331090001 HH PPS REVENUE CREDIT

## 2018-02-13 PROCEDURE — 3331090002 HH PPS REVENUE DEBIT

## 2018-02-13 PROCEDURE — G0157 HHC PT ASSISTANT EA 15: HCPCS

## 2018-02-14 PROCEDURE — 3331090001 HH PPS REVENUE CREDIT

## 2018-02-14 PROCEDURE — 3331090002 HH PPS REVENUE DEBIT

## 2018-02-15 ENCOUNTER — HOME CARE VISIT (OUTPATIENT)
Dept: SCHEDULING | Facility: HOME HEALTH | Age: 83
End: 2018-02-15
Payer: MEDICARE

## 2018-02-15 VITALS
RESPIRATION RATE: 17 BRPM | TEMPERATURE: 97.9 F | DIASTOLIC BLOOD PRESSURE: 70 MMHG | SYSTOLIC BLOOD PRESSURE: 118 MMHG | HEART RATE: 72 BPM

## 2018-02-15 PROCEDURE — 3331090001 HH PPS REVENUE CREDIT

## 2018-02-15 PROCEDURE — 3331090002 HH PPS REVENUE DEBIT

## 2018-02-15 PROCEDURE — G0157 HHC PT ASSISTANT EA 15: HCPCS

## 2018-02-16 PROCEDURE — 3331090002 HH PPS REVENUE DEBIT

## 2018-02-16 PROCEDURE — 3331090001 HH PPS REVENUE CREDIT

## 2018-02-17 PROCEDURE — 3331090001 HH PPS REVENUE CREDIT

## 2018-02-17 PROCEDURE — 3331090002 HH PPS REVENUE DEBIT

## 2018-02-18 PROCEDURE — 3331090001 HH PPS REVENUE CREDIT

## 2018-02-18 PROCEDURE — 3331090002 HH PPS REVENUE DEBIT

## 2018-02-19 PROCEDURE — 3331090002 HH PPS REVENUE DEBIT

## 2018-02-19 PROCEDURE — 3331090001 HH PPS REVENUE CREDIT

## 2018-02-20 ENCOUNTER — HOME CARE VISIT (OUTPATIENT)
Dept: SCHEDULING | Facility: HOME HEALTH | Age: 83
End: 2018-02-20
Payer: MEDICARE

## 2018-02-20 VITALS
SYSTOLIC BLOOD PRESSURE: 112 MMHG | TEMPERATURE: 97.1 F | HEART RATE: 76 BPM | DIASTOLIC BLOOD PRESSURE: 74 MMHG | RESPIRATION RATE: 17 BRPM

## 2018-02-20 PROCEDURE — G0157 HHC PT ASSISTANT EA 15: HCPCS

## 2018-02-20 PROCEDURE — 3331090002 HH PPS REVENUE DEBIT

## 2018-02-20 PROCEDURE — 3331090001 HH PPS REVENUE CREDIT

## 2018-02-21 PROCEDURE — 3331090002 HH PPS REVENUE DEBIT

## 2018-02-21 PROCEDURE — 3331090001 HH PPS REVENUE CREDIT

## 2018-02-22 ENCOUNTER — HOME CARE VISIT (OUTPATIENT)
Dept: SCHEDULING | Facility: HOME HEALTH | Age: 83
End: 2018-02-22
Payer: MEDICARE

## 2018-02-22 VITALS
TEMPERATURE: 96.2 F | RESPIRATION RATE: 17 BRPM | SYSTOLIC BLOOD PRESSURE: 120 MMHG | DIASTOLIC BLOOD PRESSURE: 68 MMHG | HEART RATE: 76 BPM

## 2018-02-22 PROCEDURE — 3331090002 HH PPS REVENUE DEBIT

## 2018-02-22 PROCEDURE — 3331090001 HH PPS REVENUE CREDIT

## 2018-02-22 PROCEDURE — G0151 HHCP-SERV OF PT,EA 15 MIN: HCPCS

## 2018-02-23 PROCEDURE — 3331090002 HH PPS REVENUE DEBIT

## 2018-02-23 PROCEDURE — 3331090001 HH PPS REVENUE CREDIT

## 2018-02-24 PROCEDURE — 3331090002 HH PPS REVENUE DEBIT

## 2018-02-24 PROCEDURE — 3331090001 HH PPS REVENUE CREDIT

## 2018-02-25 PROCEDURE — 3331090001 HH PPS REVENUE CREDIT

## 2018-02-25 PROCEDURE — 3331090002 HH PPS REVENUE DEBIT

## 2018-02-26 ENCOUNTER — HOSPITAL ENCOUNTER (OUTPATIENT)
Dept: PHYSICAL THERAPY | Age: 83
Discharge: HOME | End: 2018-02-26
Payer: MEDICARE

## 2018-02-26 PROCEDURE — G8979 MOBILITY GOAL STATUS: HCPCS

## 2018-02-26 PROCEDURE — 97161 PT EVAL LOW COMPLEX 20 MIN: CPT

## 2018-02-26 PROCEDURE — G8978 MOBILITY CURRENT STATUS: HCPCS

## 2018-02-26 PROCEDURE — 97110 THERAPEUTIC EXERCISES: CPT

## 2018-02-27 NOTE — THERAPY EVALUATION
Pavan Perdomo  : 1935  Payor: Garrett Recio / Plan: [address] HMO / Product Type: Managed Care Medicare /  [address]  at [facility] Therapy  [address], [facility], [address]  [phone]   [phone]         OUTPATIENT PHYSICAL THERAPY:Initial Assessment 2018    ICD-10: Treatment Diagnosis:   R26.2 Difficulty in walking, not elsewhere classified      M25.661 Stiffness of right knee, not elsewhere classified      M25.561 Pain in right knee     Precautions/Allergies:   Hydrocodone; Penicillins; and Sulfa (sulfonamide antibiotics)   Fall Risk Score: 1 (? 5 = High Risk)  MD Orders: Eval and treat  MEDICAL/REFERRING DIAGNOSIS:  Presence of right artificial knee joint [Z96.651]   DATE OF ONSET:   REFERRING PHYSICIAN: Colten Bailey., *  RETURN PHYSICIAN APPOINTMENT: 1 week     INITIAL ASSESSMENT:  Ms. Katherine Barton presents with decreased R knee ROM and decreased strength of LE's post R TKA on 18. Pt with fair gait with straight cane. Pt is progressing well. Work to increase ROM to normalize gait. Work aggressively to increase strength to enable return to prior activity level. PROBLEM LIST (Impacting functional limitations):  1. Decreased Strength  2. Decreased ADL/Functional Activities  3. Decreased Ambulation Ability/Technique  4. Increased Pain  5. Decreased Flexibility/Joint Mobility  6. Edema/Girth INTERVENTIONS PLANNED:  1. Gait Training  2. Home Exercise Program (HEP)  3. Manual Therapy  4. Range of Motion (ROM)  5. Therapeutic Activites  6. Therapeutic Exercise/Strengthening   TREATMENT PLAN:  Effective Dates: 18 TO 18. Frequency/Duration: 2 times a week for 60 days and upon reassessment,  will adjust frequency and duration as progress indicates. GOALS: (Goals have been discussed and agreed upon with patient.)  Short-Term Functional Goals: Time Frame: 4 weeks   1.  Establish independent HEP with no cueing to increase ROM and strength. 2. Increase R knee ROM to 0-130 to normalize gait   3. Independent gait without assistive device in home and community. 4. Increase LE strength so able to initiate reciprocal pattern on stairs. 5. Ambulate 15 min with minimal to no increase in R knee pain    Discharge Goals: Time Frame: 8 weeks   1. Improve score on LEFS by 9 points to enable prolonged sitting, standing and ambulation   2. Increase LE strength 1/2 to 1 grade to enable reciprocal pattern on stairs  3. Able to ambulate 30 minutes with minimal to no increase in R knee pain   4. Increase R knee strength and decrease R knee pain 2 levels to enable return to water aerobics. Rehabilitation Potential For Stated Goals: Good  Regarding Jana White's therapy, I certify that the treatment plan above will be carried out by a therapist or under their direction. Thank you for this referral,  Deepika Zuñiga, PT     Referring Physician Signature: Jono Spears., *              Date                    The information in this section was collected on 2/26/18 (except where otherwise noted). HISTORY:   History of Present Injury/Illness (Reason for Referral):  Pt is post R TKA on Jan 24,2018. She went to [facility] for rehab for 11 days. She then had 3 weeks of Home PT until 2/22/18. She is referred to outpatient PT for ROM and strengthening of R knee and LE. Work to normalize gait. Work to increase R knee ROM to normalize gait and to increase strength to enable return to prior activity level. Past Medical History/Comorbidities:   Ms. 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 She also has no past medical history of Aneurysm ([address] Arrhythmia; Asthma; Autoimmune disease ([address] CAD (coronary artery disease); Cancer ([address] Chronic kidney disease; Chronic obstructive pulmonary disease ([address]  Chronic pain; Coagulation disorder ([facility] Diabetes ([facility] Difficult intubation; Endocarditis; GERD (gastroesophageal reflux disease); Heart failure ([facility] Liver disease; Malignant hyperthermia due to anesthesia; Morbid obesity ([facility] Nausea & vomiting; Nicotine vapor product user; Non-nicotine vapor product user; Pseudocholinesterase deficiency; PUD (peptic ulcer disease); Rheumatic fever; Seizures ([facility] Sleep apnea; Stroke [facility]); Thromboembolus ([facility] or Thyroid disease. Ms. Roberto Jolley  has a past surgical history that includes hx knee arthroscopy (Right) and hx colonoscopy. Social History/Living Environment:     Lives with spouse in 1 story home. Stairs into home   Prior Level of Function/Work/Activity:  Retired. Return to water aerobics, line dancing  Dominant Side:         RIGHT  Current Medications:       Current Outpatient Prescriptions:     diphenhydrAMINE-acetaminophen (TYLENOL PM EXTRA STRENGTH)  mg tab, Take 1-2 Tabs by mouth every eight (8) hours. , Disp: , Rfl:     aspirin delayed-release 325 mg tablet, Take 1 Tab by mouth every twelve (12) hours every twelve (12) hours for 35 days. , Disp: 70 Tab, Rfl: 0    HYDROmorphone (DILAUDID) 2 mg tablet, Take 1 Tab by mouth every four (4) hours as needed. Max Daily Amount: 12 mg., Disp: 40 Tab, Rfl: 0    multivitamin (ONE A DAY) tablet, Take 1 Tab by mouth daily. , Disp: , Rfl:     calcium-cholecalciferol, d3, (CALCIUM 600 + D) 600-125 mg-unit tab, Take  by mouth., Disp: , Rfl:     metoprolol succinate (TOPROL-XL) 25 mg XL tablet, Take 25 mg by mouth nightly. Per anesthesia protocol:instructed to take am of surgery. Indications: hypertension, Disp: , Rfl:     temazepam (RESTORIL) 15 mg capsule, TAKE ONE CAPSULE BY MOUTH AT BEDTIME,as needed, Disp: , Rfl: 0    losartan-hydroCHLOROthiazide (HYZAAR) 100-25 mg per tablet, Take 1 Tab by mouth daily. , Disp: 90 Tab, Rfl: 3    amLODIPine (NORVASC) 2.5 mg tablet, Take 1 Tab by mouth daily.  (Patient taking differently: Take 2.5 mg by mouth daily. Per anesthesia protocol:instructed to take am of surgery.), Disp: 30 Tab, Rfl: 11    citalopram (CELEXA) 10 mg tablet, Take 10 mg by mouth daily. Per anesthesia protocol:instructed to take am of surgery. Indications: ANXIETY WITH DEPRESSION, Disp: , Rfl:    Tylenol for pain   Date Last Reviewed:  2/26/2018   Number of Personal Factors/Comorbidities that affect the Plan of Care: 1-2: MODERATE COMPLEXITY   EXAMINATION:   Observation/Orthostatic Postural Assessment:          Pt to PT with straight cane. Pt with decreased knee ext at heel strike and stance. Decreased toe off. Slow gait with small step length. Tight HS and HC. In 90/90, HS are 55. .  Minimal edema. Palpation:        Tender along medial patella and back of R knee  Functional Mobility:         Gait/Ambulation:  Independent with cane         Stairs:  Difficulty with stairs   ROM:     R knee -4 to 120   L knee 0-130                                    Strength:     Knee ext  R 4-/5,  L 4+/5    Knee ext  R 4-/5,  L 4+/5         Hip flexion  R 4/5,  L 4+/5     Hip abd  R 4-/5,  L 4-/5            Special Tests:   Neurological Screen: Intact to light touch   Balance:  Good with cane    Body Structures Involved:  1. Bones  2. Joints  3. Muscles  4. Ligaments Body Functions Affected:  1. Sensory/Pain  2. Neuromusculoskeletal  3. Movement Related Activities and Participation Affected:  1. Learning and Applying Knowledge  2. General Tasks and Demands  3. Mobility  4. Domestic Life  5. Community, Social and [place]   Number of elements (examined above) that affect the Plan of Care: 3: MODERATE COMPLEXITY   CLINICAL PRESENTATION:   Presentation: Evolving clinical presentation with changing clinical characteristics: MODERATE COMPLEXITY   CLINICAL DECISION MAKING:   Outcome Measure:    Tool Used: Lower Extremity Functional Scale (LEFS)  Score:  Initial: 43/80 Most Recent: X/80 (Date: -- )   Interpretation of Score: 20 questions each scored on a 5 point scale with 0 representing \"extreme difficulty or unable to perform\" and 4 representing \"no difficulty\". The lower the score, the greater the functional disability. 80/80 represents no disability. Minimal detectable change is 9 points. Score 80 79-63 62-48 47-32 31-16 15-1 0   Modifier CH CI CJ CK CL CM CN     ? Mobility - Walking and Moving Around:     - CURRENT STATUS: CK - 40%-59% impaired, limited or restricted    - GOAL STATUS: CJ - 20%-39% impaired, limited or restricted    - D/C STATUS:  ---------------To be determined---------------    Medical Necessity:   · Patient is expected to demonstrate progress in strength, range of motion and gait to increase independence with home and community activities . Reason for Services/Other Comments:  · Patient continues to require skilled intervention due to decreased ROM and strength of R knee and LE. Use of outcome tool(s) and clinical judgement create a POC that gives a: Clear prediction of patient's progress: LOW COMPLEXITY            TREATMENT:   (In addition to Assessment/Re-Assessment sessions the following treatments were rendered)  Pre-treatment Symptoms/Complaints:  Pain and stiffness of R knee. Weakness of both LE's. Fair gait   Pain: Initial:   Pain Intensity 1: 5  Post Session:  3     THERAPEUTIC EXERCISE: (15 minutes):  Exercises per grid below to improve mobility, strength and gait. Required moderate verbal and manual cues to promote proper body alignment, promote proper body posture and promote proper body mechanics. Progressed resistance, range and repetitions as indicated. Instructed in exercise program of QS (quadriceps sets), SLR (straight leg raises), hip abd,  SAQ's (short arc quadriceps), and LAQ's (long arc quadriceps). Instructed in HS and HC stretch. Instructed in 6 in step up. To use ball for active assist knee flexion. NuStep x 8 min at level 3.      Evaluation (  xx   ):    Manual Therapy ( ): Patella and patella tendon mobs. Soft tissue work to [employer] and HS. PROM for knee flex and extn. Therapeutic Modalities:    HEP: As above; handouts given to patient for all exercises. Treatment/Session Assessment:  Pt is post R TKA on Jan 24,2018. She has been seen in inpatient rehab and Home PT. Pt is progressing well. Will work aggressively to attain increase ROM to normalize gait . Will work to increase strength to enable return to prior activity level including line dancing and water aerobics. Should continue to progress well. · Response to Treatment:  Understood exercises. .  · Compliance with Program/Exercises: Will assess as treatment progresses. · Recommendations/Intent for next treatment session: \"Next visit will focus on ROM and strengtheing of R knee and LE. Gait training. \".   Total Treatment Duration:  PT Patient Time In/Time Out  Time In: 1000  Time Out: 1100  Treatment number  [id number], PT

## 2018-02-27 NOTE — PROGRESS NOTES
Ambulatory/Rehab Services H2 Model Falls Risk Assessment    Risk Factor Pts. ·   Confusion/Disorientation/Impulsivity  []    4 ·   Symptomatic Depression  []   2 ·   Altered Elimination  []   1 ·   Dizziness/Vertigo  []   1 ·   Gender (Male)  []   1 ·   Any administered antiepileptics (anticonvulsants):  []   2 ·   Any administered benzodiazepines:  []   1 ·   Visual Impairment (specify):  []   1 ·   Portable Oxygen Use  []   1 ·   Orthostatic ? BP  []   1 ·   History of Recent Falls (within 3 mos.)  []   5     Ability to Rise from Chair (choose one) Pts. ·   Ability to rise in a single movement  []   0 ·   Pushes up, successful in one attempt  [x]   1 ·   Multiple attempts, but successful  []   3 ·   Unable to rise without assistance  []   4   Total: (5 or greater = High Risk) 1     Falls Prevention Plan:   []                Physical Limitations to Exercise (specify):   []                Mobility Assistance Device (type):   []                Exercise/Equipment Adaptation (specify):    ©2010 [facility] [address] Patent #[id number].  Federal Law prohibits the replication, distribution or use without written permission from [facility] [employer]

## 2018-03-01 ENCOUNTER — HOSPITAL ENCOUNTER (OUTPATIENT)
Dept: PHYSICAL THERAPY | Age: 83
Discharge: HOME | End: 2018-03-01
Payer: MEDICARE

## 2018-03-01 PROCEDURE — 97110 THERAPEUTIC EXERCISES: CPT

## 2018-03-01 PROCEDURE — 97140 MANUAL THERAPY 1/> REGIONS: CPT

## 2018-03-02 NOTE — PROGRESS NOTES
Hansel Edilberto  : 1935  Payor: Andrea Lynn / Plan: [address] HMO / Product Type: Managed Care Medicare /  [facility]  at [place] [address] Therapy  [address], [address]  [phone]   [phone]         OUTPATIENT PHYSICAL [address] Note 3/1/2018    ICD-10: Treatment Diagnosis:   R26.2 Difficulty in walking, not elsewhere classified      M25.661 Stiffness of right knee, not elsewhere classified      M25.561 Pain in right knee     Precautions/Allergies:   Hydrocodone; Penicillins; and Sulfa (sulfonamide antibiotics)   Fall Risk Score: 1 (? 5 = High Risk)  MD Orders: Eval and treat  MEDICAL/REFERRING DIAGNOSIS:  Presence of right artificial knee joint [Z96.651]   DATE OF ONSET:   REFERRING PHYSICIAN: Joe Jimenez., *  RETURN PHYSICIAN APPOINTMENT: 1 week     INITIAL ASSESSMENT:  Ms. Peyman Rodrigues presents with decreased R knee ROM and decreased strength of LE's post R TKA on 18. Pt with fair gait with straight cane. Pt is progressing well. Work to increase ROM to normalize gait. Work aggressively to increase strength to enable return to prior activity level. PROBLEM LIST (Impacting functional limitations):  1. Decreased Strength  2. Decreased ADL/Functional Activities  3. Decreased Ambulation Ability/Technique  4. Increased Pain  5. Decreased Flexibility/Joint Mobility  6. Edema/Girth INTERVENTIONS PLANNED:  1. Gait Training  2. Home Exercise Program (HEP)  3. Manual Therapy  4. Range of Motion (ROM)  5. Therapeutic Activites  6. Therapeutic Exercise/Strengthening   TREATMENT PLAN:  Effective Dates: 18 TO 18. Frequency/Duration: 2 times a week for 60 days and upon reassessment,  will adjust frequency and duration as progress indicates. GOALS: (Goals have been discussed and agreed upon with patient.)  Short-Term Functional Goals: Time Frame: 4 weeks   1.  Establish independent HEP with no cueing to increase ROM and strength. 2. Increase R knee ROM to 0-130 to normalize gait   3. Independent gait without assistive device in home and community. 4. Increase LE strength so able to initiate reciprocal pattern on stairs. 5. Ambulate 15 min with minimal to no increase in R knee pain    Discharge Goals: Time Frame: 8 weeks   1. Improve score on LEFS by 9 points to enable prolonged sitting, standing and ambulation   2. Increase LE strength 1/2 to 1 grade to enable reciprocal pattern on stairs  3. Able to ambulate 30 minutes with minimal to no increase in R knee pain   4. Increase R knee strength and decrease R knee pain 2 levels to enable return to water aerobics. Rehabilitation Potential For Stated Goals: Good  Regarding Ana White's therapy, I certify that the treatment plan above will be carried out by a therapist or under their direction. Thank you for this referral,  Thuy Epps, PT     Referring Physician Signature: Nathaly Trejo., *              Date                    The information in this section was collected on 2/26/18 (except where otherwise noted). HISTORY:   History of Present Injury/Illness (Reason for Referral):  Pt is post R TKA on Jan 24,2018. She went to [facility] for rehab for 11 days. She then had 3 weeks of Home PT until 2/22/18. She is referred to outpatient PT for ROM and strengthening of R knee and LE. Work to normalize gait. Work to increase R knee ROM to normalize gait and to increase strength to enable return to prior activity level. Past Medical History/Comorbidities:   Ms. 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 She also has no past medical history of Aneurysm ([address] Arrhythmia; Asthma; Autoimmune disease ([address] CAD (coronary artery disease); Cancer ([address] Chronic kidney disease; Chronic obstructive pulmonary disease ([address]  Chronic pain; Coagulation disorder ([facility] Diabetes ([facility] Difficult intubation; Endocarditis; GERD (gastroesophageal reflux disease); Heart failure ([facility] Liver disease; Malignant hyperthermia due to anesthesia; Morbid obesity ([facility] Nausea & vomiting; Nicotine vapor product user; Non-nicotine vapor product user; Pseudocholinesterase deficiency; PUD (peptic ulcer disease); Rheumatic fever; Seizures ([facility] Sleep apnea; Stroke [facility]); Thromboembolus ([facility] or Thyroid disease. Ms. Brian Rizvi  has a past surgical history that includes hx knee arthroscopy (Right) and hx colonoscopy. Social History/Living Environment:     Lives with spouse in 1 story home. Stairs into home   Prior Level of Function/Work/Activity:  Retired. Return to water aerobics, line dancing  Dominant Side:         RIGHT  Current Medications:       Current Outpatient Prescriptions:     diphenhydrAMINE-acetaminophen (TYLENOL PM EXTRA STRENGTH)  mg tab, Take 1-2 Tabs by mouth every eight (8) hours. , Disp: , Rfl:     HYDROmorphone (DILAUDID) 2 mg tablet, Take 1 Tab by mouth every four (4) hours as needed. Max Daily Amount: 12 mg., Disp: 40 Tab, Rfl: 0    multivitamin (ONE A DAY) tablet, Take 1 Tab by mouth daily. , Disp: , Rfl:     calcium-cholecalciferol, d3, (CALCIUM 600 + D) 600-125 mg-unit tab, Take  by mouth., Disp: , Rfl:     metoprolol succinate (TOPROL-XL) 25 mg XL tablet, Take 25 mg by mouth nightly. Per anesthesia protocol:instructed to take am of surgery. Indications: hypertension, Disp: , Rfl:     temazepam (RESTORIL) 15 mg capsule, TAKE ONE CAPSULE BY MOUTH AT BEDTIME,as needed, Disp: , Rfl: 0    losartan-hydroCHLOROthiazide (HYZAAR) 100-25 mg per tablet, Take 1 Tab by mouth daily. , Disp: 90 Tab, Rfl: 3    amLODIPine (NORVASC) 2.5 mg tablet, Take 1 Tab by mouth daily. (Patient taking differently: Take 2.5 mg by mouth daily.  Per anesthesia protocol:instructed to take am of surgery.), Disp: 30 Tab, Rfl: 11    citalopram (CELEXA) 10 mg tablet, Take 10 mg by mouth daily. Per anesthesia protocol:instructed to take am of surgery. Indications: ANXIETY WITH DEPRESSION, Disp: , Rfl:    Tylenol for pain   Date Last Reviewed:  3/1/2018   Number of Personal Factors/Comorbidities that affect the Plan of Care: 1-2: MODERATE COMPLEXITY   EXAMINATION:   Observation/Orthostatic Postural Assessment:          Pt to PT with straight cane. Pt with decreased knee ext at heel strike and stance. Decreased toe off. Slow gait with small step length. Tight HS and HC. In 90/90, HS are 55. .  Minimal edema. Palpation:        Tender along medial patella and back of R knee  Functional Mobility:         Gait/Ambulation:  Independent with cane         Stairs:  Difficulty with stairs   ROM:     R knee -4 to 120   L knee 0-130                                    Strength:     Knee ext  R 4-/5,  L 4+/5    Knee ext  R 4-/5,  L 4+/5         Hip flexion  R 4/5,  L 4+/5     Hip abd  R 4-/5,  L 4-/5            Special Tests:   Neurological Screen: Intact to light touch   Balance:  Good with cane    Body Structures Involved:  1. Bones  2. Joints  3. Muscles  4. Ligaments Body Functions Affected:  1. Sensory/Pain  2. Neuromusculoskeletal  3. Movement Related Activities and Participation Affected:  1. Learning and Applying Knowledge  2. General Tasks and Demands  3. Mobility  4. Domestic Life  5. Community, Social and [place]   Number of elements (examined above) that affect the Plan of Care: 3: MODERATE COMPLEXITY   CLINICAL PRESENTATION:   Presentation: Evolving clinical presentation with changing clinical characteristics: MODERATE COMPLEXITY   CLINICAL DECISION MAKING:   Outcome Measure: Tool Used: Lower Extremity Functional Scale (LEFS)  Score:  Initial: 43/80 Most Recent: X/80 (Date: -- )   Interpretation of Score: 20 questions each scored on a 5 point scale with 0 representing \"extreme difficulty or unable to perform\" and 4 representing \"no difficulty\".   The lower the score, the greater the functional disability. 80/80 represents no disability. Minimal detectable change is 9 points. Score 80 79-63 62-48 47-32 31-16 15-1 0   Modifier CH CI CJ CK CL CM CN     ? Mobility - Walking and Moving Around:     - CURRENT STATUS: CK - 40%-59% impaired, limited or restricted    - GOAL STATUS: CJ - 20%-39% impaired, limited or restricted    - D/C STATUS:  ---------------To be determined---------------    Medical Necessity:   · Patient is expected to demonstrate progress in strength, range of motion and gait to increase independence with home and community activities . Reason for Services/Other Comments:  · Patient continues to require skilled intervention due to decreased ROM and strength of R knee and LE. Use of outcome tool(s) and clinical judgement create a POC that gives a: Clear prediction of patient's progress: LOW COMPLEXITY            TREATMENT:   (In addition to Assessment/Re-Assessment sessions the following treatments were rendered)  Pre-treatment Symptoms/Complaints:  Pain and stiffness of R knee. Weakness of both LE's. Fair gait   Pain: Initial:   Pain Intensity 1: 3  Post Session:  3     THERAPEUTIC EXERCISE: (15 minutes):  Exercises per grid below to improve mobility, strength and gait. Required moderate verbal and manual cues to promote proper body alignment, promote proper body posture and promote proper body mechanics. Progressed resistance, range and repetitions as indicated. Instructed in exercise program of QS (quadriceps sets), SLR (straight leg raises), hip abd,  SAQ's (short arc quadriceps), and LAQ's (long arc quadriceps). Instructed in HS and HC stretch. Instructed in 6 in step up. To use ball for active assist knee flexion. NuStep x 8 min at level 3. Evaluation (  xx   ):    Manual Therapy (      ): Patella and patella tendon mobs. Soft tissue work to [employer] and HS. PROM for knee flex and extn.           Therapeutic Modalities:    HEP: As above; handouts given to patient for all exercises. Treatment/Session Assessment:  Pt is post R TKA on Jan 24,2018. She has been seen in inpatient rehab and Home PT. Pt is progressing well. Will work aggressively to attain increase ROM to normalize gait . Will work to increase strength to enable return to prior activity level including line dancing and water aerobics. Should continue to progress well. · Response to Treatment:  Understood exercises. .  · Compliance with Program/Exercises: Will assess as treatment progresses. · Recommendations/Intent for next treatment session: \"Next visit will focus on ROM and strengtheing of R knee and LE. Gait training. \".   Total Treatment Duration:  PT Patient Time In/Time Out  Time In: 0100  Time Out: 0200  Treatment number  2  Edison De PT

## 2018-03-06 ENCOUNTER — HOSPITAL ENCOUNTER (OUTPATIENT)
Dept: PHYSICAL THERAPY | Age: 83
Discharge: HOME | End: 2018-03-06
Payer: MEDICARE

## 2018-03-06 PROCEDURE — 97140 MANUAL THERAPY 1/> REGIONS: CPT

## 2018-03-06 PROCEDURE — 97110 THERAPEUTIC EXERCISES: CPT

## 2018-03-06 NOTE — PROGRESS NOTES
Saltese Needles  : 1935  Payor: [place] / Plan: [address] HMO / Product Type: Managed Care Medicare /  [facility] [place]  at [place] [address] Therapy  [address], [address]  [phone]   [phone]         OUTPATIENT PHYSICAL [address] Note 3/6/2018    ICD-10: Treatment Diagnosis:   R26.2 Difficulty in walking, not elsewhere classified      M25.661 Stiffness of right knee, not elsewhere classified      M25.561 Pain in right knee     Precautions/Allergies:   Hydrocodone; Penicillins; and Sulfa (sulfonamide antibiotics)   Fall Risk Score: 1 (? 5 = High Risk)  MD Orders: Eval and treat  MEDICAL/REFERRING DIAGNOSIS:  Presence of right artificial knee joint [Z96.651]   DATE OF ONSET:   REFERRING PHYSICIAN: Nathaly Trejo., *  RETURN PHYSICIAN APPOINTMENT: 1 week     INITIAL ASSESSMENT:  Ms. Kanika Bridges presents with decreased R knee ROM and decreased strength of LE's post R TKA on 18. Pt with fair gait with straight cane. Pt is progressing well. Work to increase ROM to normalize gait. Work aggressively to increase strength to enable return to prior activity level. PROBLEM LIST (Impacting functional limitations):  1. Decreased Strength  2. Decreased ADL/Functional Activities  3. Decreased Ambulation Ability/Technique  4. Increased Pain  5. Decreased Flexibility/Joint Mobility  6. Edema/Girth INTERVENTIONS PLANNED:  1. Gait Training  2. Home Exercise Program (HEP)  3. Manual Therapy  4. Range of Motion (ROM)  5. Therapeutic Activites  6. Therapeutic Exercise/Strengthening   TREATMENT PLAN:  Effective Dates: 18 TO 18. Frequency/Duration: 2 times a week for 60 days and upon reassessment,  will adjust frequency and duration as progress indicates. GOALS: (Goals have been discussed and agreed upon with patient.)  Short-Term Functional Goals: Time Frame: 4 weeks   1.  Establish independent HEP with no cueing to increase ROM and strength. 2. Increase R knee ROM to 0-130 to normalize gait   3. Independent gait without assistive device in home and community. 4. Increase LE strength so able to initiate reciprocal pattern on stairs. 5. Ambulate 15 min with minimal to no increase in R knee pain    Discharge Goals: Time Frame: 8 weeks   1. Improve score on LEFS by 9 points to enable prolonged sitting, standing and ambulation   2. Increase LE strength 1/2 to 1 grade to enable reciprocal pattern on stairs  3. Able to ambulate 30 minutes with minimal to no increase in R knee pain   4. Increase R knee strength and decrease R knee pain 2 levels to enable return to water aerobics. Rehabilitation Potential For Stated Goals: Good  Regarding Lien White's therapy, I certify that the treatment plan above will be carried out by a therapist or under their direction. Thank you for this referral,  Promise Hilton, PT     Referring Physician Signature: Bienvenido Parra., *              Date                    The information in this section was collected on 2/26/18 (except where otherwise noted). HISTORY:   History of Present Injury/Illness (Reason for Referral):  Pt is post R TKA on Jan 24,2018. She went to [facility] for rehab for 11 days. She then had 3 weeks of Home PT until 2/22/18. She is referred to outpatient PT for ROM and strengthening of R knee and LE. Work to normalize gait. Work to increase R knee ROM to normalize gait and to increase strength to enable return to prior activity level. Past Medical History/Comorbidities:   Ms. 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 She also has no past medical history of Aneurysm ([address] Arrhythmia; Asthma; Autoimmune disease ([address] CAD (coronary artery disease); Cancer ([address] Chronic kidney disease; Chronic obstructive pulmonary disease ([address]  Chronic pain; Coagulation disorder ([facility] Diabetes ([facility] Difficult intubation; Endocarditis; GERD (gastroesophageal reflux disease); Heart failure ([facility] Liver disease; Malignant hyperthermia due to anesthesia; Morbid obesity ([facility] Nausea & vomiting; Nicotine vapor product user; Non-nicotine vapor product user; Pseudocholinesterase deficiency; PUD (peptic ulcer disease); Rheumatic fever; Seizures ([facility] Sleep apnea; Stroke [facility]); Thromboembolus ([facility] or Thyroid disease. Ms. Thuy Moreno  has a past surgical history that includes hx knee arthroscopy (Right) and hx colonoscopy. Social History/Living Environment:     Lives with spouse in 1 story home. Stairs into home   Prior Level of Function/Work/Activity:  Retired. Return to water aerobics, line dancing  Dominant Side:         RIGHT  Current Medications:       Current Outpatient Prescriptions:     diphenhydrAMINE-acetaminophen (TYLENOL PM EXTRA STRENGTH)  mg tab, Take 1-2 Tabs by mouth every eight (8) hours. , Disp: , Rfl:     HYDROmorphone (DILAUDID) 2 mg tablet, Take 1 Tab by mouth every four (4) hours as needed. Max Daily Amount: 12 mg., Disp: 40 Tab, Rfl: 0    multivitamin (ONE A DAY) tablet, Take 1 Tab by mouth daily. , Disp: , Rfl:     calcium-cholecalciferol, d3, (CALCIUM 600 + D) 600-125 mg-unit tab, Take  by mouth., Disp: , Rfl:     metoprolol succinate (TOPROL-XL) 25 mg XL tablet, Take 25 mg by mouth nightly. Per anesthesia protocol:instructed to take am of surgery. Indications: hypertension, Disp: , Rfl:     temazepam (RESTORIL) 15 mg capsule, TAKE ONE CAPSULE BY MOUTH AT BEDTIME,as needed, Disp: , Rfl: 0    losartan-hydroCHLOROthiazide (HYZAAR) 100-25 mg per tablet, Take 1 Tab by mouth daily. , Disp: 90 Tab, Rfl: 3    amLODIPine (NORVASC) 2.5 mg tablet, Take 1 Tab by mouth daily. (Patient taking differently: Take 2.5 mg by mouth daily.  Per anesthesia protocol:instructed to take am of surgery.), Disp: 30 Tab, Rfl: 11    citalopram (CELEXA) 10 mg tablet, Take 10 mg by mouth daily. Per anesthesia protocol:instructed to take am of surgery. Indications: ANXIETY WITH DEPRESSION, Disp: , Rfl:    Tylenol for pain   Date Last Reviewed:  3/6/2018   Number of Personal Factors/Comorbidities that affect the Plan of Care: 1-2: MODERATE COMPLEXITY   EXAMINATION:   Observation/Orthostatic Postural Assessment:          Pt to PT with straight cane. Pt with decreased knee ext at heel strike and stance. Decreased toe off. Slow gait with small step length. Tight HS and HC. In 90/90, HS are 55. .  Minimal edema. Palpation:        Tender along medial patella and back of R knee  Functional Mobility:         Gait/Ambulation:  Independent with cane         Stairs:  Difficulty with stairs   ROM:     R knee -4 to 120   L knee 0-130                                    Strength:     Knee ext  R 4-/5,  L 4+/5    Knee ext  R 4-/5,  L 4+/5         Hip flexion  R 4/5,  L 4+/5     Hip abd  R 4-/5,  L 4-/5            Special Tests:   Neurological Screen: Intact to light touch   Balance:  Good with cane    Body Structures Involved:  1. Bones  2. Joints  3. Muscles  4. Ligaments Body Functions Affected:  1. Sensory/Pain  2. Neuromusculoskeletal  3. Movement Related Activities and Participation Affected:  1. Learning and Applying Knowledge  2. General Tasks and Demands  3. Mobility  4. Domestic Life  5. Community, Social and [place]   Number of elements (examined above) that affect the Plan of Care: 3: MODERATE COMPLEXITY   CLINICAL PRESENTATION:   Presentation: Evolving clinical presentation with changing clinical characteristics: MODERATE COMPLEXITY   CLINICAL DECISION MAKING:   Outcome Measure: Tool Used: Lower Extremity Functional Scale (LEFS)  Score:  Initial: 43/80 Most Recent: X/80 (Date: -- )   Interpretation of Score: 20 questions each scored on a 5 point scale with 0 representing \"extreme difficulty or unable to perform\" and 4 representing \"no difficulty\".   The lower the score, the greater the functional disability. 80/80 represents no disability. Minimal detectable change is 9 points. Score 80 79-63 62-48 47-32 31-16 15-1 0   Modifier CH CI CJ CK CL CM CN     ? Mobility - Walking and Moving Around:     - CURRENT STATUS: CK - 40%-59% impaired, limited or restricted    - GOAL STATUS: CJ - 20%-39% impaired, limited or restricted    - D/C STATUS:  ---------------To be determined---------------    Medical Necessity:   · Patient is expected to demonstrate progress in strength, range of motion and gait to increase independence with home and community activities . Reason for Services/Other Comments:  · Patient continues to require skilled intervention due to decreased ROM and strength of R knee and LE. Use of outcome tool(s) and clinical judgement create a POC that gives a: Clear prediction of patient's progress: LOW COMPLEXITY            TREATMENT:   (In addition to Assessment/Re-Assessment sessions the following treatments were rendered)  Pre-treatment Symptoms/Complaints:  Much les pain. Some stiffness. Doing more at home. Going to try to go to gym. Pain: Initial:   Pain Intensity 1: 2  Post Session:  1     THERAPEUTIC EXERCISE: (40 minutes):  Exercises per grid below to improve mobility, strength and gait. Required moderate verbal and manual cues to promote proper body alignment, promote proper body posture and promote proper body mechanics. Progressed resistance, range and repetitions as indicated. Instructed in exercise program of QS (quadriceps sets), SAQ's (short arc quadriceps), and LAQ's (long arc quadriceps). HS and HC stretch. Sitting [address] at 25# x 4 sets of 10. Standing cable for hip flex and abd at 17# and hip ext at 25# x 2 sets of 20. Performed 6 in step up and over x 30. Worked on heel toe gait without assistive device. NuStep x 12 min at level 4. Manual Therapy (  20 min ): Patella and patella tendon mobs. Soft tissue work to [employer] and HS.   PROM for knee flex and extn. Sitting knee flexion 130. Full knee ext after stretching. Therapeutic Modalities:    HEP: As above; handouts given to patient for all exercises. Treatment/Session Assessment:  Pt is post R TKA on Jan 24,2018. She has been seen in inpatient rehab and Home PT. Pt is progressing well. Working  aggressively to attain increase ROM to normalize gait Excellent ROM with good heel toe gait. Good increase in strength. Continue to  work to increase strength to enable return to prior activity level including line dancing and water aerobics. Should continue to progress well. · Response to Treatment:  Understood exercises. Good ROM. · Compliance with Program/Exercises: Doing exercises at home. .  · Recommendations/Intent for next treatment session: \"Next visit will focus on ROM and strengtheing of R knee and LE. Gait training. \".   Total Treatment Duration:  PT Patient Time In/Time Out  Time In: 1000  Time Out: 1100  Treatment number  [address], PT

## 2018-03-08 ENCOUNTER — HOSPITAL ENCOUNTER (OUTPATIENT)
Dept: PHYSICAL THERAPY | Age: 83
Discharge: HOME | End: 2018-03-08
Payer: MEDICARE

## 2018-03-08 PROCEDURE — 97140 MANUAL THERAPY 1/> REGIONS: CPT

## 2018-03-08 PROCEDURE — 97110 THERAPEUTIC EXERCISES: CPT

## 2018-03-09 NOTE — PROGRESS NOTES
Serafin Garcia  : 1935  Payor: Tommy Pedro / Plan: [address] HMO / Product Type: Managed Care Medicare /  [address] [place]  at [facility] Therapy  [address], [facility], [address]  [phone]   [phone]         OUTPATIENT PHYSICAL [address] Note 3/8/2018    ICD-10: Treatment Diagnosis:   R26.2 Difficulty in walking, not elsewhere classified      M25.661 Stiffness of right knee, not elsewhere classified      M25.561 Pain in right knee     Precautions/Allergies:   Hydrocodone; Penicillins; and Sulfa (sulfonamide antibiotics)   Fall Risk Score: 1 (? 5 = High Risk)  MD Orders: Eval and treat  MEDICAL/REFERRING DIAGNOSIS:  Presence of right artificial knee joint [Z96.651]   DATE OF ONSET:   REFERRING PHYSICIAN: Azalia Winslow., *  RETURN PHYSICIAN APPOINTMENT: 1 week     INITIAL ASSESSMENT:  Ms. Rock Justice presents with decreased R knee ROM and decreased strength of LE's post R TKA on 18. Pt with fair gait with straight cane. Pt is progressing well. Work to increase ROM to normalize gait. Work aggressively to increase strength to enable return to prior activity level. PROBLEM LIST (Impacting functional limitations):  1. Decreased Strength  2. Decreased ADL/Functional Activities  3. Decreased Ambulation Ability/Technique  4. Increased Pain  5. Decreased Flexibility/Joint Mobility  6. Edema/Girth INTERVENTIONS PLANNED:  1. Gait Training  2. Home Exercise Program (HEP)  3. Manual Therapy  4. Range of Motion (ROM)  5. Therapeutic Activites  6. Therapeutic Exercise/Strengthening   TREATMENT PLAN:  Effective Dates: 18 TO 18. Frequency/Duration: 2 times a week for 60 days and upon reassessment,  will adjust frequency and duration as progress indicates. GOALS: (Goals have been discussed and agreed upon with patient.)  Short-Term Functional Goals: Time Frame: 4 weeks   1.  Establish independent HEP with no cueing to increase ROM and strength. 2. Increase R knee ROM to 0-130 to normalize gait   3. Independent gait without assistive device in home and community. 4. Increase LE strength so able to initiate reciprocal pattern on stairs. 5. Ambulate 15 min with minimal to no increase in R knee pain    Discharge Goals: Time Frame: 8 weeks   1. Improve score on LEFS by 9 points to enable prolonged sitting, standing and ambulation   2. Increase LE strength 1/2 to 1 grade to enable reciprocal pattern on stairs  3. Able to ambulate 30 minutes with minimal to no increase in R knee pain   4. Increase R knee strength and decrease R knee pain 2 levels to enable return to water aerobics. Rehabilitation Potential For Stated Goals: Good  Regarding Marc White's therapy, I certify that the treatment plan above will be carried out by a therapist or under their direction. Thank you for this referral,  Harvey Bloom, PT     Referring Physician Signature: Melanie Hicks., *              Date                    The information in this section was collected on 2/26/18 (except where otherwise noted). HISTORY:   History of Present Injury/Illness (Reason for Referral):  Pt is post R TKA on Jan 24,2018. She went to [facility] for rehab for 11 days. She then had 3 weeks of Home PT until 2/22/18. She is referred to outpatient PT for ROM and strengthening of R knee and LE. Work to normalize gait. Work to increase R knee ROM to normalize gait and to increase strength to enable return to prior activity level. Past Medical History/Comorbidities:   Ms. 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 She also has no past medical history of Aneurysm ([address] Arrhythmia; Asthma; Autoimmune disease ([address] CAD (coronary artery disease); Cancer ([address] Chronic kidney disease; Chronic obstructive pulmonary disease ([address]  Chronic pain; Coagulation disorder ([facility] Diabetes ([facility] Difficult intubation; Endocarditis; GERD (gastroesophageal reflux disease); Heart failure ([facility] Liver disease; Malignant hyperthermia due to anesthesia; Morbid obesity ([facility] Nausea & vomiting; Nicotine vapor product user; Non-nicotine vapor product user; Pseudocholinesterase deficiency; PUD (peptic ulcer disease); Rheumatic fever; Seizures ([facility] Sleep apnea; Stroke [facility]); Thromboembolus ([facility] or Thyroid disease. Ms. Grisel Castillo  has a past surgical history that includes hx knee arthroscopy (Right) and hx colonoscopy. Social History/Living Environment:     Lives with spouse in 1 story home. Stairs into home   Prior Level of Function/Work/Activity:  Retired. Return to water aerobics, line dancing  Dominant Side:         RIGHT  Current Medications:       Current Outpatient Prescriptions:     diphenhydrAMINE-acetaminophen (TYLENOL PM EXTRA STRENGTH)  mg tab, Take 1-2 Tabs by mouth every eight (8) hours. , Disp: , Rfl:     HYDROmorphone (DILAUDID) 2 mg tablet, Take 1 Tab by mouth every four (4) hours as needed. Max Daily Amount: 12 mg., Disp: 40 Tab, Rfl: 0    multivitamin (ONE A DAY) tablet, Take 1 Tab by mouth daily. , Disp: , Rfl:     calcium-cholecalciferol, d3, (CALCIUM 600 + D) 600-125 mg-unit tab, Take  by mouth., Disp: , Rfl:     metoprolol succinate (TOPROL-XL) 25 mg XL tablet, Take 25 mg by mouth nightly. Per anesthesia protocol:instructed to take am of surgery. Indications: hypertension, Disp: , Rfl:     temazepam (RESTORIL) 15 mg capsule, TAKE ONE CAPSULE BY MOUTH AT BEDTIME,as needed, Disp: , Rfl: 0    losartan-hydroCHLOROthiazide (HYZAAR) 100-25 mg per tablet, Take 1 Tab by mouth daily. , Disp: 90 Tab, Rfl: 3    amLODIPine (NORVASC) 2.5 mg tablet, Take 1 Tab by mouth daily. (Patient taking differently: Take 2.5 mg by mouth daily.  Per anesthesia protocol:instructed to take am of surgery.), Disp: 30 Tab, Rfl: 11    citalopram (CELEXA) 10 mg tablet, Take 10 mg by mouth daily. Per anesthesia protocol:instructed to take am of surgery. Indications: ANXIETY WITH DEPRESSION, Disp: , Rfl:    Tylenol for pain   Date Last Reviewed:  3/8/2018   Number of Personal Factors/Comorbidities that affect the Plan of Care: 1-2: MODERATE COMPLEXITY   EXAMINATION:   Observation/Orthostatic Postural Assessment:          Pt to PT with straight cane. Pt with decreased knee ext at heel strike and stance. Decreased toe off. Slow gait with small step length. Tight HS and HC. In 90/90, HS are 55. .  Minimal edema. Palpation:        Tender along medial patella and back of R knee  Functional Mobility:         Gait/Ambulation:  Independent with cane         Stairs:  Difficulty with stairs   ROM:     R knee -4 to 120   L knee 0-130                                    Strength:     Knee ext  R 4-/5,  L 4+/5    Knee ext  R 4-/5,  L 4+/5         Hip flexion  R 4/5,  L 4+/5     Hip abd  R 4-/5,  L 4-/5            Special Tests:   Neurological Screen: Intact to light touch   Balance:  Good with cane    Body Structures Involved:  1. Bones  2. Joints  3. Muscles  4. Ligaments Body Functions Affected:  1. Sensory/Pain  2. Neuromusculoskeletal  3. Movement Related Activities and Participation Affected:  1. Learning and Applying Knowledge  2. General Tasks and Demands  3. Mobility  4. Domestic Life  5. Community, Social and [place]   Number of elements (examined above) that affect the Plan of Care: 3: MODERATE COMPLEXITY   CLINICAL PRESENTATION:   Presentation: Evolving clinical presentation with changing clinical characteristics: MODERATE COMPLEXITY   CLINICAL DECISION MAKING:   Outcome Measure: Tool Used: Lower Extremity Functional Scale (LEFS)  Score:  Initial: 43/80 Most Recent: X/80 (Date: -- )   Interpretation of Score: 20 questions each scored on a 5 point scale with 0 representing \"extreme difficulty or unable to perform\" and 4 representing \"no difficulty\".   The lower the score, the greater the functional disability. 80/80 represents no disability. Minimal detectable change is 9 points. Score 80 79-63 62-48 47-32 31-16 15-1 0   Modifier CH CI CJ CK CL CM CN     ? Mobility - Walking and Moving Around:     - CURRENT STATUS: CK - 40%-59% impaired, limited or restricted    - GOAL STATUS: CJ - 20%-39% impaired, limited or restricted    - D/C STATUS:  ---------------To be determined---------------    Medical Necessity:   · Patient is expected to demonstrate progress in strength, range of motion and gait to increase independence with home and community activities . Reason for Services/Other Comments:  · Patient continues to require skilled intervention due to decreased ROM and strength of R knee and LE. Use of outcome tool(s) and clinical judgement create a POC that gives a: Clear prediction of patient's progress: LOW COMPLEXITY            TREATMENT:   (In addition to Assessment/Re-Assessment sessions the following treatments were rendered)  Pre-treatment Symptoms/Complaints:  Pain and stiffness of R knee. Weakness of both LE's. Fair gait   Pain: Initial:   Pain Intensity 1: 2  Post Session:  1     THERAPEUTIC EXERCISE: (40 minutes):  Exercises per grid below to improve mobility, strength and gait. Required moderate verbal and manual cues to promote proper body alignment, promote proper body posture and promote proper body mechanics. Progressed resistance, range and repetitions as indicated. Instructed in exercise program of QS (quadriceps sets), SAQ's (short arc quadriceps), and LAQ's (long arc quadriceps). HS and HC stretch. Sitting [address] at 25# x 4 sets of 10. Standing cable for hip flex and abd Instructed in 6 in step up. To use ball for active assist knee flexion. NuStep x 8 min at level 3. Manual Therapy (  20 min ): Patella and patella tendon mobs. Soft tissue work to [employer] and HS. PROM for knee flex and extn. Sitting knee flexion 130.   Full knee ext after stretching. Therapeutic Modalities:    HEP: As above; handouts given to patient for all exercises. Treatment/Session Assessment:  Pt is post R TKA on Jan 24,2018. She has been seen in inpatient rehab and Home PT. Pt is progressing well. Will work aggressively to attain increase ROM to normalize gait . Will work to increase strength to enable return to prior activity level including line dancing and water aerobics. Should continue to progress well. · Response to Treatment:  Understood exercises. .  · Compliance with Program/Exercises: Doing exercises  · Recommendations/Intent for next treatment session: \"Next visit will focus on ROM and strengtheing of R knee and LE. Gait training. \".   Total Treatment Duration:  PT Patient Time In/Time Out  Time In: 1000  Time Out: 1100  Treatment number  [address], PT

## 2018-03-13 ENCOUNTER — APPOINTMENT (OUTPATIENT)
Dept: PHYSICAL THERAPY | Age: 83
End: 2018-03-13
Payer: MEDICARE

## 2018-03-15 ENCOUNTER — HOSPITAL ENCOUNTER (OUTPATIENT)
Dept: PHYSICAL THERAPY | Age: 83
Discharge: HOME | End: 2018-03-15
Payer: MEDICARE

## 2018-03-15 PROCEDURE — G8979 MOBILITY GOAL STATUS: HCPCS

## 2018-03-15 PROCEDURE — 97110 THERAPEUTIC EXERCISES: CPT

## 2018-03-15 PROCEDURE — 97140 MANUAL THERAPY 1/> REGIONS: CPT

## 2018-03-15 PROCEDURE — G8980 MOBILITY D/C STATUS: HCPCS

## 2018-03-15 NOTE — PROGRESS NOTES
Olman Neither  : 1935  Payor: Jesus Foot / Plan: [address] HMO / Product Type: Managed Care Medicare /  [address]  at [facility] Therapy  [address], [facility], [address]  [phone]   [phone]         OUTPATIENT PHYSICAL [address] Note and Discharge 3/15/2018    ICD-10: Treatment Diagnosis:   R26.2 Difficulty in walking, not elsewhere classified      M25.661 Stiffness of right knee, not elsewhere classified      M25.561 Pain in right knee     Precautions/Allergies:   Hydrocodone; Penicillins; and Sulfa (sulfonamide antibiotics)   Fall Risk Score: 1 (? 5 = High Risk)  MD Orders: Eval and treat  MEDICAL/REFERRING DIAGNOSIS:  Presence of right artificial knee joint [Z96.651]   DATE OF ONSET:   REFERRING PHYSICIAN: Kelly Ríos., *  RETURN PHYSICIAN APPOINTMENT: 1 week     INITIAL ASSESSMENT:  Ms. Liz Valdes presents with decreased R knee ROM and decreased strength of LE's post R TKA on 18. Pt with fair gait with straight cane. Pt is progressing well. Work to increase ROM to normalize gait. Work aggressively to increase strength to enable return to prior activity level. PROBLEM LIST (Impacting functional limitations):  1. Decreased Strength  2. Decreased ADL/Functional Activities  3. Decreased Ambulation Ability/Technique  4. Increased Pain  5. Decreased Flexibility/Joint Mobility  6. Edema/Girth INTERVENTIONS PLANNED:  1. Gait Training  2. Home Exercise Program (HEP)  3. Manual Therapy  4. Range of Motion (ROM)  5. Therapeutic Activites  6. Therapeutic Exercise/Strengthening   TREATMENT PLAN:  Effective Dates: 18 TO 18. Frequency/Duration: 2 times a week for 60 days and upon reassessment,  will adjust frequency and duration as progress indicates. GOALS: (Goals have been discussed and agreed upon with patient.)  Short-Term Functional Goals: Time Frame: 4 weeks   1.  Establish independent HEP with no cueing to increase ROM and strength. 2. Increase R knee ROM to 0-130 to normalize gait   3. Independent gait without assistive device in home and community. 4. Increase LE strength so able to initiate reciprocal pattern on stairs. 5. Ambulate 15 min with minimal to no increase in R knee pain    Discharge Goals: Time Frame: 8 weeks   1. Improve score on LEFS by 9 points to enable prolonged sitting, standing and ambulation   2. Increase LE strength 1/2 to 1 grade to enable reciprocal pattern on stairs  3. Able to ambulate 30 minutes with minimal to no increase in R knee pain   4. Increase R knee strength and decrease R knee pain 2 levels to enable return to water aerobics. Rehabilitation Potential For Stated Goals: Good  Regarding Christian White's therapy, I certify that the treatment plan above will be carried out by a therapist or under their direction. Thank you for this referral,  Bart Swain, PT     Referring Physician Signature: Marty Luna., *              Date                    The information in this section was collected on 2/26/18 (except where otherwise noted). HISTORY:   History of Present Injury/Illness (Reason for Referral):  Pt is post R TKA on Jan 24,2018. She went to [facility] for rehab for 11 days. She then had 3 weeks of Home PT until 2/22/18. She is referred to outpatient PT for ROM and strengthening of R knee and LE. Work to normalize gait. Work to increase R knee ROM to normalize gait and to increase strength to enable return to prior activity level. Past Medical History/Comorbidities:   Ms. 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 She also has no past medical history of Aneurysm ([address] Arrhythmia; Asthma; Autoimmune disease ([address] CAD (coronary artery disease); Cancer ([address] Chronic kidney disease; Chronic obstructive pulmonary disease ([address]  Chronic pain; Coagulation disorder ([facility] Diabetes ([facility] Difficult intubation; Endocarditis; GERD (gastroesophageal reflux disease); Heart failure ([facility] Liver disease; Malignant hyperthermia due to anesthesia; Morbid obesity ([facility] Nausea & vomiting; Nicotine vapor product user; Non-nicotine vapor product user; Pseudocholinesterase deficiency; PUD (peptic ulcer disease); Rheumatic fever; Seizures ([facility] Sleep apnea; Stroke [facility]); Thromboembolus ([facility] or Thyroid disease. Ms. Emigdio Barbosa  has a past surgical history that includes hx knee arthroscopy (Right) and hx colonoscopy. Social History/Living Environment:     Lives with spouse in 1 story home. Stairs into home   Prior Level of Function/Work/Activity:  Retired. Return to water aerobics, line dancing  Dominant Side:         RIGHT  Current Medications:       Current Outpatient Prescriptions:     diphenhydrAMINE-acetaminophen (TYLENOL PM EXTRA STRENGTH)  mg tab, Take 1-2 Tabs by mouth every eight (8) hours. , Disp: , Rfl:     HYDROmorphone (DILAUDID) 2 mg tablet, Take 1 Tab by mouth every four (4) hours as needed. Max Daily Amount: 12 mg., Disp: 40 Tab, Rfl: 0    multivitamin (ONE A DAY) tablet, Take 1 Tab by mouth daily. , Disp: , Rfl:     calcium-cholecalciferol, d3, (CALCIUM 600 + D) 600-125 mg-unit tab, Take  by mouth., Disp: , Rfl:     metoprolol succinate (TOPROL-XL) 25 mg XL tablet, Take 25 mg by mouth nightly. Per anesthesia protocol:instructed to take am of surgery. Indications: hypertension, Disp: , Rfl:     temazepam (RESTORIL) 15 mg capsule, TAKE ONE CAPSULE BY MOUTH AT BEDTIME,as needed, Disp: , Rfl: 0    losartan-hydroCHLOROthiazide (HYZAAR) 100-25 mg per tablet, Take 1 Tab by mouth daily. , Disp: 90 Tab, Rfl: 3    amLODIPine (NORVASC) 2.5 mg tablet, Take 1 Tab by mouth daily. (Patient taking differently: Take 2.5 mg by mouth daily.  Per anesthesia protocol:instructed to take am of surgery.), Disp: 30 Tab, Rfl: 11    citalopram (CELEXA) 10 mg tablet, Take 10 mg by mouth daily. Per anesthesia protocol:instructed to take am of surgery. Indications: ANXIETY WITH DEPRESSION, Disp: , Rfl:    Tylenol for pain   Date Last Reviewed:  3/15/2018   Number of Personal Factors/Comorbidities that affect the Plan of Care: 1-2: MODERATE COMPLEXITY   EXAMINATION:   Observation/Orthostatic Postural Assessment:          Pt to PT with straight cane. Pt with decreased knee ext at heel strike and stance. Decreased toe off. Slow gait with small step length. Tight HS and HC. In 90/90, HS are 55. .  Minimal edema. Palpation:        Tender along medial patella and back of R knee  Functional Mobility:         Gait/Ambulation:  Independent with cane         Stairs:  Difficulty with stairs   ROM:     R knee -4 to 120   L knee 0-130                                    Strength:     Knee ext  R 4-/5,  L 4+/5    Knee ext  R 4-/5,  L 4+/5         Hip flexion  R 4/5,  L 4+/5     Hip abd  R 4-/5,  L 4-/5            Special Tests:   Neurological Screen: Intact to light touch   Balance:  Good with cane    Body Structures Involved:  1. Bones  2. Joints  3. Muscles  4. Ligaments Body Functions Affected:  1. Sensory/Pain  2. Neuromusculoskeletal  3. Movement Related Activities and Participation Affected:  1. Learning and Applying Knowledge  2. General Tasks and Demands  3. Mobility  4. Domestic Life  5. Community, Social and [place]   Number of elements (examined above) that affect the Plan of Care: 3: MODERATE COMPLEXITY   CLINICAL PRESENTATION:   Presentation: Evolving clinical presentation with changing clinical characteristics: MODERATE COMPLEXITY   CLINICAL DECISION MAKING:   Outcome Measure: Tool Used: Lower Extremity Functional Scale (LEFS)  Score:  Initial: 43/80 Most Recent: 60/80 (Date: 3/15/18 )   Interpretation of Score: 20 questions each scored on a 5 point scale with 0 representing \"extreme difficulty or unable to perform\" and 4 representing \"no difficulty\".   The lower the score, the greater the functional disability. 80/80 represents no disability. Minimal detectable change is 9 points. Score 80 79-63 62-48 47-32 31-16 15-1 0   Modifier CH CI CJ CK CL CM CN     ? Mobility - Walking and Moving Around:     - CURRENT STATUS: CJ - 20%-39% impaired, limited or restricted    - GOAL STATUS: CJ - 20%-39% impaired, limited or restricted    - D/C STATUS:  CJ - 20%-39% impaired, limited or restricted    Medical Necessity:   · Patient is expected to demonstrate progress in strength, range of motion and gait to increase independence with home and community activities . Reason for Services/Other Comments:  · Patient continues to require skilled intervention due to decreased ROM and strength of R knee and LE. Use of outcome tool(s) and clinical judgement create a POC that gives a: Clear prediction of patient's progress: LOW COMPLEXITY            TREATMENT:   (In addition to Assessment/Re-Assessment sessions the following treatments were rendered)  Pre-treatment Symptoms/Complaints:  Pain and stiffness of R knee. Weakness of both LE's. Fair gait   Pain: Initial:   Pain Intensity 1: 2  Post Session:  1     THERAPEUTIC EXERCISE: (40 minutes):  Exercises per grid below to improve mobility, strength and gait. Required moderate verbal and manual cues to promote proper body alignment, promote proper body posture and promote proper body mechanics. Progressed resistance, range and repetitions as indicated. Instructed in exercise program of QS (quadriceps sets), SAQ's (short arc quadriceps), and LAQ's (long arc quadriceps). HS and HC stretch. Sitting [address] at 25# x 4 sets of 10. Standing cable for hip flex and abd Instructed in 6 in step up. To use ball for active assist knee flexion. NuStep x 8 min at level 3. Manual Therapy (  20 min ): Patella and patella tendon mobs. Soft tissue work to [employer] and HS. PROM for knee flex and extn. Sitting knee flexion 130.   Full knee ext after stretching. Therapeutic Modalities:    HEP: As above; handouts given to patient for all exercises. Treatment/Session Assessment:  Pt is post R TKA on Jan 24,2018. She has been seen in inpatient rehab and Home PT. Pt is progressing well. Will work aggressively to attain increase ROM to normalize gait . Will work to increase strength to enable return to prior activity level including line dancing and water aerobics. Should continue to progress well. · Response to Treatment:  Understood exercises. .  · Compliance with Program/Exercises: Doing exercises  · Recommendations/Intent for next treatment session: \"Next visit will focus on ROM and strengtheing of R knee and LE. Gait training. \".   Total Treatment Duration:  PT Patient Time In/Time Out  Time In: 1000  Time Out: 1100  Treatment number  [address], PT

## 2018-03-20 ENCOUNTER — HOSPITAL ENCOUNTER (OUTPATIENT)
Dept: PHYSICAL THERAPY | Age: 83
Discharge: HOME | End: 2018-03-20
Payer: MEDICARE

## 2018-03-21 NOTE — PROGRESS NOTES
Vann Schilder  : 1935  Primary: Carmen [employer]que[employer] Medicare Hmo  Secondary:  Therapy Center at [address]  [phone]   [phone]        OUTPATIENT DAILY NOTE    NAME/AGE/GENDER: Vann Schilder is a [de-identified] y.o. female. DATE: 3/20/2018    Patient did not come for appointment today due to  Error in schedule. Will plan to follow up on next scheduled visit on Thursday with probable DC.     [employer], PT

## 2018-03-27 ENCOUNTER — APPOINTMENT (OUTPATIENT)
Dept: PHYSICAL THERAPY | Age: 83
End: 2018-03-27
Payer: MEDICARE

## 2018-03-29 ENCOUNTER — APPOINTMENT (OUTPATIENT)
Dept: PHYSICAL THERAPY | Age: 83
End: 2018-03-29
Payer: MEDICARE

## 2018-04-03 ENCOUNTER — APPOINTMENT (OUTPATIENT)
Dept: PHYSICAL THERAPY | Age: 83
End: 2018-04-03

## 2018-04-05 ENCOUNTER — APPOINTMENT (OUTPATIENT)
Dept: PHYSICAL THERAPY | Age: 83
End: 2018-04-05

## 2018-04-10 ENCOUNTER — APPOINTMENT (OUTPATIENT)
Dept: PHYSICAL THERAPY | Age: 83
End: 2018-04-10

## 2018-04-12 ENCOUNTER — APPOINTMENT (OUTPATIENT)
Dept: PHYSICAL THERAPY | Age: 83
End: 2018-04-12

## 2018-04-17 ENCOUNTER — APPOINTMENT (OUTPATIENT)
Dept: PHYSICAL THERAPY | Age: 83
End: 2018-04-17

## 2018-04-19 ENCOUNTER — APPOINTMENT (OUTPATIENT)
Dept: PHYSICAL THERAPY | Age: 83
End: 2018-04-19

## 2019-01-30 ENCOUNTER — HOSPITAL ENCOUNTER (OUTPATIENT)
Dept: SURGERY | Age: 84
Discharge: HOME | End: 2019-01-30
Payer: MEDICARE

## 2019-01-30 VITALS
DIASTOLIC BLOOD PRESSURE: 75 MMHG | HEART RATE: 50 BPM | OXYGEN SATURATION: 96 % | SYSTOLIC BLOOD PRESSURE: 142 MMHG | BODY MASS INDEX: 22.71 KG/M2 | HEIGHT: 64 IN | TEMPERATURE: 96.5 F | WEIGHT: 133 LBS

## 2019-01-30 LAB
ANION GAP SERPL CALC-SCNC: 8 MMOL/L
APPEARANCE UR: CLEAR
APTT PPP: 25.6 SEC (ref 24.7–39.8)
BACTERIA SPEC CULT: NORMAL
BACTERIA URNS QL MICRO: 0 /HPF
BASOPHILS # BLD: 0 K/UL (ref 0–0.2)
BASOPHILS NFR BLD: 0 % (ref 0–2)
BILIRUB UR QL: NEGATIVE
BUN SERPL-MCNC: 18 MG/DL (ref 8–23)
CALCIUM SERPL-MCNC: 9.3 MG/DL (ref 8.3–10.4)
CASTS URNS QL MICRO: 0 /LPF
CHLORIDE SERPL-SCNC: 98 MMOL/L (ref 98–107)
CO2 SERPL-SCNC: 33 MMOL/L (ref 21–32)
COLOR UR: YELLOW
CREAT SERPL-MCNC: 0.73 MG/DL (ref 0.6–1)
DIFFERENTIAL METHOD BLD: ABNORMAL
EOSINOPHIL # BLD: 0.2 K/UL (ref 0–0.8)
EOSINOPHIL NFR BLD: 3 % (ref 0.5–7.8)
EPI CELLS #/AREA URNS HPF: ABNORMAL /HPF
ERYTHROCYTE [DISTWIDTH] IN BLOOD BY AUTOMATED COUNT: 13.2 % (ref 11.9–14.6)
GLUCOSE SERPL-MCNC: 86 MG/DL (ref 65–100)
GLUCOSE UR STRIP.AUTO-MCNC: NEGATIVE MG/DL
HCT VFR BLD AUTO: 36 % (ref 35.8–46.3)
HGB BLD-MCNC: 11.9 G/DL (ref 11.7–15.4)
HGB UR QL STRIP: NEGATIVE
IMM GRANULOCYTES # BLD AUTO: 0 K/UL (ref 0–0.5)
IMM GRANULOCYTES NFR BLD AUTO: 0 % (ref 0–5)
INR PPP: 1
KETONES UR QL STRIP.AUTO: NEGATIVE MG/DL
LEUKOCYTE ESTERASE UR QL STRIP.AUTO: ABNORMAL
LYMPHOCYTES # BLD: 1.9 K/UL (ref 0.5–4.6)
LYMPHOCYTES NFR BLD: 40 % (ref 13–44)
MCH RBC QN AUTO: 30.2 PG (ref 26.1–32.9)
MCHC RBC AUTO-ENTMCNC: 33.1 G/DL (ref 31.4–35)
MCV RBC AUTO: 91.4 FL (ref 79.6–97.8)
MONOCYTES # BLD: 0.3 K/UL (ref 0.1–1.3)
MONOCYTES NFR BLD: 7 % (ref 4–12)
NEUTS SEG # BLD: 2.3 K/UL (ref 1.7–8.2)
NEUTS SEG NFR BLD: 49 % (ref 43–78)
NITRITE UR QL STRIP.AUTO: NEGATIVE
NRBC # BLD: 0 K/UL (ref 0–0.2)
PH UR STRIP: 7.5 [PH] (ref 5–9)
PLATELET # BLD AUTO: 160 K/UL (ref 150–450)
PMV BLD AUTO: 11.8 FL (ref 9.4–12.3)
POTASSIUM SERPL-SCNC: 3.8 MMOL/L (ref 3.5–5.1)
PROT UR STRIP-MCNC: NEGATIVE MG/DL
PROTHROMBIN TIME: 12.9 SEC (ref 11.7–14.5)
RBC # BLD AUTO: 3.94 M/UL (ref 4.05–5.2)
RBC #/AREA URNS HPF: ABNORMAL /HPF
SERVICE CMNT-IMP: NORMAL
SODIUM SERPL-SCNC: 139 MMOL/L (ref 136–145)
SP GR UR REFRACTOMETRY: 1.01 (ref 1–1.02)
UROBILINOGEN UR QL STRIP.AUTO: 0.2 EU/DL (ref 0.2–1)
WBC # BLD AUTO: 4.7 K/UL (ref 4.3–11.1)
WBC URNS QL MICRO: ABNORMAL /HPF

## 2019-01-30 PROCEDURE — 80048 BASIC METABOLIC PNL TOTAL CA: CPT

## 2019-01-30 PROCEDURE — 81001 URINALYSIS AUTO W/SCOPE: CPT

## 2019-01-30 PROCEDURE — 87641 MR-STAPH DNA AMP PROBE: CPT

## 2019-01-30 PROCEDURE — 77030027138 HC INCENT SPIROMETER -A

## 2019-01-30 PROCEDURE — 85730 THROMBOPLASTIN TIME PARTIAL: CPT

## 2019-01-30 PROCEDURE — 85025 COMPLETE CBC W/AUTO DIFF WBC: CPT

## 2019-01-30 PROCEDURE — 85610 PROTHROMBIN TIME: CPT

## 2019-01-30 RX ORDER — VITAMIN E 268 MG
400 CAPSULE ORAL DAILY
COMMUNITY
End: 2019-02-06

## 2019-01-30 NOTE — PERIOP NOTES
Dr. Macy Lund,     Your patient recently had labs drawn during a hospital appointment due to an upcoming surgery. The results are attached. If you have any questions or concerns please reach out to your patient for a follow-up in your office. Please do not respond to this message as my mailbox is not monitored. You may call [phone] with questions or concerns. Recent Results (from the past 12 hour(s))   CBC WITH AUTOMATED DIFF    Collection Time: 01/30/19  2:14 PM   Result Value Ref Range    WBC 4.7 4.3 - 11.1 K/uL    RBC 3.94 (L) 4.05 - 5.2 M/uL    HGB 11.9 11.7 - 15.4 g/dL    HCT 36.0 35.8 - 46.3 %    MCV 91.4 79.6 - 97.8 FL    MCH 30.2 26.1 - 32.9 PG    MCHC 33.1 31.4 - 35.0 g/dL    RDW 13.2 11.9 - 14.6 %    PLATELET 756 699 - 310 K/uL    MPV 11.8 9.4 - 12.3 FL    ABSOLUTE NRBC 0.00 0.0 - 0.2 K/uL    DF AUTOMATED      NEUTROPHILS 49 43 - 78 %    LYMPHOCYTES 40 13 - 44 %    MONOCYTES 7 4.0 - 12.0 %    EOSINOPHILS 3 0.5 - 7.8 %    BASOPHILS 0 0.0 - 2.0 %    IMMATURE GRANULOCYTES 0 0.0 - 5.0 %    ABS. NEUTROPHILS 2.3 1.7 - 8.2 K/UL    ABS. LYMPHOCYTES 1.9 0.5 - 4.6 K/UL    ABS. MONOCYTES 0.3 0.1 - 1.3 K/UL    ABS. EOSINOPHILS 0.2 0.0 - 0.8 K/UL    ABS. BASOPHILS 0.0 0.0 - 0.2 K/UL    ABS. IMM.  GRANS. 0.0 0.0 - 0.5 K/UL   METABOLIC PANEL, BASIC    Collection Time: 01/30/19  2:14 PM   Result Value Ref Range    Sodium 139 136 - 145 mmol/L    Potassium 3.8 3.5 - 5.1 mmol/L    Chloride 98 98 - 107 mmol/L    CO2 33 (H) 21 - 32 mmol/L    Anion gap 8 mmol/L    Glucose 86 65 - 100 mg/dL    BUN 18 8 - 23 MG/DL    Creatinine 0.73 0.6 - 1.0 MG/DL    GFR est AA >60 >60 ml/min/1.73m2    GFR est non-AA >60 ml/min/1.73m2    Calcium 9.3 8.3 - 10.4 MG/DL   PROTHROMBIN TIME + INR    Collection Time: 01/30/19  2:14 PM   Result Value Ref Range    Prothrombin time 12.9 11.7 - 14.5 sec    INR 1.0     PTT    Collection Time: 01/30/19  2:14 PM   Result Value Ref Range    aPTT 25.6 24.7 - 39.8 SEC   URINALYSIS W/ RFLX MICROSCOPIC Collection Time: 01/30/19  2:14 PM   Result Value Ref Range    Color YELLOW      Appearance CLEAR      Specific gravity 1.008 1.001 - 1.023      pH (UA) 7.5 5.0 - 9.0      Protein NEGATIVE  NEG mg/dL    Glucose NEGATIVE  mg/dL    Ketone NEGATIVE  NEG mg/dL    Bilirubin NEGATIVE  NEG      Blood NEGATIVE  NEG      Urobilinogen 0.2 0.2 - 1.0 EU/dL    Nitrites NEGATIVE  NEG      Leukocyte Esterase TRACE (A) NEG      WBC 0-3 0 /hpf    RBC 0-3 0 /hpf    Epithelial cells 0-3 0 /hpf    Bacteria 0 0 /hpf    Casts 0 0 /lpf

## 2019-01-30 NOTE — PERIOP NOTES
Price Carroll MD    Your patient recently had labs drawn during a hospital appointment due to an upcoming surgery. The results are attached. If you have any questions or concerns please reach out to your patient for a follow-up in your office. Please do not respond to this message as my mailbox is not monitored. You may call [phone] with questions or concerns. Recent Results (from the past 12 hour(s))   CBC WITH AUTOMATED DIFF    Collection Time: 01/30/19  2:14 PM   Result Value Ref Range    WBC 4.7 4.3 - 11.1 K/uL    RBC 3.94 (L) 4.05 - 5.2 M/uL    HGB 11.9 11.7 - 15.4 g/dL    HCT 36.0 35.8 - 46.3 %    MCV 91.4 79.6 - 97.8 FL    MCH 30.2 26.1 - 32.9 PG    MCHC 33.1 31.4 - 35.0 g/dL    RDW 13.2 11.9 - 14.6 %    PLATELET 928 633 - 068 K/uL    MPV 11.8 9.4 - 12.3 FL    ABSOLUTE NRBC 0.00 0.0 - 0.2 K/uL    DF AUTOMATED      NEUTROPHILS 49 43 - 78 %    LYMPHOCYTES 40 13 - 44 %    MONOCYTES 7 4.0 - 12.0 %    EOSINOPHILS 3 0.5 - 7.8 %    BASOPHILS 0 0.0 - 2.0 %    IMMATURE GRANULOCYTES 0 0.0 - 5.0 %    ABS. NEUTROPHILS 2.3 1.7 - 8.2 K/UL    ABS. LYMPHOCYTES 1.9 0.5 - 4.6 K/UL    ABS. MONOCYTES 0.3 0.1 - 1.3 K/UL    ABS. EOSINOPHILS 0.2 0.0 - 0.8 K/UL    ABS. BASOPHILS 0.0 0.0 - 0.2 K/UL    ABS. IMM.  GRANS. 0.0 0.0 - 0.5 K/UL   METABOLIC PANEL, BASIC    Collection Time: 01/30/19  2:14 PM   Result Value Ref Range    Sodium 139 136 - 145 mmol/L    Potassium 3.8 3.5 - 5.1 mmol/L    Chloride 98 98 - 107 mmol/L    CO2 33 (H) 21 - 32 mmol/L    Anion gap 8 mmol/L    Glucose 86 65 - 100 mg/dL    BUN 18 8 - 23 MG/DL    Creatinine 0.73 0.6 - 1.0 MG/DL    GFR est AA >60 >60 ml/min/1.73m2    GFR est non-AA >60 ml/min/1.73m2    Calcium 9.3 8.3 - 10.4 MG/DL   PROTHROMBIN TIME + INR    Collection Time: 01/30/19  2:14 PM   Result Value Ref Range    Prothrombin time 12.9 11.7 - 14.5 sec    INR 1.0     PTT    Collection Time: 01/30/19  2:14 PM   Result Value Ref Range    aPTT 25.6 24.7 - 39.8 SEC   URINALYSIS W/ RFLX MICROSCOPIC    Collection Time: 01/30/19  2:14 PM   Result Value Ref Range    Color YELLOW      Appearance CLEAR      Specific gravity 1.008 1.001 - 1.023      pH (UA) 7.5 5.0 - 9.0      Protein NEGATIVE  NEG mg/dL    Glucose NEGATIVE  mg/dL    Ketone NEGATIVE  NEG mg/dL    Bilirubin NEGATIVE  NEG      Blood NEGATIVE  NEG      Urobilinogen 0.2 0.2 - 1.0 EU/dL    Nitrites NEGATIVE  NEG      Leukocyte Esterase TRACE (A) NEG      WBC 0-3 0 /hpf    RBC 0-3 0 /hpf    Epithelial cells 0-3 0 /hpf    Bacteria 0 0 /hpf    Casts 0 0 /lpf

## 2019-01-30 NOTE — ADVANCED PRACTICE NURSE
Total Joint Surgery Preoperative Chart Review      Patient ID:  Arianna Carrasco  [id number]  82 y.o.  1935  Surgeon: Dr. Irma Maciel  Date of Surgery: 2/4/2019  Procedure: Total Left Knee Arthroplasty  Primary Care Physician: Millicent Mendiola [phone]  Specialty Physician(s):      Subjective:   Arianna Carrasco is a 82 y.o. WHITE OR  female who presents for preoperative evaluation for Total Left Knee arthroplasty. This is a preoperative chart review note based on data collected by the nurse at the surgical Pre-Assessment visit. Past Medical History:   Diagnosis Date    Arthritis     osteo    Hypertension     on med for control     Insomnia     Murmur     Murmur (Soft VIOLETTA RUSB, Soft TR murmur) heard per cardiology office note dated 05/22/18 (Dr. Zuri Layne)    Psychiatric disorder     anxiety- citalopram daily    Status post total right knee replacement 1/24/2018      Past Surgical History:   Procedure Laterality Date    HX CATARACT REMOVAL Bilateral 2018    HX COLONOSCOPY      HX KNEE ARTHROSCOPY Right     HX KNEE REPLACEMENT Right 01/24/2018     Family History   Problem Relation Age of Onset    Cancer Mother     Heart Disease Father       Social History     Tobacco Use    Smoking status: Never Smoker    Smokeless tobacco: Never Used   Substance Use Topics    Alcohol use: Yes     Alcohol/week: 1.2 oz     Types: 2 Glasses of wine per week       Prior to Admission medications    Medication Sig Start Date End Date Taking? Authorizing Provider   vitamin E (AQUA GEMS) 400 unit capsule Take 400 Units by mouth daily. Yes Provider, Historical   omega 3-dha-epa-fish oil (FISH OIL) 100-160-1,000 mg cap Take 1 Cap by mouth Three (3) times a week. Yes Provider, Historical   ZINC ACETATE PO Take 1 Tab by mouth daily. Yes Provider, Historical   aspirin delayed-release 81 mg tablet Take 81 mg by mouth nightly.    Yes Provider, Historical   metoprolol succinate (TOPROL-XL) 25 mg XL tablet Take 1 Tab by mouth nightly. Indications: hypertension 5/22/18  Yes Channing Schroeder MD   amLODIPine (NORVASC) 2.5 mg tablet Take 1 Tab by mouth daily. Patient taking differently: Take 2.5 mg by mouth nightly. 5/22/18  Yes Channing Schroeder MD   losartan-hydroCHLOROthiazide [facility]) 100-25 mg per tablet Take 1 Tab by mouth daily. 5/22/18  Yes Channing Schroeder MD   multivitamin (ONE A DAY) tablet Take 1 Tab by mouth daily. Yes Provider, Historical   calcium-cholecalciferol, d3, (CALCIUM 600 + D) 600-125 mg-unit tab Take 1 Tab by mouth daily. Yes Provider, Historical   temazepam (RESTORIL) 15 mg capsule TAKE ONE CAPSULE BY MOUTH AT BEDTIME,as needed 10/31/17  Yes Provider, Historical   citalopram (CELEXA) 10 mg tablet Take 10 mg by mouth daily. Per anesthesia protocol:instructed to take am of surgery. Indications: ANXIETY WITH DEPRESSION   Yes Provider, Historical     Allergies   Allergen Reactions    Hydrocodone Unknown (comments)     \"too strong\"    Penicillins Itching    Sulfa (Sulfonamide Antibiotics) Itching          Objective:     Physical Exam:   Patient Vitals for the past 24 hrs:   Temp Pulse BP SpO2   01/30/19 1309 96.5 °F (35.8 °C) (!) 50 142/75 96 %       ECG:    EKG Results     None          Data Review:   Labs:   Recent Results (from the past 24 hour(s))   CBC WITH AUTOMATED DIFF    Collection Time: 01/30/19  2:14 PM   Result Value Ref Range    WBC 4.7 4.3 - 11.1 K/uL    RBC 3.94 (L) 4.05 - 5.2 M/uL    HGB 11.9 11.7 - 15.4 g/dL    HCT 36.0 35.8 - 46.3 %    MCV 91.4 79.6 - 97.8 FL    MCH 30.2 26.1 - 32.9 PG    MCHC 33.1 31.4 - 35.0 g/dL    RDW 13.2 11.9 - 14.6 %    PLATELET 713 273 - 918 K/uL    MPV 11.8 9.4 - 12.3 FL    ABSOLUTE NRBC 0.00 0.0 - 0.2 K/uL    DF AUTOMATED      NEUTROPHILS 49 43 - 78 %    LYMPHOCYTES 40 13 - 44 %    MONOCYTES 7 4.0 - 12.0 %    EOSINOPHILS 3 0.5 - 7.8 %    BASOPHILS 0 0.0 - 2.0 %    IMMATURE GRANULOCYTES 0 0.0 - 5.0 %    ABS.  NEUTROPHILS 2. 3 1.7 - 8.2 K/UL    ABS. LYMPHOCYTES 1.9 0.5 - 4.6 K/UL    ABS. MONOCYTES 0.3 0.1 - 1.3 K/UL    ABS. EOSINOPHILS 0.2 0.0 - 0.8 K/UL    ABS. BASOPHILS 0.0 0.0 - 0.2 K/UL    ABS. IMM. GRANS. 0.0 0.0 - 0.5 K/UL   PROTHROMBIN TIME + INR    Collection Time: 01/30/19  2:14 PM   Result Value Ref Range    Prothrombin time 12.9 11.7 - 14.5 sec    INR 1.0     PTT    Collection Time: 01/30/19  2:14 PM   Result Value Ref Range    aPTT 25.6 24.7 - 39.8 SEC   URINALYSIS W/ RFLX MICROSCOPIC    Collection Time: 01/30/19  2:14 PM   Result Value Ref Range    Color YELLOW      Appearance CLEAR      Specific gravity 1.008 1.001 - 1.023      pH (UA) 7.5 5.0 - 9.0      Protein NEGATIVE  NEG mg/dL    Glucose NEGATIVE  mg/dL    Ketone NEGATIVE  NEG mg/dL    Bilirubin NEGATIVE  NEG      Blood NEGATIVE  NEG      Urobilinogen 0.2 0.2 - 1.0 EU/dL    Nitrites NEGATIVE  NEG      Leukocyte Esterase TRACE (A) NEG      WBC 0-3 0 /hpf    RBC 0-3 0 /hpf    Epithelial cells 0-3 0 /hpf    Bacteria 0 0 /hpf    Casts 0 0 /lpf         Problem List:  )  Patient Active Problem List   Diagnosis Code    Malaise and fatigue R53.81, R53.83    Diaphoresis/hyperhidrosis R61    Abnormal EKG R94.31    Bradycardia R00.1    Elevated blood pressure reading R03.0    Preop cardiovascular exam Z01.810    Status post total right knee replacement Z96.651       Total Joint Surgery Pre-Assessment Recommendations:        He/she is a moderate risk for sleep apnea but is not interested in additional work up at this time. Will initiate perioperative ILENE precautions. Recommend continuous saturation monitoring hours of sleep, during hospitalization.                Signed By: SANAM Ndiaye    January 30, 2019

## 2019-01-30 NOTE — PERIOP NOTES
Patient verified name and . Order for consent found in EHR and matches case posting; patient verified. Type 3 surgery, walk in assessment complete. Labs per surgeon: CBC, BMP, U/A, PT/PTT, MRSA nasal swab ; results pending. Labs per anesthesia protocol: included in surgeons orders. EKG: last done 18 at [facility] cardiology. Result within anesthesia protocols and on chart for reference. ECHO dated 17 printed and on chart for reference. [facility] Cardiology office note dated 18 available in EHR for reference if needed. Incentive spirometer given. Instructed to bring dos and to start using 10 times twice a day between now and surgery. Patient verbalized and demonstrated understanding. MRSA/MSSA swab collected; pharmacy to review and dose antibiotic as appropriate. Hibiclens and instructions to return bottle on DOS given per hospital policy. Patient provided with handouts including Guide to Surgery, Pain Management, Hand Hygiene, Blood Transfusion Education, and [facility] Brochure. Patient answered medical/surgical history questions at their best of ability. All prior to admission medications documented in [facility]. Original medication prescription bottles visualized during patient appointment. Patient instructed to hold all vitamins 7 days prior to surgery and NSAIDS 5 days prior to surgery. Prescription medications to hold: none. Patient instructed to continue previous medications as prescribed prior to surgery and to take the following medications the day of surgery according to anesthesia guidelines with a small sip of water: citalopram.      Patient teach back successful and patient demonstrates knowledge of instruction.

## 2019-01-31 NOTE — H&P
[id number] [facility]  Pre Operative History and Physical Exam    Patient ID:  Cheyenne June  [id number]  82 y.o.  1935    Today: January 31, 2019       Assessment:   1. Arthritis of the left knee        Plan:    1. Proceed with scheduled Procedure(s) (LRB):  LEFT KNEE ARTHROPLASTY TOTAL / CEFERINO / FNB (Left)            CC: Left knee pain    HPI:   The patient has end stage arthritis of the left knee. The patient was evaluated and examined during a consultation prior to this office visit. There have been no changes to the patient's orthopedic condition since the initial consultation. The patient has failed previous conservative treatment for this condition including antiinflammatories , and lifestyle modifications. The necessity for joint replacement is present. The patient will be admitted the day of surgery for Procedure(s) (LRB):  LEFT KNEE ARTHROPLASTY TOTAL / Jerrlyn Latus / FNB (Left)      Past Medical/Surgical History:  Past Medical History:   Diagnosis Date    Arthritis     osteo    Hypertension     on med for control     Insomnia     Murmur     Murmur (Soft VIOLETTA RUSB, Soft TR murmur) heard per cardiology office note dated 05/22/18 (Dr. 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     Past Surgical History:   Procedure Laterality Date    HX CATARACT REMOVAL Bilateral 2018    HX COLONOSCOPY      HX KNEE ARTHROSCOPY Right     HX KNEE REPLACEMENT Right 01/24/2018        Allergies:    Allergies   Allergen Reactions    Hydrocodone Unknown (comments)     \"too strong\"    Penicillins Itching    Sulfa (Sulfonamide Antibiotics) Itching        Physical Exam:   General: NAD, Alert, Oriented, Appears their stated age     [de-identified]: NC/AT, PERRL    Skin: No rashes, lesions or wounds seen      Psych: normal affect      Heart: Regular Rate, Rhythm     Lungs: unlabored respirations, normal breath sounds     Abdomen: Soft and non-distended     Ortho: Pain with limited ROM of the left knee    Neuro: no focal defects, sensation is equal bilaterally     Lymph: no lymphadenopathy     Meds:   No current facility-administered medications for this encounter. Current Outpatient Medications   Medication Sig    vitamin E (AQUA GEMS) 400 unit capsule Take 400 Units by mouth daily.  omega 3-dha-epa-fish oil (FISH OIL) 100-160-1,000 mg cap Take 1 Cap by mouth Three (3) times a week.  ZINC ACETATE PO Take 1 Tab by mouth daily.  aspirin delayed-release 81 mg tablet Take 81 mg by mouth nightly.  metoprolol succinate (TOPROL-XL) 25 mg XL tablet Take 1 Tab by mouth nightly. Indications: hypertension    amLODIPine (NORVASC) 2.5 mg tablet Take 1 Tab by mouth daily. (Patient taking differently: Take 2.5 mg by mouth nightly.)    losartan-hydroCHLOROthiazide (HYZAAR) 100-25 mg per tablet Take 1 Tab by mouth daily.  multivitamin (ONE A DAY) tablet Take 1 Tab by mouth daily.  calcium-cholecalciferol, d3, (CALCIUM 600 + D) 600-125 mg-unit tab Take 1 Tab by mouth daily.  temazepam (RESTORIL) 15 mg capsule TAKE ONE CAPSULE BY MOUTH AT BEDTIME,as needed    citalopram (CELEXA) 10 mg tablet Take 10 mg by mouth daily. Per anesthesia protocol:instructed to take am of surgery.   Indications: ANXIETY WITH DEPRESSION         Labs:  Hospital Outpatient Visit on 01/30/2019   Component Date Value Ref Range Status    WBC 01/30/2019 4.7  4.3 - 11.1 K/uL Final    RBC 01/30/2019 3.94* 4.05 - 5.2 M/uL Final    HGB 01/30/2019 11.9  11.7 - 15.4 g/dL Final    HCT 01/30/2019 36.0  35.8 - 46.3 % Final    MCV 01/30/2019 91.4  79.6 - 97.8 FL Final    MCH 01/30/2019 30.2  26.1 - 32.9 PG Final    MCHC 01/30/2019 33.1  31.4 - 35.0 g/dL Final    RDW 01/30/2019 13.2  11.9 - 14.6 % Final    PLATELET 99/46/1949 434  150 - 450 K/uL Final    MPV 01/30/2019 11.8  9.4 - 12.3 FL Final    ABSOLUTE NRBC 01/30/2019 0.00  0.0 - 0.2 K/uL Final    **Note: Absolute NRBC parameter is now reported with Hemogram**    DF 01/30/2019 AUTOMATED    Final    NEUTROPHILS 01/30/2019 49  43 - 78 % Final    LYMPHOCYTES 01/30/2019 40  13 - 44 % Final    MONOCYTES 01/30/2019 7  4.0 - 12.0 % Final    EOSINOPHILS 01/30/2019 3  0.5 - 7.8 % Final    BASOPHILS 01/30/2019 0  0.0 - 2.0 % Final    IMMATURE GRANULOCYTES 01/30/2019 0  0.0 - 5.0 % Final    ABS. NEUTROPHILS 01/30/2019 2.3  1.7 - 8.2 K/UL Final    ABS. LYMPHOCYTES 01/30/2019 1.9  0.5 - 4.6 K/UL Final    ABS. MONOCYTES 01/30/2019 0.3  0.1 - 1.3 K/UL Final    ABS. EOSINOPHILS 01/30/2019 0.2  0.0 - 0.8 K/UL Final    ABS. BASOPHILS 01/30/2019 0.0  0.0 - 0.2 K/UL Final    ABS. IMM. GRANS. 01/30/2019 0.0  0.0 - 0.5 K/UL Final    Sodium 01/30/2019 139  136 - 145 mmol/L Final    Potassium 01/30/2019 3.8  3.5 - 5.1 mmol/L Final    Chloride 01/30/2019 98  98 - 107 mmol/L Final    CO2 01/30/2019 33* 21 - 32 mmol/L Final    Anion gap 01/30/2019 8  mmol/L Final    Glucose 01/30/2019 86  65 - 100 mg/dL Final    Comment: 47 - 60 mg/dl Consistent with, but not fully diagnostic of hypoglycemia. 101 - 125 mg/dl Impaired fasting glucose/consistent with pre-diabetes mellitus  > 126 mg/dl Fasting glucose consistent with overt diabetes mellitus      BUN 01/30/2019 18  8 - 23 MG/DL Final    Creatinine 01/30/2019 0.73  0.6 - 1.0 MG/DL Final    GFR est AA 01/30/2019 >60  >60 ml/min/1.73m2 Final    GFR est non-AA 01/30/2019 >60  ml/min/1.73m2 Final    Comment: (NOTE)  Estimated GFR is calculated using the Modification of Diet in Renal   Disease (MDRD) Study equation, reported for both  Americans   (GFRAA) and non- Americans (GFRNA), and normalized to 1.73m2   body surface area. The physician must decide which value applies to   the patient. The MDRD study equation should only be used in   individuals age 25 or older.  It has not been validated for the   following: pregnant women, patients with serious comorbid conditions,   or on certain medications, or persons with extremes of body size,   muscle mass, or nutritional status.  Calcium 01/30/2019 9.3  8.3 - 10.4 MG/DL Final    Special Requests: 01/30/2019 BENITA    Final    Culture result: 01/30/2019 SA target not detected. A MRSA NEGATIVE, SA NEGATIVE test result does not preclude MRSA or SA nasal colonization.     Final    Prothrombin time 01/30/2019 12.9  11.7 - 14.5 sec Final    INR 01/30/2019 1.0    Final    Comment: Suggested therapeutic INR range:  Venous thrombosis and embolus  2.0-3.0  Prosthetic heart valve         2.5-3.5  ** Note new reference range and method **      aPTT 01/30/2019 25.6  24.7 - 39.8 SEC Final    Comment: Heparin Therapeutic Range = 74 - 123 seconds  In addition to factor deficiency, monitoring heparin therapy, etc., evaluation of a prolonged aPTT result should include consideration of preanalytic variables such as heparin flush contamination, specimen integrity issues, etc.      Color 01/30/2019 YELLOW    Final    Appearance 01/30/2019 CLEAR    Final    Specific gravity 01/30/2019 1.008  1.001 - 1.023   Final    pH (UA) 01/30/2019 7.5  5.0 - 9.0   Final    Protein 01/30/2019 NEGATIVE   NEG mg/dL Final    Glucose 01/30/2019 NEGATIVE   mg/dL Final    Ketone 01/30/2019 NEGATIVE   NEG mg/dL Final    Bilirubin 01/30/2019 NEGATIVE   NEG   Final    Blood 01/30/2019 NEGATIVE   NEG   Final    Urobilinogen 01/30/2019 0.2  0.2 - 1.0 EU/dL Final    Nitrites 01/30/2019 NEGATIVE   NEG   Final    Leukocyte Esterase 01/30/2019 TRACE* NEG   Final    WBC 01/30/2019 0-3  0 /hpf Final    RBC 01/30/2019 0-3  0 /hpf Final    Epithelial cells 01/30/2019 0-3  0 /hpf Final    Bacteria 01/30/2019 0  0 /hpf Final    Casts 01/30/2019 0  0 /lpf Final                 Patient Active Problem List   Diagnosis Code    Malaise and fatigue R53.81, R53.83    Diaphoresis/hyperhidrosis R61    Abnormal EKG R94.31    Bradycardia R00.1    Elevated blood pressure reading R03.0    Preop cardiovascular exam Z01.810    Status post total right knee replacement Z96.651         Signed By: JCAEK Espinosa  January 31, 2019

## 2019-02-03 ENCOUNTER — ANESTHESIA EVENT (OUTPATIENT)
Dept: SURGERY | Age: 84
DRG: 470 | End: 2019-02-03
Payer: MEDICARE

## 2019-02-04 ENCOUNTER — ANESTHESIA (OUTPATIENT)
Dept: SURGERY | Age: 84
DRG: 470 | End: 2019-02-04
Payer: MEDICARE

## 2019-02-04 ENCOUNTER — HOSPITAL ENCOUNTER (INPATIENT)
Age: 84
LOS: 2 days | Discharge: HOME HEALTH | DRG: 470 | End: 2019-02-06
Attending: ORTHOPAEDIC SURGERY | Admitting: ORTHOPAEDIC SURGERY
Payer: MEDICARE

## 2019-02-04 DIAGNOSIS — M17.12 PRIMARY OSTEOARTHRITIS OF LEFT KNEE: ICD-10-CM

## 2019-02-04 DIAGNOSIS — R03.0 ELEVATED BLOOD PRESSURE READING: Chronic | ICD-10-CM

## 2019-02-04 DIAGNOSIS — R00.1 BRADYCARDIA: Chronic | ICD-10-CM

## 2019-02-04 DIAGNOSIS — Z96.652 STATUS POST TOTAL KNEE REPLACEMENT, LEFT: Primary | ICD-10-CM

## 2019-02-04 PROBLEM — M19.90 OSTEOARTHRITIS: Status: ACTIVE | Noted: 2019-02-04

## 2019-02-04 LAB
ABO + RH BLD: NORMAL
BLOOD GROUP ANTIBODIES SERPL: NORMAL
GLUCOSE BLD STRIP.AUTO-MCNC: 90 MG/DL (ref 65–100)
HGB BLD-MCNC: 9.7 G/DL (ref 11.7–15.4)
SPECIMEN EXP DATE BLD: NORMAL

## 2019-02-04 PROCEDURE — 94762 N-INVAS EAR/PLS OXIMTRY CONT: CPT

## 2019-02-04 PROCEDURE — 74011250636 HC RX REV CODE- 250/636: Performed by: ANESTHESIOLOGY

## 2019-02-04 PROCEDURE — 77010033678 HC OXYGEN DAILY

## 2019-02-04 PROCEDURE — 77030020782 HC GWN BAIR PAWS FLX 3M -B: Performed by: ANESTHESIOLOGY

## 2019-02-04 PROCEDURE — 77030007880 HC KT SPN EPDRL BBMI -B: Performed by: ANESTHESIOLOGY

## 2019-02-04 PROCEDURE — 74011250637 HC RX REV CODE- 250/637: Performed by: PHYSICIAN ASSISTANT

## 2019-02-04 PROCEDURE — 99221 1ST HOSP IP/OBS SF/LOW 40: CPT | Performed by: PHYSICAL MEDICINE & REHABILITATION

## 2019-02-04 PROCEDURE — 74011250636 HC RX REV CODE- 250/636: Performed by: ORTHOPAEDIC SURGERY

## 2019-02-04 PROCEDURE — 77030036688 HC BLNKT CLD THER S2SG -B

## 2019-02-04 PROCEDURE — 74011000250 HC RX REV CODE- 250

## 2019-02-04 PROCEDURE — 76210000016 HC OR PH I REC 1 TO 1.5 HR: Performed by: ORTHOPAEDIC SURGERY

## 2019-02-04 PROCEDURE — 74011000250 HC RX REV CODE- 250: Performed by: ORTHOPAEDIC SURGERY

## 2019-02-04 PROCEDURE — 77030003602 HC NDL NRV BLK BBMI -B: Performed by: ANESTHESIOLOGY

## 2019-02-04 PROCEDURE — 76010000171 HC OR TIME 2 TO 2.5 HR INTENSV-TIER 1: Performed by: ORTHOPAEDIC SURGERY

## 2019-02-04 PROCEDURE — 74011000302 HC RX REV CODE- 302: Performed by: ORTHOPAEDIC SURGERY

## 2019-02-04 PROCEDURE — 86900 BLOOD TYPING SEROLOGIC ABO: CPT

## 2019-02-04 PROCEDURE — 82962 GLUCOSE BLOOD TEST: CPT

## 2019-02-04 PROCEDURE — 74011250636 HC RX REV CODE- 250/636: Performed by: PHYSICIAN ASSISTANT

## 2019-02-04 PROCEDURE — 97110 THERAPEUTIC EXERCISES: CPT

## 2019-02-04 PROCEDURE — 77030035236 HC SUT PDS STRATFX BARB J&J -B: Performed by: ORTHOPAEDIC SURGERY

## 2019-02-04 PROCEDURE — 36415 COLL VENOUS BLD VENIPUNCTURE: CPT

## 2019-02-04 PROCEDURE — 76942 ECHO GUIDE FOR BIOPSY: CPT | Performed by: ORTHOPAEDIC SURGERY

## 2019-02-04 PROCEDURE — 77030032490 HC SLV COMPR SCD KNE COVD -B

## 2019-02-04 PROCEDURE — 77030002966 HC SUT PDS J&J -A: Performed by: ORTHOPAEDIC SURGERY

## 2019-02-04 PROCEDURE — 77030019557 HC ELECTRD VES SEAL MEDT -F: Performed by: ORTHOPAEDIC SURGERY

## 2019-02-04 PROCEDURE — 77030012935 HC DRSG AQUACEL BMS -B: Performed by: ORTHOPAEDIC SURGERY

## 2019-02-04 PROCEDURE — 77030031139 HC SUT VCRL2 J&J -A: Performed by: ORTHOPAEDIC SURGERY

## 2019-02-04 PROCEDURE — 65270000029 HC RM PRIVATE

## 2019-02-04 PROCEDURE — 76010010054 HC POST OP PAIN BLOCK: Performed by: ORTHOPAEDIC SURGERY

## 2019-02-04 PROCEDURE — 77030035643 HC BLD SAW OSC PRECIS STRY -C: Performed by: ORTHOPAEDIC SURGERY

## 2019-02-04 PROCEDURE — 94760 N-INVAS EAR/PLS OXIMETRY 1: CPT

## 2019-02-04 PROCEDURE — 77030013708 HC HNDPC SUC IRR PULS STRY –B: Performed by: ORTHOPAEDIC SURGERY

## 2019-02-04 PROCEDURE — 77030003665 HC NDL SPN BBMI -A: Performed by: ANESTHESIOLOGY

## 2019-02-04 PROCEDURE — 77030018836 HC SOL IRR NACL ICUM -A: Performed by: ORTHOPAEDIC SURGERY

## 2019-02-04 PROCEDURE — 74011250636 HC RX REV CODE- 250/636

## 2019-02-04 PROCEDURE — 97161 PT EVAL LOW COMPLEX 20 MIN: CPT

## 2019-02-04 PROCEDURE — 0SRD0JA REPLACEMENT OF LEFT KNEE JOINT WITH SYNTHETIC SUBSTITUTE, UNCEMENTED, OPEN APPROACH: ICD-10-PCS | Performed by: ORTHOPAEDIC SURGERY

## 2019-02-04 PROCEDURE — 74011000258 HC RX REV CODE- 258: Performed by: ORTHOPAEDIC SURGERY

## 2019-02-04 PROCEDURE — 85018 HEMOGLOBIN: CPT

## 2019-02-04 PROCEDURE — 97165 OT EVAL LOW COMPLEX 30 MIN: CPT

## 2019-02-04 PROCEDURE — 77030020263 HC SOL INJ SOD CL0.9% LFCR 1000ML

## 2019-02-04 PROCEDURE — 77030037364 HC TIB INST CR  DISP STRY -C: Performed by: ORTHOPAEDIC SURGERY

## 2019-02-04 PROCEDURE — 77030034849: Performed by: ORTHOPAEDIC SURGERY

## 2019-02-04 PROCEDURE — C1776 JOINT DEVICE (IMPLANTABLE): HCPCS | Performed by: ORTHOPAEDIC SURGERY

## 2019-02-04 PROCEDURE — 76060000035 HC ANESTHESIA 2 TO 2.5 HR: Performed by: ORTHOPAEDIC SURGERY

## 2019-02-04 PROCEDURE — 77030037363 HC FEM INST CR  DISP STRY -C: Performed by: ORTHOPAEDIC SURGERY

## 2019-02-04 PROCEDURE — 77030006720 HC BLD PAT RMR ZIMM -B: Performed by: ORTHOPAEDIC SURGERY

## 2019-02-04 PROCEDURE — 86580 TB INTRADERMAL TEST: CPT | Performed by: ORTHOPAEDIC SURGERY

## 2019-02-04 PROCEDURE — 77030008467 HC STPLR SKN COVD -B: Performed by: ORTHOPAEDIC SURGERY

## 2019-02-04 PROCEDURE — 77030002912 HC SUT ETHBND J&J -A: Performed by: ORTHOPAEDIC SURGERY

## 2019-02-04 DEVICE — IMPLANTABLE DEVICE: Type: IMPLANTABLE DEVICE | Site: KNEE | Status: FUNCTIONAL

## 2019-02-04 DEVICE — TIBIAL COMPONENT
Type: IMPLANTABLE DEVICE | Site: KNEE | Status: FUNCTIONAL
Brand: TRIATHLON

## 2019-02-04 DEVICE — CRUCIATE RETAINING FEMORAL
Type: IMPLANTABLE DEVICE | Site: KNEE | Status: FUNCTIONAL
Brand: TRIATHLON

## 2019-02-04 DEVICE — PATELLA
Type: IMPLANTABLE DEVICE | Site: KNEE | Status: FUNCTIONAL
Brand: TRIATHLON

## 2019-02-04 RX ORDER — HYDROMORPHONE HYDROCHLORIDE 2 MG/1
2 TABLET ORAL
Qty: 40 TAB | Refills: 0 | Status: SHIPPED | OUTPATIENT
Start: 2019-02-04 | End: 2021-11-02

## 2019-02-04 RX ORDER — HYDROMORPHONE HYDROCHLORIDE 2 MG/1
2 TABLET ORAL
Status: DISCONTINUED | OUTPATIENT
Start: 2019-02-04 | End: 2019-02-06 | Stop reason: HOSPADM

## 2019-02-04 RX ORDER — METOPROLOL SUCCINATE 25 MG/1
25 TABLET, EXTENDED RELEASE ORAL
Status: DISCONTINUED | OUTPATIENT
Start: 2019-02-04 | End: 2019-02-06 | Stop reason: HOSPADM

## 2019-02-04 RX ORDER — ASPIRIN 81 MG/1
81 TABLET ORAL EVERY 12 HOURS
Qty: 60 TAB | Refills: 1 | Status: SHIPPED | OUTPATIENT
Start: 2019-02-04 | End: 2019-03-11

## 2019-02-04 RX ORDER — HYDROMORPHONE HYDROCHLORIDE 2 MG/ML
1 INJECTION, SOLUTION INTRAMUSCULAR; INTRAVENOUS; SUBCUTANEOUS
Status: DISCONTINUED | OUTPATIENT
Start: 2019-02-04 | End: 2019-02-06 | Stop reason: HOSPADM

## 2019-02-04 RX ORDER — ACETAMINOPHEN 500 MG
1000 TABLET ORAL EVERY 6 HOURS
Status: DISCONTINUED | OUTPATIENT
Start: 2019-02-05 | End: 2019-02-06 | Stop reason: HOSPADM

## 2019-02-04 RX ORDER — HYDROMORPHONE HYDROCHLORIDE 2 MG/ML
0.5 INJECTION, SOLUTION INTRAMUSCULAR; INTRAVENOUS; SUBCUTANEOUS
Status: DISCONTINUED | OUTPATIENT
Start: 2019-02-04 | End: 2019-02-04 | Stop reason: HOSPADM

## 2019-02-04 RX ORDER — SODIUM CHLORIDE 0.9 % (FLUSH) 0.9 %
5-40 SYRINGE (ML) INJECTION AS NEEDED
Status: DISCONTINUED | OUTPATIENT
Start: 2019-02-04 | End: 2019-02-04 | Stop reason: HOSPADM

## 2019-02-04 RX ORDER — CEFAZOLIN SODIUM/WATER 2 G/20 ML
2 SYRINGE (ML) INTRAVENOUS ONCE
Status: COMPLETED | OUTPATIENT
Start: 2019-02-04 | End: 2019-02-04

## 2019-02-04 RX ORDER — DIPHENHYDRAMINE HCL 25 MG
25 CAPSULE ORAL
Status: DISCONTINUED | OUTPATIENT
Start: 2019-02-04 | End: 2019-02-06 | Stop reason: HOSPADM

## 2019-02-04 RX ORDER — ONDANSETRON 2 MG/ML
4 INJECTION INTRAMUSCULAR; INTRAVENOUS
Status: DISCONTINUED | OUTPATIENT
Start: 2019-02-04 | End: 2019-02-06 | Stop reason: HOSPADM

## 2019-02-04 RX ORDER — OXYCODONE AND ACETAMINOPHEN 10; 325 MG/1; MG/1
1 TABLET ORAL AS NEEDED
Status: DISCONTINUED | OUTPATIENT
Start: 2019-02-04 | End: 2019-02-04 | Stop reason: HOSPADM

## 2019-02-04 RX ORDER — ACETAMINOPHEN 10 MG/ML
1000 INJECTION, SOLUTION INTRAVENOUS ONCE
Status: COMPLETED | OUTPATIENT
Start: 2019-02-04 | End: 2019-02-04

## 2019-02-04 RX ORDER — ROPIVACAINE HYDROCHLORIDE 2 MG/ML
INJECTION, SOLUTION EPIDURAL; INFILTRATION; PERINEURAL
Status: COMPLETED | OUTPATIENT
Start: 2019-02-04 | End: 2019-02-04

## 2019-02-04 RX ORDER — DEXAMETHASONE SODIUM PHOSPHATE 100 MG/10ML
10 INJECTION INTRAMUSCULAR; INTRAVENOUS ONCE
Status: COMPLETED | OUTPATIENT
Start: 2019-02-05 | End: 2019-02-05

## 2019-02-04 RX ORDER — FENTANYL CITRATE 50 UG/ML
100 INJECTION, SOLUTION INTRAMUSCULAR; INTRAVENOUS ONCE
Status: COMPLETED | OUTPATIENT
Start: 2019-02-04 | End: 2019-02-04

## 2019-02-04 RX ORDER — OXYCODONE HYDROCHLORIDE 5 MG/1
5 TABLET ORAL
Status: DISCONTINUED | OUTPATIENT
Start: 2019-02-04 | End: 2019-02-04 | Stop reason: HOSPADM

## 2019-02-04 RX ORDER — NEOMYCIN AND POLYMYXIN B SULFATES 40; 200000 MG/ML; [USP'U]/ML
SOLUTION IRRIGATION AS NEEDED
Status: DISCONTINUED | OUTPATIENT
Start: 2019-02-04 | End: 2019-02-04 | Stop reason: HOSPADM

## 2019-02-04 RX ORDER — ONDANSETRON 2 MG/ML
INJECTION INTRAMUSCULAR; INTRAVENOUS AS NEEDED
Status: DISCONTINUED | OUTPATIENT
Start: 2019-02-04 | End: 2019-02-04 | Stop reason: HOSPADM

## 2019-02-04 RX ORDER — KETOROLAC TROMETHAMINE 30 MG/ML
INJECTION, SOLUTION INTRAMUSCULAR; INTRAVENOUS AS NEEDED
Status: DISCONTINUED | OUTPATIENT
Start: 2019-02-04 | End: 2019-02-04 | Stop reason: HOSPADM

## 2019-02-04 RX ORDER — SODIUM CHLORIDE 0.9 % (FLUSH) 0.9 %
5-40 SYRINGE (ML) INJECTION EVERY 8 HOURS
Status: DISCONTINUED | OUTPATIENT
Start: 2019-02-04 | End: 2019-02-04 | Stop reason: HOSPADM

## 2019-02-04 RX ORDER — ROPIVACAINE HYDROCHLORIDE 2 MG/ML
INJECTION, SOLUTION EPIDURAL; INFILTRATION; PERINEURAL AS NEEDED
Status: DISCONTINUED | OUTPATIENT
Start: 2019-02-04 | End: 2019-02-04 | Stop reason: HOSPADM

## 2019-02-04 RX ORDER — AMOXICILLIN 250 MG
2 CAPSULE ORAL DAILY
Status: DISCONTINUED | OUTPATIENT
Start: 2019-02-05 | End: 2019-02-06 | Stop reason: HOSPADM

## 2019-02-04 RX ORDER — PROPOFOL 10 MG/ML
INJECTION, EMULSION INTRAVENOUS
Status: DISCONTINUED | OUTPATIENT
Start: 2019-02-04 | End: 2019-02-04 | Stop reason: HOSPADM

## 2019-02-04 RX ORDER — NALOXONE HYDROCHLORIDE 0.4 MG/ML
.2-.4 INJECTION, SOLUTION INTRAMUSCULAR; INTRAVENOUS; SUBCUTANEOUS
Status: DISCONTINUED | OUTPATIENT
Start: 2019-02-04 | End: 2019-02-06 | Stop reason: HOSPADM

## 2019-02-04 RX ORDER — SODIUM CHLORIDE 0.9 % (FLUSH) 0.9 %
5-40 SYRINGE (ML) INJECTION EVERY 8 HOURS
Status: DISCONTINUED | OUTPATIENT
Start: 2019-02-04 | End: 2019-02-06 | Stop reason: HOSPADM

## 2019-02-04 RX ORDER — TEMAZEPAM 15 MG/1
15 CAPSULE ORAL
Status: DISCONTINUED | OUTPATIENT
Start: 2019-02-04 | End: 2019-02-06 | Stop reason: HOSPADM

## 2019-02-04 RX ORDER — MIDAZOLAM HYDROCHLORIDE 1 MG/ML
2 INJECTION, SOLUTION INTRAMUSCULAR; INTRAVENOUS
Status: COMPLETED | OUTPATIENT
Start: 2019-02-04 | End: 2019-02-04

## 2019-02-04 RX ORDER — AMLODIPINE BESYLATE 5 MG/1
2.5 TABLET ORAL
Status: DISCONTINUED | OUTPATIENT
Start: 2019-02-04 | End: 2019-02-06 | Stop reason: HOSPADM

## 2019-02-04 RX ORDER — SODIUM CHLORIDE 9 MG/ML
100 INJECTION, SOLUTION INTRAVENOUS CONTINUOUS
Status: DISPENSED | OUTPATIENT
Start: 2019-02-04 | End: 2019-02-06

## 2019-02-04 RX ORDER — DEXAMETHASONE SODIUM PHOSPHATE 4 MG/ML
INJECTION, SOLUTION INTRA-ARTICULAR; INTRALESIONAL; INTRAMUSCULAR; INTRAVENOUS; SOFT TISSUE
Status: COMPLETED | OUTPATIENT
Start: 2019-02-04 | End: 2019-02-04

## 2019-02-04 RX ORDER — CITALOPRAM 10 MG/1
10 TABLET ORAL DAILY
Status: DISCONTINUED | OUTPATIENT
Start: 2019-02-05 | End: 2019-02-06 | Stop reason: HOSPADM

## 2019-02-04 RX ORDER — TRANEXAMIC ACID 100 MG/ML
INJECTION, SOLUTION INTRAVENOUS AS NEEDED
Status: DISCONTINUED | OUTPATIENT
Start: 2019-02-04 | End: 2019-02-04 | Stop reason: HOSPADM

## 2019-02-04 RX ORDER — BUPIVACAINE HYDROCHLORIDE 7.5 MG/ML
INJECTION, SOLUTION INTRASPINAL
Status: COMPLETED | OUTPATIENT
Start: 2019-02-04 | End: 2019-02-04

## 2019-02-04 RX ORDER — CEFAZOLIN SODIUM/WATER 2 G/20 ML
2 SYRINGE (ML) INTRAVENOUS EVERY 8 HOURS
Status: COMPLETED | OUTPATIENT
Start: 2019-02-04 | End: 2019-02-05

## 2019-02-04 RX ORDER — SODIUM CHLORIDE 0.9 % (FLUSH) 0.9 %
5-40 SYRINGE (ML) INJECTION AS NEEDED
Status: DISCONTINUED | OUTPATIENT
Start: 2019-02-04 | End: 2019-02-06 | Stop reason: HOSPADM

## 2019-02-04 RX ORDER — SODIUM CHLORIDE, SODIUM LACTATE, POTASSIUM CHLORIDE, CALCIUM CHLORIDE 600; 310; 30; 20 MG/100ML; MG/100ML; MG/100ML; MG/100ML
75 INJECTION, SOLUTION INTRAVENOUS CONTINUOUS
Status: DISCONTINUED | OUTPATIENT
Start: 2019-02-04 | End: 2019-02-04 | Stop reason: HOSPADM

## 2019-02-04 RX ORDER — ASPIRIN 81 MG/1
81 TABLET ORAL EVERY 12 HOURS
Status: DISCONTINUED | OUTPATIENT
Start: 2019-02-04 | End: 2019-02-06 | Stop reason: HOSPADM

## 2019-02-04 RX ORDER — EPHEDRINE SULFATE 50 MG/ML
INJECTION, SOLUTION INTRAVENOUS AS NEEDED
Status: DISCONTINUED | OUTPATIENT
Start: 2019-02-04 | End: 2019-02-04 | Stop reason: HOSPADM

## 2019-02-04 RX ADMIN — ONDANSETRON 4 MG: 2 INJECTION INTRAMUSCULAR; INTRAVENOUS at 11:57

## 2019-02-04 RX ADMIN — SODIUM CHLORIDE 100 ML/HR: 900 INJECTION, SOLUTION INTRAVENOUS at 18:00

## 2019-02-04 RX ADMIN — SODIUM CHLORIDE, SODIUM LACTATE, POTASSIUM CHLORIDE, AND CALCIUM CHLORIDE 75 ML/HR: 600; 310; 30; 20 INJECTION, SOLUTION INTRAVENOUS at 10:21

## 2019-02-04 RX ADMIN — ASPIRIN 81 MG: 81 TABLET, COATED ORAL at 20:41

## 2019-02-04 RX ADMIN — TEMAZEPAM 15 MG: 15 CAPSULE ORAL at 23:05

## 2019-02-04 RX ADMIN — Medication 2 G: at 18:00

## 2019-02-04 RX ADMIN — TRANEXAMIC ACID 1 G: 100 INJECTION, SOLUTION INTRAVENOUS at 10:50

## 2019-02-04 RX ADMIN — DEXAMETHASONE SODIUM PHOSPHATE 5 MG: 4 INJECTION, SOLUTION INTRA-ARTICULAR; INTRALESIONAL; INTRAMUSCULAR; INTRAVENOUS; SOFT TISSUE at 09:43

## 2019-02-04 RX ADMIN — Medication 10 ML: at 18:01

## 2019-02-04 RX ADMIN — MIDAZOLAM 1 MG: 1 INJECTION INTRAMUSCULAR; INTRAVENOUS at 09:46

## 2019-02-04 RX ADMIN — ACETAMINOPHEN 1000 MG: 10 INJECTION, SOLUTION INTRAVENOUS at 18:00

## 2019-02-04 RX ADMIN — Medication 1 AMPULE: at 20:41

## 2019-02-04 RX ADMIN — PROPOFOL 50 MCG/KG/MIN: 10 INJECTION, EMULSION INTRAVENOUS at 10:48

## 2019-02-04 RX ADMIN — ACETAMINOPHEN 1000 MG: 500 TABLET, FILM COATED ORAL at 23:05

## 2019-02-04 RX ADMIN — Medication 3 AMPULE: at 08:22

## 2019-02-04 RX ADMIN — SODIUM CHLORIDE, SODIUM LACTATE, POTASSIUM CHLORIDE, AND CALCIUM CHLORIDE 75 ML/HR: 600; 310; 30; 20 INJECTION, SOLUTION INTRAVENOUS at 08:22

## 2019-02-04 RX ADMIN — SODIUM CHLORIDE, SODIUM LACTATE, POTASSIUM CHLORIDE, AND CALCIUM CHLORIDE: 600; 310; 30; 20 INJECTION, SOLUTION INTRAVENOUS at 11:47

## 2019-02-04 RX ADMIN — BUPIVACAINE HYDROCHLORIDE 9 MG: 7.5 INJECTION, SOLUTION INTRASPINAL at 10:45

## 2019-02-04 RX ADMIN — TUBERCULIN PURIFIED PROTEIN DERIVATIVE 5 UNITS: 5 INJECTION, SOLUTION INTRADERMAL at 08:25

## 2019-02-04 RX ADMIN — FENTANYL CITRATE 100 MCG: 50 INJECTION INTRAMUSCULAR; INTRAVENOUS at 09:46

## 2019-02-04 RX ADMIN — Medication 2 G: at 10:35

## 2019-02-04 RX ADMIN — ROPIVACAINE HYDROCHLORIDE 40 MG: 2 INJECTION, SOLUTION EPIDURAL; INFILTRATION; PERINEURAL at 09:43

## 2019-02-04 RX ADMIN — EPHEDRINE SULFATE 5 MG: 50 INJECTION, SOLUTION INTRAVENOUS at 11:28

## 2019-02-04 RX ADMIN — HYDROMORPHONE HYDROCHLORIDE 2 MG: 2 TABLET ORAL at 20:41

## 2019-02-04 NOTE — PROGRESS NOTES
TRANSFER - IN REPORT:    Verbal report received from Chayo(name) on Anne Jules  being received from Maya's Mom) for routine progression of care      Report consisted of patients Situation, Background, Assessment and   Recommendations(SBAR). Information from the following report(s) SBAR, Kardex, Intake/Output and MAR was reviewed with the receiving nurse. Opportunity for questions and clarification was provided. Assessment completed upon patients arrival to unit and care assumed.

## 2019-02-04 NOTE — PERIOP NOTES
TRANSFER - OUT REPORT:    Verbal report given to [facility] RN on [employer]  being transferred to  for routine post - op       Report consisted of patients Situation, Background, Assessment and   Recommendations(SBAR). Information from the following report(s) SBAR was reviewed with the receiving nurse. Opportunity for questions and clarification was provided.       Patient transported with:   [place]

## 2019-02-04 NOTE — PROGRESS NOTES
Problem: Mobility Impaired (Adult and Pediatric)  Goal: *Acute Goals and Plan of Care (Insert Text)  GOALS (1-4 days):  (1.)Ms. Bucky Arteaga will move from supine to sit and sit to supine  in bed with STAND BY ASSIST.  (2.)Ms. Bucky Arteaga will transfer from bed to chair and chair to bed with STAND BY ASSIST using the least restrictive device. (3.)Ms. Bucky Arteaga will ambulate with STAND BY ASSIST for 150 feet with the least restrictive device. (4.)Ms. Bucky Arteaga will ambulate up/down 2 steps with bilateral  railing with CONTACT GUARD ASSIST with no device. (5.)Ms. Bucky Arteaga will increase left knee ROM to 5°-80°.  ________________________________________________________________________________________________      PHYSICAL THERAPY Joint camp tKa: Initial Assessment, PM 2/4/2019  INPATIENT: Hospital Day: 1  Payor: [facility] / Plan: [address] [place] [address] HMO / Product Type: Managed Care Medicare /      NAME/AGE/GENDER: Cheyenne June is a 82 y.o. female   PRIMARY DIAGNOSIS:  Primary osteoarthritis of left knee [M17.12]   Procedure(s) and Anesthesia Type:     * LEFT KNEE ARTHROPLASTY TOTAL - Spinal (Left)  ICD-10: Treatment Diagnosis:    · Pain in Left Knee (M25.562)  · Stiffness of Left Knee, Not elsewhere classified (M25.662)  · Difficulty in walking, Not elsewhere classified (R26.2)      ASSESSMENT:     Ms. Bucky Arteaga presents with decreased functional mobility and gait as well as decreased rom and strength of left LE s/p left tka. She plans to go to rehab. This section established at most recent assessment   PROBLEM LIST (Impairments causing functional limitations):  1. Decreased Strength  2. Decreased ADL/Functional Activities  3. Decreased Transfer Abilities  4. Decreased Ambulation Ability/Technique  5. Decreased Balance  6. Increased Pain  7. Decreased Activity Tolerance  8. Decreased Flexibility/Joint Mobility  9.  Decreased [place] with Home Exercise Program   INTERVENTIONS PLANNED: (Benefits and precautions of physical therapy have been discussed with the patient.)  1. Bed Mobility  2. Gait Training  3. Home Exercise Program (HEP)  4. Therapeutic Exercise/Strengthening  5. Transfer Training  6. Range of Motion: active/assisted/passive  7. Therapeutic Activities  8. Group Therapy     TREATMENT PLAN: Frequency/Duration: Follow patient BID for duration of hospital stay to address above goals. Rehabilitation Potential For Stated Goals: Good     RECOMMENDED REHABILITATION/EQUIPMENT: (at time of discharge pending progress): Continue Skilled Therapy and pt. plans for rehab. HISTORY:   History of Present Injury/Illness (Reason for Referral):  S/p left tka  Past Medical History/Comorbidities:   Ms. Andressa Henao  has a past medical history of Arthritis, Hypertension, Insomnia, Murmur, Psychiatric disorder, Status post total knee replacement, left (2/4/2019), and Status post total right knee replacement (1/24/2018). She also has no past medical history of Adverse effect of anesthesia, Aneurysm ([address] Arrhythmia, Asthma, Autoimmune disease ([address] CAD (coronary artery disease), Cancer ([address] Chronic kidney disease, Chronic obstructive pulmonary disease ([address] Chronic pain, Coagulation disorder ([address] Diabetes ([address] Difficult intubation, Endocarditis, GERD (gastroesophageal reflux disease), Heart failure ([address] Ill-defined condition, Liver disease, Malignant hyperthermia due to anesthesia, Morbid obesity ([address] Nausea & vomiting, Nicotine vapor product user, Non-nicotine vapor product user, Pseudocholinesterase deficiency, PUD (peptic ulcer disease), Rheumatic fever, Seizures ([address] Sleep apnea, Stroke ([address] Thromboembolus ([address] or Thyroid disease. Ms. Andressa Henao  has a past surgical history that includes hx knee arthroscopy (Right); hx colonoscopy; hx knee replacement (Right, 01/24/2018); and hx cataract removal (Bilateral, 2018).   Social History/Living Environment:   Patient Expects to be Discharged to[de-identified] Other (comment)(Pt will go to the OR)  Prior Level of Function/Work/Activity:  independent   Number of Personal Factors/Comorbidities that affect the Plan of Care: 1-2: MODERATE COMPLEXITY   EXAMINATION:   Most Recent Physical Functioning:   Gross Assessment: Yes  Gross Assessment  AROM: Within functional limits(right LE)  Strength: Generally decreased, functional(right LE)          LLE AROM  L Knee Flexion: 60  L Knee Extension: 10          Bed Mobility  Supine to Sit: Contact guard assistance    Transfers  Sit to Stand: Contact guard assistance;Minimum assistance  Stand to Sit: Minimum assistance  Bed to Chair: Contact guard assistance;Minimum assistance    Balance  Sitting: Intact  Standing: Pull to stand; With support              Weight Bearing Status  Left Side Weight Bearing: As tolerated  Distance (ft): 15 Feet (ft)  Ambulation - Level of Assistance: Minimal assistance;Contact guard assistance  Assistive Device: Walker, rolling  Speed/Joseline: Delayed  Step Length: Left shortened;Right shortened  Stance: Left decreased  Gait Abnormalities: Antalgic;Decreased step clearance  Interventions: Safety awareness training;Verbal cues     Braces/Orthotics:     Left Knee Cold  Type: Cryocuff      Body Structures Involved:  1. Bones  2. Joints  3. Muscles  4. Ligaments Body Functions Affected:  1. Movement Related Activities and Participation Affected:  1. Mobility   Number of elements that affect the Plan of Care: 3: MODERATE COMPLEXITY   CLINICAL PRESENTATION:   Presentation: Stable and uncomplicated: LOW COMPLEXITY   CLINICAL DECISION MAKIN \[facility]\"" [address] [address] AM-PAC 6 Clicks   Basic Mobility Inpatient Short Form  How much difficulty does the patient currently have. .. Unable A Lot A Little None   1. Turning over in bed (including adjusting bedclothes, sheets and blankets)? [] 1   [] 2   [x] 3   [] 4   2.   Sitting down on and standing up from a chair with arms ( e.g., wheelchair, bedside commode, etc.)   [] 1   [] 2   [x] 3   [] 4   3.  Moving from lying on back to sitting on the side of the bed? [] 1   [] 2   [x] 3   [] 4   How much help from another person does the patient currently need. .. Total A Lot A Little None   4. Moving to and from a bed to a chair (including a wheelchair)? [] 1   [] 2   [x] 3   [] 4   5. Need to walk in hospital room? [] 1   [] 2   [x] 3   [] 4   6. Climbing 3-5 steps with a railing? [] 1   [x] 2   [] 3   [] 4   © 2007, Trustees of [address] [place] [address] [address] [address], under license to [employer]. All rights reserved        Score:  Initial: 17 Most Recent: X (Date: -- )    Interpretation of Tool:  Represents activities that are increasingly more difficult (i.e. Bed mobility, Transfers, Gait). Medical Necessity:     · Patient is expected to demonstrate progress in strength, range of motion and balance to decrease assistance required with theraputic exercises and functional mobility. Reason for Services/Other Comments:  · Patient continues to require present interventions due to patient's inability to perform theraputic exercises and functional mobility independently. Use of outcome tool(s) and clinical judgement create a POC that gives a: Clear prediction of patient's progress: LOW COMPLEXITY            TREATMENT:   (In addition to Assessment/Re-Assessment sessions the following treatments were rendered)     Pre-treatment Symptoms/Complaints:  Knee sore  Pain: Initial:      Post Session:  Did not rate     Therapeutic Exercise: (10 Minutes):  Exercises per grid below to improve mobility and strength. Required minimal visual, verbal and manual cues to promote proper body alignment, promote proper body posture and promote proper body mechanics. Progressed range and repetitions as indicated.      Date:  2/4 Date:   Date:     ACTIVITY/EXERCISE AM PM AM PM AM PM   GROUP THERAPY  []  []  []  []  []  []   Ankle Pumps  10a       Quad Sets  10a       Gluteal Sets  10a       Hip ABd/ADduction  10a       Straight Leg Raises Knee Slides  10a       Short Arc Quads         Long Arc Quads         Chair Slides                  B = bilateral; AA = active assistive; A = active; P = passive      Treatment/Session Assessment:     Response to Treatment:  Pt. Did well. Education:  [x] Home Exercises  [x] Fall Precautions  [] Hip Precautions [] D/C Instruction Review  [x] Knee/Hip Prosthesis Review  [x] Walker Management/Safety [] Adaptive Equipment as Needed       Interdisciplinary Collaboration:   o Occupational Therapist  o Registered Nurse    After treatment position/precautions:   o Up in chair  o Bed/Chair-wheels locked  o Bed in low position  o Caregiver at bedside  o Call light within reach  o Family at bedside    Compliance with Program/Exercises: Will assess as treatment progresses. No questions. Recommendations/Intent for next treatment session:  Treatment next visit will focus on increasing Ms. Whiet's independence with bed mobility, transfers, gait training, strength/ROM exercises, modalities for pain, and patient education.       Total Treatment Duration:  PT Patient Time In/Time Out  Time In: 1450  Time Out: 5 [facility],

## 2019-02-04 NOTE — H&P
The patient has end stage arthritis of the left knee. The patient was see and examined and there are no changes to the patient's orthopedic condition. They have tried conservative treatment for this condition; including antiinflammatories and lifestyle modifications and have failed. The necessity for the joint replacement is still present, and the H&P from the office is still current.  The patient will be admitted today for Procedure(s) (LRB):  LEFT KNEE ARTHROPLASTY TOTAL / Cydney Andre / FNB (Left)

## 2019-02-04 NOTE — PROGRESS NOTES
Coby reviewed chart and was informed by Rn and PT that pt desires to go back to [facility] for her Rehab again. pt was there a year ago. Coby called Megan to discuss after [facility] Link ref was made. Pt has a private room already reserved. Pt has [employer] and will need a precert done. Megan stated she had already started it therefore Sw did not open another case. Ant transfer to NH for Rehab on Wednesday.  Sw to cont to follow and assist. Chelsie Wagner

## 2019-02-04 NOTE — ANESTHESIA PREPROCEDURE EVALUATION
Anesthetic History No history of anesthetic complications Review of Systems / Medical History Patient summary reviewed and pertinent labs reviewed Pulmonary Within defined limits Neuro/Psych Psychiatric history Cardiovascular Hypertension Exercise tolerance: >4 METS Comments: Echo 7/17 nl EF, nl wall motion GI/Hepatic/Renal 
Within defined limits Endo/Other Arthritis Other Findings Physical Exam 
 
Airway Mallampati: II 
TM Distance: > 6 cm Neck ROM: normal range of motion Mouth opening: Normal 
 
 Cardiovascular Rhythm: regular Dental 
 
 
  
Pulmonary Abdominal 
GI exam deferred Other Findings Anesthetic Plan ASA: 2 Anesthesia type: spinal - femoral single shot Post-op pain plan if not by surgeon: peripheral nerve block single Induction: Intravenous Anesthetic plan and risks discussed with: Patient

## 2019-02-04 NOTE — PROGRESS NOTES
Problem: Self Care Deficits Care Plan (Adult)  Goal: *Acute Goals and Plan of Care (Insert Text)  GOALS:   DISCHARGE GOALS (in preparation for going home/rehab):  3 days  1. Ms. Criss Felder will perform one lower body dressing activity with minimal assistance required to demonstrate improved functional mobility and safety. 2.  Ms. Criss Felder will perform one lower body bathing activity with minimal assistance required to demonstrate improved functional mobility and safety. 3.  Ms. Criss Felder will perform toileting/toilet transfer with contact guard assistance to demonstrate improved functional mobility and safety. 4.  Ms. Criss Felder will perform shower transfer with contact guard assistance to demonstrate improved functional mobility and safety. JOINT CAMP OCCUPATIONAL THERAPY TKA: Initial Assessment 2/4/2019  INPATIENT: Hospital Day: 1  Payor: Silver [employer] / Plan: [address] [place] [address] HMO / Product Type: [employer] /      NAME/AGE/GENDER: Julian Jean is a 82 y.o. female   PRIMARY DIAGNOSIS:  Primary osteoarthritis of left knee [M17.12]   Procedure(s) and Anesthesia Type:     * LEFT KNEE ARTHROPLASTY TOTAL - Spinal (Left)  ICD-10: Treatment Diagnosis:    · Pain in Left Knee (M25.562)  · Stiffness of Left Knee, Not elsewhere classified (M56.977)      ASSESSMENT:     Ms. Criss Felder is s/p Left TKA and presents with decreased weight bearing on L LE and decreased independence with functional mobility and activities of daily living as compared to baseline level of function and safety. Patient would benefit from skilled Occupational Therapy to maximize independence and safety with self-care task and functional mobility. Pt would also benefit from education on adaptive equipment and safety precautions in preparation for going home vs rehab. Had L TKA 1/2018 and went to [facility] and has same discharge plan this time. She did very well this date mobilizing using a RW from bed to recliner.        This section established at most recent assessment   PROBLEM LIST (Impairments causing functional limitations):  1. Decreased Strength  2. Decreased ADL/Functional Activities  3. Decreased Transfer Abilities  4. Increased Pain  5. Increased Fatigue  6. Decreased Flexibility/Joint Mobility  7. Decreased Knowledge of Precautions   INTERVENTIONS PLANNED: (Benefits and precautions of occupational therapy have been discussed with the patient.)  1. Activities of daily living training  2. Adaptive equipment training  3. Balance training  4. Clothing management  5. Donning&doffing training  6. Theraputic activity     TREATMENT PLAN: Frequency/Duration: Follow patient 1-2tx to address above goals. Rehabilitation Potential For Stated Goals: Excellent     RECOMMENDED REHABILITATION/EQUIPMENT: (at time of discharge pending progress): Continue Skilled Therapy. OCCUPATIONAL PROFILE AND HISTORY:   History of Present Injury/Illness (Reason for Referral): Pt presents this date s/p (left) TKA. Past Medical History/Comorbidities:   Ms. 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  She also has no past medical history of Adverse effect of anesthesia, Aneurysm ([address] Arrhythmia, Asthma, Autoimmune disease ([address] CAD (coronary artery disease), Cancer ([address] Chronic kidney disease, Chronic obstructive pulmonary disease ([address] Chronic pain, Coagulation disorder ([address] Diabetes ([address] Difficult intubation, Endocarditis, GERD (gastroesophageal reflux disease), Heart failure ([address] Ill-defined condition, Liver disease, Malignant hyperthermia due to anesthesia, Morbid obesity ([address] Nausea & vomiting, Nicotine vapor product user, Non-nicotine vapor product user, Pseudocholinesterase deficiency, PUD (peptic ulcer disease), Rheumatic fever, Seizures ([address] Sleep apnea, Stroke [facility]), Thromboembolus [facility]), or Thyroid disease. Ms. Kofi Lozada  has a past surgical history that includes hx knee arthroscopy (Right); hx colonoscopy; hx knee replacement (Right, 01/24/2018); and hx cataract removal (Bilateral, 2018). Social History/Living Environment:   Home Environment: Private residence  Patient Expects to be Discharged to[de-identified] Rehabilitation facility  Current DME Used/Available at Home: Cane, straight  Prior Level of Function/Work/Activity:  Independent prior. 1/18 had right TKA. Number of Personal Factors/Comorbidities that affect the Plan of Care: Brief history (0):  LOW COMPLEXITY   ASSESSMENT OF OCCUPATIONAL PERFORMANCE[de-identified]   Most Recent Physical Functioning:   Balance  Sitting: Intact  Standing: Pull to stand; With support       Gross Assessment: Yes  Gross Assessment  AROM: Within functional limits(right LE)  Strength: Generally decreased, functional(right LE)          LLE Assessment  LLE Assessment (WDL): Exception to WDL  LLE AROM  L Knee Flexion: 60  L Knee Extension: 10 Coordination  Fine Motor Skills-Upper: Left Intact; Right Intact  Gross Motor Skills-Upper: Left Intact; Right Intact         Mental Status  Neurologic State: Alert  Orientation Level: Oriented X4  Cognition: Appropriate decision making  Perception: Appears intact                Basic ADLs (From Assessment) Complex ADLs (From Assessment)   Basic ADL  Feeding: Independent  Oral Facial Hygiene/Grooming: Setup  Bathing: Minimum assistance  Upper Body Dressing: Setup  Lower Body Dressing: Moderate assistance  Toileting: Minimum assistance     Grooming/Bathing/Dressing Activities of Daily Living                       Functional Transfers  Bathroom Mobility: Minimum assistance  Toilet Transfer : Minimum assistance  Shower Transfer:  Moderate assistance     Bed/Mat Mobility  Supine to Sit: Contact guard assistance  Sit to Stand: Contact guard assistance;Minimum assistance  Bed to Chair: Contact guard assistance;Minimum assistance         Physical Skills Involved:  1. Range of Motion  2. Balance  3. Strength  4. Activity Tolerance Cognitive Skills Affected (resulting in the inability to perform in a timely and safe manner): 1. wfl Psychosocial Skills Affected: 1. wfl   Number of elements that affect the Plan of Care: 1-3:  LOW COMPLEXITY   CLINICAL DECISION MAKIN[address] [place] [address] AM-PAC 6 Clicks   Daily Activity Inpatient Short Form  How much help from another person does the patient currently need. .. Total A Lot A Little None   1. Putting on and taking off regular lower body clothing? [] 1   [x] 2   [] 3   [] 4   2. Bathing (including washing, rinsing, drying)? [] 1   [x] 2   [] 3   [] 4   3. Toileting, which includes using toilet, bedpan or urinal?   [] 1   [x] 2   [] 3   [] 4   4. Putting on and taking off regular upper body clothing? [] 1   [] 2   [] 3   [x] 4   5. Taking care of personal grooming such as brushing teeth? [] 1   [] 2   [] 3   [x] 4   6. Eating meals? [] 1   [] 2   [] 3   [x] 4   © , Trustees of [address] [place] [address], under license to [employer] Vas[employer]. All rights reserved     Score:  Initial: 18 Most Recent: X (Date: -- )    Interpretation of Tool:  Represents activities that are increasingly more difficult (i.e. Bed mobility, Transfers, Gait). Medical Necessity:     · Skilled intervention continues to be required due to deficits listed above. Reason for Services/Other Comments:  · Patient continues to require skilled intervention due to new TKA.    Use of outcome tool(s) and clinical judgement create a POC that gives a: MODERATE COMPLEXITY            TREATMENT:   (In addition to Assessment/Re-Assessment sessions the following treatments were rendered)     Pre-treatment Symptoms/Complaints:    Pain: Initial:   Pain Intensity 1: 0  Post Session:  0     Assessment/Reassessment only, no treatment provided today    Treatment/Session Assessment:     Response to Treatment:  Good, sitting up in recliner. Education:  [] Home Exercises  [x] Fall Precautions  [] Hip Precautions [] Going Home Video  [x] Knee/Hip Prosthesis Review  [x] Walker Management/Safety [x] Adaptive Equipment as Needed       Interdisciplinary Collaboration:   o Physical Therapist  o Occupational Therapist  o Registered Nurse    After treatment position/precautions:   o Up in chair  o Bed/Chair-wheels locked  o Caregiver at bedside  o Call light within reach  o RN notified     Compliance with Program/Exercises: Compliant all of the time. Recommendations/Intent for next treatment session:  Treatment next visit will focus on increasing Ms. White's independence with bed mobility, transfers, self care, functional mobility, modalities for pain, and patient education.       Total Treatment Duration:  OT Patient Time In/Time Out  Time In: 1440  Time Out: [address]

## 2019-02-04 NOTE — ANESTHESIA PROCEDURE NOTES
Spinal Block Start time: 2/4/2019 10:38 AM 
End time: 2/4/2019 10:46 AM 
Performed by: Arlene Mckeon MD 
Authorized by: Arlene Mckeon MD  
 
Pre-procedure: Indications: primary anesthetic  Preanesthetic Checklist: patient identified, risks and benefits discussed, anesthesia consent, site marked, patient being monitored and timeout performed Timeout Time: 10:38 Spinal Block:  
Patient Position:  Seated Prep: chlorhexidine and patient draped Location:  L2-3 Technique:  Single shot Needle:  
Needle Type:  Pencil-tip Needle Gauge:  25 G Attempts:  3 or more Events: CSF confirmed, no blood with aspiration and no paresthesia Assessment: 
Insertion:  Complicated Patient tolerance:  Patient tolerated the procedure well with no immediate complications Failed attempts midline and paramedian at L3-4 and L4-5.  Success at midline L2-3

## 2019-02-04 NOTE — ANESTHESIA PROCEDURE NOTES
Peripheral Block Start time: 2/4/2019 9:40 AM 
End time: 2/4/2019 9:43 AM 
Performed by: Elsy Hatch MD 
Authorized by: Elsy Hatch MD  
 
 
Pre-procedure: Indications: at surgeon's request and post-op pain management Preanesthetic Checklist: patient identified, risks and benefits discussed, site marked, timeout performed, anesthesia consent given and patient being monitored Timeout Time: 09:40 Block Type:  
Block Type: Adductor canal 
Laterality:  Left Monitoring:  Standard ASA monitoring, continuous pulse ox, frequent vital sign checks, heart rate, oxygen and responsive to questions Injection Technique:  Single shot Procedures: ultrasound guided Patient Position: supine Prep: chlorhexidine Location:  Mid thigh Needle Type:  Stimuplex Needle Gauge:  21 G Needle Localization:  Anatomical landmarks and ultrasound guidance Assessment: 
Number of attempts:  1 Injection Assessment:  Incremental injection every 5 mL, local visualized surrounding nerve on ultrasound, negative aspiration for blood, no intravascular symptoms, no paresthesia and ultrasound image on chart Patient tolerance:  Patient tolerated the procedure well with no immediate complications

## 2019-02-04 NOTE — PERIOP NOTES
Betadine lavage:  17.5cc of betadine lot # [id number], exp. Date 06/2020,  in 500cc of . 9NS Lot # [id number], exp.  Date : 11/01/2021

## 2019-02-04 NOTE — PERIOP NOTES
TRANSFER - OUT REPORT:    Verbal report given to ALEIDA RN(name) on Erica Vincent  being transferred to PRE-OP(unit) for routine progression of care       Report consisted of patients Situation, Background, Assessment and   Recommendations(SBAR). Information from the following report(s) SBAR, MAR and Recent Results was reviewed with the receiving nurse. Lines:   Peripheral IV 02/04/19 Left Forearm (Active)   Site Assessment Clean, dry, & intact 2/4/2019  8:11 AM   Phlebitis Assessment 0 2/4/2019  8:11 AM   Infiltration Assessment 0 2/4/2019  8:11 AM   Dressing Status Clean, dry, & intact 2/4/2019  8:11 AM   Dressing Type Tape;Transparent 2/4/2019  8:11 AM   Hub Color/Line Status Pink; Infusing 2/4/2019  8:11 AM   Action Taken Blood drawn 2/4/2019  8:11 AM        Opportunity for questions and clarification was provided.       Patient transported with:   [employer]

## 2019-02-04 NOTE — ANESTHESIA POSTPROCEDURE EVALUATION
Procedure(s): LEFT KNEE ARTHROPLASTY TOTAL. Anesthesia Post Evaluation Multimodal analgesia: multimodal analgesia not used between 6 hours prior to anesthesia start to PACU discharge Patient location during evaluation: PACU Patient participation: complete - patient participated Level of consciousness: awake Pain management: adequate Airway patency: patent Anesthetic complications: no 
Cardiovascular status: acceptable Respiratory status: acceptable Hydration status: acceptable Post anesthesia nausea and vomiting:  none Visit Vitals /58 (BP 1 Location: Right arm, BP Patient Position: At rest) Pulse 66 Temp 36.7 °C (98 °F) Resp 14 Ht 5' 4\" (1.626 m) Wt 60.3 kg (133 lb) SpO2 91% BMI 22.83 kg/m²

## 2019-02-04 NOTE — PERIOP NOTES
TRANSFER - IN REPORT:    Verbal report received from [facility], RN (name) on Joshua Padron  being received from [facility]  (unit) for routine progression of care      Report consisted of patients Situation, Background, Assessment and   Recommendations(SBAR). Information from the following report(s) SBAR, Kardex, Intake/Output, MAR and Recent Results was reviewed with the receiving nurse. Opportunity for questions and clarification was provided.

## 2019-02-04 NOTE — OP NOTES
[facility]  Cementless Total Knee Arthroplasty: Posterior Cruciate Retaining     Patient:Jennifer White   : 1935  Medical Record [id number]  Pre-operative Diagnosis:  Primary osteoarthritis of left knee [M17.12]  Post-operative Diagnosis: Osteoarthritis of left knee  Location: [address] [place] [address]  Surgeon: Johnna Keller MD   Assistant: Duanne Spatz    Anesthesia: Spinal and FNB    Procedure:Procedure(s) (LRB):  LEFT KNEE ARTHROPLASTY TOTAL (Left)   The complexity of the total joint surgery requires the use of a first assistant for positioning, retraction and expertise in closure. Tourniquet Time: 0 minutes  EBL: 250 cc  Findings: severe degenerative arthritis, patellar osteophytes, posterior femoral osteophytes   BMI: Body mass index is 22.83 kg/m². Alexandrea Henderson was brought to the operating room and positioned on the operating table. She was anesthestized with anesthesia. A dunham catheter was placed preoperatively and IV antibiotics was administered. Prior to the incision being made a timeout was called identifying the patient, procedure ,operative side and surgeon The operative leg was prepped and draped in the usual sterile manner. An anterior longitudinal incision was accomplished just medial to the tibial tubercle and extending approximal 6 centimeters proximal to the superior pole of the patella. A medial parapatellar capsular incision was performed. The medial capsular flap was elevated around to the insertion of the semimembranous tendon. The patella was everted and the knee flexed and externally rotated. The medial and external menisci were excised. The lateral half of the fat pad excised and the patella femoral ligament was released. The anterior cruciate ligament was resect and the posterior cruciate ligament was retained. Using extramedullary instrumentation, the tibial cut was accomplished with appropriate posterior slope.       The distal femur was addressed. A drill hole was made above the intracondylar notch. Using appropriate intramedullary instrumentation,a five degree valgus distal cut was accomplished. The femur was sized. The anterior and posterior cuts were then made about the distal femur. The osteophytes were removed from the tibial and femoral surfaces. The flexion and extension gaps were assessed with the appropriate spacer blocks. Additional surgical procedures included: none. The flexion and extension gaps were deemed appropriately balanced. The appropriate cutting blocks were then utilized to perform the anterior, posterior and chamfer cuts, with appropriate lateral translation accomplished for the patellofemoral groove. Approximately 9 mm of bone was removed from the high side of the tibia. The tibia was sized. .  The tibial base plate was pinned into place with the appropriate external rotation and stem site prepared. A preliminary range of motion was accomplished. The  Patient was found to obtain full extension as well as appropriate flexion. The patient's ligaments were stable in flexion and extension to medial and lateral stressing and the alignment was through the appropriate mechanical axis. The patella was then everted. The bone was resect to accommodate the three peg patella button. A trial reduction revealed appropriate tracking through the patellofemoral groove with no lateral retinacular release being accomplished. All trial components were removed. The real implants were opened: Sizes listed below. The knee was irrigated. There were no femoral deficiencies. There were no tibial deficiencies. No augmentation was utilized. The permanent cementless Tibial and Femoral components were impacted into place. The cementless  patella component was then pressed in place. [facility] knee was placed through range of motion and noted to be stable as mentioned above with the trail components.   The wound was dry, therefore no drain was used. The operative knee was injected with 60 cc of Naropin, 10 cc's of morphine and 1 cc of 30 mg of Toradol. The knee was then soaked with a diluted betadine solution for approximately 3 min. This was then thoroughly irrigated. The capsular layer was closed using a #1 PDS suture. Then, 1 gram (100 mg/ml) of Transexamic Acid was injected into the joint space. The subcutaneous layers were closed using 2-0 Stratafix. Finally the skin was closed using 3-0 Vicryl and skin staples, which were applied in occlusive fashion and sterile bandage applied. An Iceman cryo pad was applied on the operative leg. Sponge count and needle counts were correct. Vin Boucher left the operating room     Implants:   Implant Name Type Inv.  Item Serial No.  Lot No. LRB No. Used   COMPNT FEM CR TRIATHLN 3 L PA --  - [id number]  COMPNT FEM CR TRIATHLN 3 L PA --  [id number] [employer] HOW [id number] Left 1   BASEPLT TIB PC TRITNM SZ 4 -- TRIATHLON - [id number]  BASEPLT TIB PC TRITNM SZ 4 -- TRIATHLON [id number] [employer] HOW [id number] Left 1   PAT ASYM MTL-BK 10MM SZ A35 -- TRIATHLON -   PAT ASYM MTL-BK 10MM SZ A35 -- TRIATHLON [id number] [employer] HOW [id number] Left 1   INSERT TIB CR TRIATHLN 4 9MM --  - [id number]  INSERT TIB CR TRIATHLN 4 9MM --  [id number] [employer] HOW [id number] Left 1   INSERT TIB CR TRIATHLN 4 11MM --  - [id number]  INSERT TIB CR TRIATHLN 4 11MM --  [id number] [employer] HOW [id number] Left 1         Signed By: Erika Hayward MD   2/4/2019,  12:25 PM

## 2019-02-04 NOTE — CONSULTS
INTERNAL MEDICINE H&P/CONSULT    Subjective:     Patient is a 82year old female with history of OA and R knee replacement who had a L knee replacement today- patient is stable postoperatively. Pain is tolerable. No Chest pain, cough, shortness of breath or palpitations. Afebrile. Past Medical History:   Diagnosis Date    Arthritis     osteo    Hypertension     on med for control     Insomnia     Murmur     Murmur (Soft VIOLETTA RUSB, Soft TR murmur) heard per cardiology office note dated 05/22/18 (Dr. Sharp )    Psychiatric disorder     anxiety- citalopram daily    Status post total knee replacement, left 2/4/2019    Status post total right knee replacement 1/24/2018      Past Surgical History:   Procedure Laterality Date    HX CATARACT REMOVAL Bilateral 2018    HX COLONOSCOPY      HX KNEE ARTHROSCOPY Right     HX KNEE REPLACEMENT Right 01/24/2018      Prior to Admission medications    Medication Sig Start Date End Date Taking? Authorizing Provider   HYDROmorphone (DILAUDID) 2 mg tablet Take 1 Tab by mouth every four (4) hours as needed for Pain. Max Daily Amount: 12 mg. 2/4/19  Yes JACEK Cadena   aspirin delayed-release 81 mg tablet Take 1 Tab by mouth every twelve (12) hours for 35 days. 2/4/19 3/11/19 Yes JACEK Cadena   vitamin E (AQUA GEMS) 400 unit capsule Take 400 Units by mouth daily. Yes Provider, Historical   omega 3-dha-epa-fish oil (FISH OIL) 100-160-1,000 mg cap Take 1 Cap by mouth Three (3) times a week. Yes Provider, Historical   ZINC ACETATE PO Take 1 Tab by mouth daily. Yes Provider, Historical   aspirin delayed-release 81 mg tablet Take 81 mg by mouth nightly. Yes Provider, Historical   metoprolol succinate (TOPROL-XL) 25 mg XL tablet Take 1 Tab by mouth nightly. Indications: hypertension 5/22/18  Yes Mela Schroeder MD   amLODIPine (NORVASC) 2.5 mg tablet Take 1 Tab by mouth daily. Patient taking differently: Take 2.5 mg by mouth nightly.  5/22/18  Yes Antonio Becerra MD   losartan-hydroCHLOROthiazide [facility]) 100-25 mg per tablet Take 1 Tab by mouth daily. 5/22/18  Yes Shayna Schroeder MD   multivitamin (ONE A DAY) tablet Take 1 Tab by mouth daily. Yes Provider, Historical   calcium-cholecalciferol, d3, (CALCIUM 600 + D) 600-125 mg-unit tab Take 1 Tab by mouth daily. Yes Provider, Historical   temazepam (RESTORIL) 15 mg capsule TAKE ONE CAPSULE BY MOUTH AT BEDTIME,as needed 10/31/17  Yes Provider, Historical   citalopram (CELEXA) 10 mg tablet Take 10 mg by mouth daily. Per anesthesia protocol:instructed to take am of surgery. Indications: ANXIETY WITH DEPRESSION   Yes Provider, Historical     Allergies   Allergen Reactions    Hydrocodone Unknown (comments)     \"too strong\"    Penicillins Itching    Sulfa (Sulfonamide Antibiotics) Itching      Social History     Tobacco Use    Smoking status: Never Smoker    Smokeless tobacco: Never Used   Substance Use Topics    Alcohol use: Yes     Alcohol/week: 1.2 oz     Types: 2 Glasses of wine per week        Family History:  HTN    Review of Systems   A comprehensive review of systems was negative except for that written in the HPI. Objective: Intake / Output:  02/04 0701 - 02/04 1900  In: 1200 [I.V.:1200]  Out: 725 [Urine:475]  No intake/output data recorded. Physical Exam:  Visit Vitals  /65 (BP 1 Location: Right arm, BP Patient Position: At rest)   Pulse 87   Temp 97.6 °F (36.4 °C)   Resp 16   Ht 5' 4\" (1.626 m)   Wt 60.3 kg (133 lb)   SpO2 97%   BMI 22.83 kg/m²     General appearance: awake, alert, cooperative, mild to moderate distress, appears stated age. Obese. Head: Normocephalic, without obvious abnormality, atraumatic  Back: symmetric, no curvature. ROM normal. No CVA tenderness. Lungs: clear to auscultation bilaterally  Heart: regular rate and rhythm, S1, S2 normal, no murmur, click, rub or gallop.  -   Abdomen: soft, no tenderness, no distension, normal bowel sound, no masses, no organomegaly  Extremities: atraumatic, no cyanosis. Bilateral lower limbs edema none. Leg immobilized, SCDs bilateral.  Skin: No rashes or ulceration. 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 Pain is controlled. Anticoagualtion and Ambulation per orthopedics. Am lab ordered. Thank you for the opportunity to contribute in the care of your patient. Will follow the patient with you as needed.     Signed By: Ansley Feliciano MD     February 4, 2019

## 2019-02-04 NOTE — PERIOP NOTES
Teach back method used in review of Hibiclens usage preop/postop, TB screening, pain management goals, falls precautions and use of Nozin for prevention of staph infections. Incentive spirometer reviewed and pt reached TOTAL TIDAL VOLUME 750 ML OBSERVED   in preop holding.

## 2019-02-04 NOTE — CONSULTS
Physical Medicine & Rehabilitation Note-consult    Patient: Cheyenne June MRN: [id number]  SSN: [id number]    YOB: 1935  Age: 82 y.o. Sex: female      Admit Date: 2/4/2019  Admitting Physician: Tayler Humphreys MD    Medical Decision Making/Plan/Recommend:  Gait impairments/p L TKA. PT /OT progressing without  unusual barriers to progress. Patient is still limited by pain, decreased strength. Continue PT, OT for active/assisted/passive left TKA ROM, strengthening, mobility, transfers, gait training. Plan for SNF discharge/ sub acute skilled rehab. .       Chief Complaint : Gait dysfunction secondary to below. Admit Diagnosis: Primary osteoarthritis of left knee [M17.12]  left total knee arthroplasty 2/4/2019  Pain  DVT risk  Post op acute blood loss anemia  Osteoarthritis   Hypertension   R TKA 1/24/2018  Acute Rehab Dx:  Gait impairment  Mobility and ambulation deficits  Self Care/ADL deficits    Medical Dx:  Past Medical History:   Diagnosis Date    Arthritis     osteo    Hypertension     on med for control     Insomnia     Murmur     Murmur (Soft VIOLETTA RUSB, Soft TR murmur) heard per cardiology office note dated 05/22/18 (Dr. Álvaro Cason)    Psychiatric disorder     anxiety- citalopram daily    Status post total knee replacement, left 2/4/2019    Status post total right knee replacement 1/24/2018     Subjective:     HPI: Cheyenne June is a 82 y.o. female patient at [facility] who was admitted on 2/4/2019  by Tayler Humphreys MD with below mentioned medical history, is being seen for Physical Medicine and Rehabilitation consult. Cheyenne June presented with severe left knee pain due to end stage DJD. Patient underwent a left total knee arthroplasty per Dr. aTyler Humphreys MD on 2/4/2019. The patient's post operative course has been uncomplicated medically. Patient is to be WBAT LLE.  Patient is starting to stand, taking steps working with acute PT and OT. Patient is making expected gains with ambulation, mobility, and ROM. Patient shows significant functional deficits, gait dysfunciton due to knee pain, decreased ROM and strength. Patient had R TKA in 1/2018 with good result. Patient has been independent with ambulation, prior to admission. Current Level of Function:  bed mobility - CGA, transfers - min A, decreased balance , ambulation - 15' with RW and min A. Previous Functional Level-   independent      Family History   Problem Relation Age of Onset    Cancer Mother     Heart Disease Father       Social History     Tobacco Use    Smoking status: Never Smoker    Smokeless tobacco: Never Used   Substance Use Topics    Alcohol use: Yes     Alcohol/week: 1.2 oz     Types: 2 Glasses of wine per week     Past Surgical History:   Procedure Laterality Date    HX CATARACT REMOVAL Bilateral 2018    HX COLONOSCOPY      HX KNEE ARTHROSCOPY Right     HX KNEE REPLACEMENT Right 01/24/2018      Prior to Admission medications    Medication Sig Start Date End Date Taking? Authorizing Provider   HYDROmorphone (DILAUDID) 2 mg tablet Take 1 Tab by mouth every four (4) hours as needed for Pain. Max Daily Amount: 12 mg. 2/4/19  Yes JACEK Salvador   aspirin delayed-release 81 mg tablet Take 1 Tab by mouth every twelve (12) hours for 35 days. 2/4/19 3/11/19 Yes JACEK Salvador   vitamin E (AQUA GEMS) 400 unit capsule Take 400 Units by mouth daily. Yes Provider, Historical   omega 3-dha-epa-fish oil (FISH OIL) 100-160-1,000 mg cap Take 1 Cap by mouth Three (3) times a week. Yes Provider, Historical   ZINC ACETATE PO Take 1 Tab by mouth daily. Yes Provider, Historical   aspirin delayed-release 81 mg tablet Take 81 mg by mouth nightly. Yes Provider, Historical   metoprolol succinate (TOPROL-XL) 25 mg XL tablet Take 1 Tab by mouth nightly.  Indications: hypertension 5/22/18  Yes Jennifer Schroeder MD   amLODIPine (NORVASC) 2.5 mg tablet Take 1 Tab by mouth daily. Patient taking differently: Take 2.5 mg by mouth nightly. 18  Yes Jagjit Schroeder MD   losartan-hydroCHLOROthiazide [facility]) 100-25 mg per tablet Take 1 Tab by mouth daily. 18  Yes Jagjit Schroeder MD   multivitamin (ONE A DAY) tablet Take 1 Tab by mouth daily. Yes Provider, Historical   calcium-cholecalciferol, d3, (CALCIUM 600 + D) 600-125 mg-unit tab Take 1 Tab by mouth daily. Yes Provider, Historical   temazepam (RESTORIL) 15 mg capsule TAKE ONE CAPSULE BY MOUTH AT BEDTIME,as needed 10/31/17  Yes Provider, Historical   citalopram (CELEXA) 10 mg tablet Take 10 mg by mouth daily. Per anesthesia protocol:instructed to take am of surgery. Indications: ANXIETY WITH DEPRESSION   Yes Provider, Historical     Allergies   Allergen Reactions    Hydrocodone Unknown (comments)     \"too strong\"    Penicillins Itching    Sulfa (Sulfonamide Antibiotics) Itching        Review of Systems: +left knee pain, +antalgic gait. Denies chest pain, shortness of breath, cough, headache, visual problems, abdominal pain, dysurea, calf pain. Pertinent positives are as noted in the medical records and unremarkable otherwise. Objective:     Vitals:  Blood pressure 130/65, pulse 87, temperature 97.6 °F (36.4 °C), resp. rate 16, height 5' 4\" (1.626 m), weight 133 lb (60.3 kg), SpO2 97 %. Temp (24hrs), Av.9 °F (36.6 °C), Min:97.6 °F (36.4 °C), Max:98.2 °F (36.8 °C)    BMI (calculated): 22.8 (19 0800)   Intake and Output:  No intake/output data recorded. Physical Exam:   General: Alert and age appropriately oriented. No acute cardio respiratory distress. HEENT: Normocephalic, no conjunctival pallor. Oral mucosa moist without cyanosis. No JVD. Lungs: Clear to auscultation bilaterally. Respiration even and unlabored   Heart: Regular rate and rhythm, S1, S2   No  Murmurs. Abdomen: Soft, non-tender, non-distended.     Genitourinary: defered   Neuromuscular: Grossly no focal motor deficits. Left knee extension strong  Left ankle dorsiflexion 5/5  Left ankle plantarflexion 5/5  No sensory deficits distally BLE to soft touch. Skin/extremity: Non tender calves BLE. No rashes, no marginal erythema. Labs/Studies:  Recent Results (from the past 72 hour(s))   TYPE & SCREEN    Collection Time: 02/04/19  8:19 AM   Result Value Ref Range    Crossmatch Expiration 02/07/2019     ABO/Rh(D) A NEGATIVE     Antibody screen NEG    GLUCOSE, POC    Collection Time: 02/04/19  8:32 AM   Result Value Ref Range    Glucose (POC) 90 65 - 100 mg/dL       Functional Assessment:  Reviewed participation and progress in therapies  Gross Assessment  AROM: Within functional limits(right LE) (02/04/19 1500)  Strength: Generally decreased, functional(right LE) (02/04/19 1500)   Bed Mobility  Supine to Sit: Contact guard assistance (02/04/19 1500)   Balance  Sitting: Intact (02/04/19 1500)  Standing: Pull to stand; With support (02/04/19 1500)               Bed/Mat Mobility  Supine to Sit: Contact guard assistance (02/04/19 1500)  Sit to Stand: Contact guard assistance;Minimum assistance (02/04/19 1500)  Bed to Chair: Contact guard assistance;Minimum assistance (02/04/19 1500)     Ambulation:  Gait  Speed/Joseline: Delayed (02/04/19 1500)  Step Length: Left shortened;Right shortened (02/04/19 1500)  Stance: Left decreased (02/04/19 1500)  Gait Abnormalities: Antalgic;Decreased step clearance (02/04/19 1500)  Ambulation - Level of Assistance: Minimal assistance;Contact guard assistance (02/04/19 1500)  Distance (ft): 15 Feet (ft) (02/04/19 1500)  Assistive Device: Walker, rolling (02/04/19 1500)  Interventions: Safety awareness training;Verbal cues (02/04/19 1500)    Impression/Plan:     Principal Problem:    Status post total knee replacement, left (2/4/2019)    Active Problems:    Osteoarthritis of left knee (2/4/2019)        Current Facility-Administered Medications   Medication Dose Route Frequency Provider Last Rate Last Dose    tuberculin injection 5 Units  5 Units IntraDERMal ONCE Tammie Perla MD   5 Units at 02/04/19 0825    amLODIPine (NORVASC) tablet 2.5 mg  2.5 mg Oral QHS JACEK Oneil        [START ON 2/5/2019] citalopram (CELEXA) tablet 10 mg  10 mg Oral DAILY Antwan Oneil        metoprolol succinate (TOPROL-XL) XL tablet 25 mg  25 mg Oral QHS JACEK Oneil        temazepam (RESTORIL) capsule 15 mg  15 mg Oral QHS PRN JACEK Oneil        alcohol 62% (NOZIN) nasal  1 Ampule  1 Ampule Topical Q12H JACEK Oneil        0.9% sodium chloride infusion  100 mL/hr IntraVENous CONTINUOUS JACEK Oneil        sodium chloride (NS) flush 5-40 mL  5-40 mL IntraVENous Q8H Indu JACEK Ortiz        sodium chloride (NS) flush 5-40 mL  5-40 mL IntraVENous PRN JACEK Oneil        ceFAZolin (ANCEF) 2 g/20 mL in sterile water IV syringe  2 g IntraVENous Q8H JACEK Oneil        acetaminophen (OFIRMEV) infusion 1,000 mg  1,000 mg IntraVENous KATH Pickett [place]        [START ON 2/5/2019] acetaminophen (TYLENOL) tablet 1,000 mg  1,000 mg Oral Q6H Indu JACEK Ortiz        HYDROmorphone (DILAUDID) tablet 2 mg  2 mg Oral Q4H PRN JACEK Oneil        HYDROmorphone (PF) (DILAUDID) injection 1 mg  1 mg IntraVENous Q3H PRN JACEK Oneil        naloxone [facility]) injection 0.2-0.4 mg  0.2-0.4 mg IntraVENous Q10MIN PRN JACEK Oneil        [START ON 2/5/2019] dexamethasone (DECADRON) injection 10 mg  10 mg IntraVENous [place]        ondansetron [facility]) injection 4 mg  4 mg IntraVENous Q4H PRN JACEK Oneil        diphenhydrAMINE (BENADRYL) capsule 25 mg  25 mg Oral Q4H PRN JACEK Oneil        [START ON 2/5/2019] senna-docusate (PERICOLACE) 8.6-50 mg per tablet 2 Tab  2 Tab Oral DAILY Ginny Ace, [place]        aspirin delayed-release tablet 81 mg  81 mg Oral Q12H Antwan Gross Ace        [START ON 2/5/2019] losartan/hydroCHLOROthiazide (HYZAAR) 100/25 mg   Oral DAILY Lucille Rivera MD        [START ON 2/5/2019] losartan/hydroCHLOROthiazide (HYZAAR) 100/25 mg   Oral DAILY Lucille Rivera MD            Recommendations: Recommend SNF, sub acute rehab,   Continue Acute Rehab Program. PT, OT  to focus on  gait training, transfer training, balance activities, ROM and strengthening exercises. Coordination of rehab/medical care. Counseling of Physical Medicine & Rehab care issues management. Rehabilitation Management/ Medical Management: 1. Devices:Walkers, Type: Rolling Walker  2. Consult:Rehab team including PT, OT,  and . 3. Disposition Rehab-discussed with patient. 4. Thigh-high or knee-high ORLY's when out of bed. 5. DVT Prophylaxis - start aspirin 81 mg bid x 35days. 6. Incentive spirometer Q1H while awake  7. Post op hemorrhagic anemia- monitor. 8. Activity: WBAT LLE  9. Planned Labs: CBC,BMP  10. Pain Control:   Start scheduled tylenol, Celebrex and  PRN meds. 11. Wound Care: May remove Aquacel 1 week post op ad replace with new one. Remove staples 12-14 post surgery, when incision appears appropriately closed and apply benzoin and 1/2\" steristrips. Follow up with ORTHO per instructions. Thank you for the opportunity to participate in the care of this patient.     Signed By: Jody Tafoya MD

## 2019-02-04 NOTE — PROGRESS NOTES
Spiritual Care visit. Initial Visit, Pre Surgery Consult. Visit and prayer before patient goes to surgery.     Visit by Diamond Hernandes M.Ed., Th.B. ,Staff

## 2019-02-04 NOTE — DISCHARGE INSTRUCTIONS
[address] [facility]   Patient Discharge Instructions    Sadaf Goode / [id number] : 1935    Admitted 2019 Discharged: 2019     IF YOU HAVE ANY PROBLEMS ONCE YOU ARE AT HOME CALL THE FOLLOWING NUMBERS:   Main office number: [phone]      Medications    · The medications you are to continue on are listed on the medication reconciliation sheet. · Narcotic pain medications as well as supplemental iron can cause constipation. If this occurs try stopping the narcotic pain medication and/or the iron. · It is important that you take the medication exactly as they are prescribed. · Medications which increase your risk of blood clots are listed to stop for 5 weeks after surgery as well as medications or supplements which increase your risk of bleeding complications. · Keep your medication in the bottles provided by the pharmacist and keep a list of the medication names, dosages, and times to be taken in your wallet. · Do not take other medications without consulting your doctor. Important Information    Do NOT smoke as this will greatly increase your risk of infection! Resume your prehospital diet. If you have excessive nausea or vomitting call your doctor's office     Leg swelling and warmth is normal for 6 months after surgery. If you experience swelling in your leg elevate you leg while laying down with your toes above your heart. If you have sudden onset severe swelling with leg pain call our office. Use Vinicio Hose stockings until we see you in the office for your follow up appointment. The stitches deep inside take approximately 6 months to dissolve. There will be sharp shooting, stinging and burning pain. This is normal and will resolve between 3-6 months after surgery. Difficulty sleeping is normal following total Knee and Hip replacement. You may try melatonin, an over-the-counter sleep aid or benadryl to help with sleep.  Most patients will resume sleeping through the night 8 weeks after surgery. Home Physical Therapy is arranged. Home Health will contact you within 48 hrs of discharge that you have chosen. If you have not received a call within this time frame please contact that provider you chose. You should be given this information before you leave the hospital.     You are at a risk for falls. Use the rolling walker when walking. Patients who have had a joint replacement should not drive if they are still taking narcotic pain mediation during the daytime hours. Most patients wean themselves off of pain medication within 2-5 weeks after surgery. When to Call the office    - If you have a temperature greater then 101  - Uncontrolled vomiting   - Loose control of your bladder or bowel function  - Are unable to bear any wieght   - Need a pain medication refill     Information obtained by :  I understand that if any problems occur once I am at home I am to contact my physician. I understand and acknowledge receipt of the instructions indicated above.                                                                                                                                            Physician's or R.N.'s Signature                                                                  Date/Time                                                                                                                                              Patient or Representative Signature                                                          Date/Time

## 2019-02-04 NOTE — PROGRESS NOTES
Assessment complete. Pt alert and oriented X4. Dorsiflexion and plantar flexion are present bilaterally. Pedal pule  No signs of distress noted. Oriented pt to room, unit, dining on call, IS, and pain management. Pt on 1 liter oxygen via nasal cannula. Lungs clear to auscultation. [place] to drainage with no loops in tubing, and stat lock in place. Yellow/straw color urine noted. IV intact with no redness, or swelling noted infusing. Incision to left knee is covered with Aquacel dressing. Ice applied and plexi-pulses on. Pt denies pain. Call light within reach and bed in low position and locked. Pt informed to call out for needs verbalizes understanding. Will continue to monitor through hourly rounding. Family at bedside.

## 2019-02-05 LAB
ANION GAP SERPL CALC-SCNC: 3 MMOL/L
BUN SERPL-MCNC: 19 MG/DL (ref 8–23)
CALCIUM SERPL-MCNC: 8 MG/DL (ref 8.3–10.4)
CHLORIDE SERPL-SCNC: 105 MMOL/L (ref 98–107)
CO2 SERPL-SCNC: 30 MMOL/L (ref 21–32)
CREAT SERPL-MCNC: 0.7 MG/DL (ref 0.6–1)
GLUCOSE SERPL-MCNC: 115 MG/DL (ref 65–100)
HGB BLD-MCNC: 8.9 G/DL (ref 11.7–15.4)
MM INDURATION POC: 0 MM (ref 0–5)
POTASSIUM SERPL-SCNC: 3.9 MMOL/L (ref 3.5–5.1)
PPD POC: NORMAL NEGATIVE
SODIUM SERPL-SCNC: 138 MMOL/L (ref 136–145)

## 2019-02-05 PROCEDURE — 80048 BASIC METABOLIC PNL TOTAL CA: CPT

## 2019-02-05 PROCEDURE — 74011250637 HC RX REV CODE- 250/637: Performed by: ORTHOPAEDIC SURGERY

## 2019-02-05 PROCEDURE — 97116 GAIT TRAINING THERAPY: CPT

## 2019-02-05 PROCEDURE — 65270000029 HC RM PRIVATE

## 2019-02-05 PROCEDURE — 97150 GROUP THERAPEUTIC PROCEDURES: CPT

## 2019-02-05 PROCEDURE — 97110 THERAPEUTIC EXERCISES: CPT

## 2019-02-05 PROCEDURE — 74011250637 HC RX REV CODE- 250/637: Performed by: PHYSICIAN ASSISTANT

## 2019-02-05 PROCEDURE — 94760 N-INVAS EAR/PLS OXIMETRY 1: CPT

## 2019-02-05 PROCEDURE — 36415 COLL VENOUS BLD VENIPUNCTURE: CPT

## 2019-02-05 PROCEDURE — 94762 N-INVAS EAR/PLS OXIMTRY CONT: CPT

## 2019-02-05 PROCEDURE — 74011250636 HC RX REV CODE- 250/636: Performed by: PHYSICIAN ASSISTANT

## 2019-02-05 PROCEDURE — 85018 HEMOGLOBIN: CPT

## 2019-02-05 PROCEDURE — 97535 SELF CARE MNGMENT TRAINING: CPT

## 2019-02-05 RX ADMIN — CITALOPRAM HYDROBROMIDE 10 MG: 10 TABLET ORAL at 08:23

## 2019-02-05 RX ADMIN — HYDROMORPHONE HYDROCHLORIDE 2 MG: 2 TABLET ORAL at 22:21

## 2019-02-05 RX ADMIN — AMLODIPINE BESYLATE 2.5 MG: 5 TABLET ORAL at 22:22

## 2019-02-05 RX ADMIN — DEXAMETHASONE SODIUM PHOSPHATE 10 MG: 10 INJECTION INTRAMUSCULAR; INTRAVENOUS at 12:23

## 2019-02-05 RX ADMIN — ACETAMINOPHEN 1000 MG: 500 TABLET, FILM COATED ORAL at 06:16

## 2019-02-05 RX ADMIN — ASPIRIN 81 MG: 81 TABLET, COATED ORAL at 08:22

## 2019-02-05 RX ADMIN — Medication 1 AMPULE: at 08:21

## 2019-02-05 RX ADMIN — ASPIRIN 81 MG: 81 TABLET, COATED ORAL at 22:22

## 2019-02-05 RX ADMIN — ACETAMINOPHEN 1000 MG: 500 TABLET, FILM COATED ORAL at 12:20

## 2019-02-05 RX ADMIN — HYDROMORPHONE HYDROCHLORIDE 2 MG: 2 TABLET ORAL at 17:44

## 2019-02-05 RX ADMIN — HYDROMORPHONE HYDROCHLORIDE 2 MG: 2 TABLET ORAL at 12:23

## 2019-02-05 RX ADMIN — Medication 10 ML: at 17:45

## 2019-02-05 RX ADMIN — LOSARTAN POTASSIUM: 50 TABLET ORAL at 08:22

## 2019-02-05 RX ADMIN — Medication 2 G: at 01:45

## 2019-02-05 RX ADMIN — HYDROMORPHONE HYDROCHLORIDE 2 MG: 2 TABLET ORAL at 08:20

## 2019-02-05 RX ADMIN — ACETAMINOPHEN 1000 MG: 500 TABLET, FILM COATED ORAL at 17:44

## 2019-02-05 RX ADMIN — SENNOSIDES AND DOCUSATE SODIUM 2 TABLET: 8.6; 5 TABLET ORAL at 08:23

## 2019-02-05 RX ADMIN — Medication 5 ML: at 04:55

## 2019-02-05 RX ADMIN — Medication 10 ML: at 22:23

## 2019-02-05 RX ADMIN — Medication 1 AMPULE: at 22:21

## 2019-02-05 RX ADMIN — HYDROCHLOROTHIAZIDE: 25 TABLET ORAL at 09:00

## 2019-02-05 NOTE — PROGRESS NOTES
Problem: Mobility Impaired (Adult and Pediatric)  Goal: *Acute Goals and Plan of Care (Insert Text)  GOALS (1-4 days):  (1.)Ms. Yulia Knight will move from supine to sit and sit to supine  in bed with STAND BY ASSIST.  (2.)Ms. Yulia Knight will transfer from bed to chair and chair to bed with STAND BY ASSIST using the least restrictive device. (3.)Ms. Yulia Knight will ambulate with STAND BY ASSIST for 150 feet with the least restrictive device. (4.)Ms. Yulia Knight will ambulate up/down 2 steps with bilateral  railing with CONTACT GUARD ASSIST with no device. (5.)Ms. Yulia Knight will increase left knee ROM to 5°-80°.  ________________________________________________________________________________________________      PHYSICAL THERAPY Joint camp tKa: Daily Note, PM 2/5/2019  INPATIENT: Hospital Day: 2  Payor: Lucy Schooling / Plan: [address] [place] [address] HMO / Product Type: Managed Care Medicare /      NAME/AGE/GENDER: Ijeoma Christianson is a 82 y.o. female   PRIMARY DIAGNOSIS:  Primary osteoarthritis of left knee [M17.12]   Procedure(s) and Anesthesia Type:     * LEFT KNEE ARTHROPLASTY TOTAL - Spinal (Left)  ICD-10: Treatment Diagnosis:    · Pain in Left Knee (M25.562)  · Stiffness of Left Knee, Not elsewhere classified (M25.662)  · Difficulty in walking, Not elsewhere classified (R26.2)      ASSESSMENT:     Ms. Yulia Knight presents with decreased functional mobility and gait as well as decreased rom and strength of left LE s/p left tka. She plans to go to rehab. She walk to the gym and back, while in the gym she performs exercises without any problems. She will leave tomorrow. This section established at most recent assessment    PROBLEM LIST (Impairments causing functional limitations):  1. Decreased Strength  2. Decreased ADL/Functional Activities  3. Decreased Transfer Abilities  4. Decreased Ambulation Ability/Technique  5. Decreased Balance  6. Increased Pain  7. Decreased Activity Tolerance  8.  Decreased Flexibility/Joint Mobility  9. Decreased [place] with Home Exercise Program   INTERVENTIONS PLANNED: (Benefits and precautions of physical therapy have been discussed with the patient.)  1. Bed Mobility  2. Gait Training  3. Home Exercise Program (HEP)  4. Therapeutic Exercise/Strengthening  5. Transfer Training  6. Range of Motion: active/assisted/passive  7. Therapeutic Activities  8. Group Therapy     TREATMENT PLAN: Frequency/Duration: Follow patient BID for duration of hospital stay to address above goals. Rehabilitation Potential For Stated Goals: Good     RECOMMENDED REHABILITATION/EQUIPMENT: (at time of discharge pending progress): Continue Skilled Therapy and pt. plans for rehab. HISTORY:   History of Present Injury/Illness (Reason for Referral):  S/p left tka  Past Medical History/Comorbidities:   Ms. 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 She also has no past medical history of Adverse effect of anesthesia, Aneurysm ([address] Arrhythmia, Asthma, Autoimmune disease ([address] CAD (coronary artery disease), Cancer ([address] Chronic kidney disease, Chronic obstructive pulmonary disease ([address] Chronic pain, Coagulation disorder ([address] Diabetes ([address] Difficult intubation, Endocarditis, GERD (gastroesophageal reflux disease), Heart failure ([address] Ill-defined condition, Liver disease, Malignant hyperthermia due to anesthesia, Morbid obesity ([address] Nausea & vomiting, Nicotine vapor product user, Non-nicotine vapor product user, Pseudocholinesterase deficiency, PUD (peptic ulcer disease), Rheumatic fever, Seizures ([address] Sleep apnea, Stroke ([address] Thromboembolus ([address] or Thyroid disease.   Ms. Madonna Ruiz  has a past surgical history that includes hx knee arthroscopy (Right); hx colonoscopy; hx knee replacement (Right, 01/24/2018); and hx cataract removal (Bilateral, 2018). Social History/Living Environment:   Home Environment: Private residence  One/Two Story Residence: One story  Living Alone: No  Support Systems: Spouse/Significant Other/Partner  Patient Expects to be Discharged to[de-identified] Rehabilitation facility  Current DME Used/Available at Home: Cane, straight  Prior Level of Function/Work/Activity:  independent   Number of Personal Factors/Comorbidities that affect the Plan of Care: 1-2: MODERATE COMPLEXITY   EXAMINATION:   Most Recent Physical Functioning:                 LLE AROM  L Knee Flexion: 83  L Knee Extension: 5               Transfers  Sit to Stand: Contact guard assistance  Stand to Sit: Contact guard assistance  Bed to Chair: Contact guard assistance    Balance  Sitting: Intact  Standing: Intact; With support(rolling walker)              Weight Bearing Status  Left Side Weight Bearing: As tolerated  Distance (ft): 160 Feet (ft)(x 2)  Ambulation - Level of Assistance: Contact guard assistance  Assistive Device: Walker, rolling  Speed/Joseline: Delayed  Step Length: Left shortened;Right shortened  Stance: Left decreased  Gait Abnormalities: Antalgic;Decreased step clearance  Interventions: Safety awareness training     Braces/Orthotics:     Left Knee Cold  Type: Cryocuff      Body Structures Involved:  1. Bones  2. Joints  3. Muscles  4. Ligaments Body Functions Affected:  1. Movement Related Activities and Participation Affected:  1. Mobility   Number of elements that affect the Plan of Care: 3: MODERATE COMPLEXITY   CLINICAL PRESENTATION:   Presentation: Stable and uncomplicated: LOW COMPLEXITY   CLINICAL DECISION MAKING:   [employer] AM-PAC 6 Clicks   Basic Mobility Inpatient Short Form  How much difficulty does the patient currently have. .. Unable A Lot A Little None   1. Turning over in bed (including adjusting bedclothes, sheets and blankets)? [] 1   [] 2   [x] 3   [] 4   2.   Sitting down on and standing up from a chair with arms ( e.g., wheelchair, bedside commode, etc.)   [] 1   [] 2   [x] 3   [] 4   3. Moving from lying on back to sitting on the side of the bed? [] 1   [] 2   [x] 3   [] 4   How much help from another person does the patient currently need. .. Total A Lot A Little None   4. Moving to and from a bed to a chair (including a wheelchair)? [] 1   [] 2   [x] 3   [] 4   5. Need to walk in hospital room? [] 1   [] 2   [x] 3   [] 4   6. Climbing 3-5 steps with a railing? [] 1   [x] 2   [] 3   [] 4   © 2007, Trustees of [address] [place] [address] [address] [address], under license to [employer]. All rights reserved        Score:  Initial: 17 Most Recent: X (Date: -- )    Interpretation of Tool:  Represents activities that are increasingly more difficult (i.e. Bed mobility, Transfers, Gait). Medical Necessity:     · Patient is expected to demonstrate progress in strength, range of motion and balance to decrease assistance required with theraputic exercises and functional mobility. Reason for Services/Other Comments:  · Patient continues to require present interventions due to patient's inability to perform theraputic exercises and functional mobility independently. Use of outcome tool(s) and clinical judgement create a POC that gives a: Clear prediction of patient's progress: LOW COMPLEXITY            TREATMENT:   (In addition to Assessment/Re-Assessment sessions the following treatments were rendered)     Pre-treatment Symptoms/Complaints:  Doing good   Pain: Initial:   Pain Intensity 1: 0  Post Session:       Therapeutic Exercise: (45 Minutes(group therapy)):  Exercises per grid below to improve mobility and strength. Required minimal visual, verbal and manual cues to promote proper body alignment, promote proper body posture and promote proper body mechanics. Progressed range and repetitions as indicated. Gait Training (15 Minutes):  Gait training to improve and/or restore physical functioning as related to mobility.   Ambulated 160 Feet (ft)(x 2) with Contact guard assistance using a Walker, rolling and minimal Safety awareness training related to their knee position and motion to promote proper body alignment. Date:  2/4 Date:  2/5   Date:     ACTIVITY/EXERCISE AM PM AM PM AM PM   GROUP THERAPY  []  []  []  [x]  []  []   Ankle Pumps  10a 15 15     Quad Sets  10a 15 15     Gluteal Sets  10a 15 15     Hip ABd/ADduction  10a 15 15     Straight Leg Raises   15 15     Knee Slides  10a 15 15     Short Arc Quads   15 15     Long Arc Quads         Chair Slides    15              B = bilateral; AA = active assistive; A = active; P = passive      Treatment/Session Assessment:     Response to Treatment:  Pt tolerated group therapy well    Education:  [x] Home Exercises  [x] Fall Precautions  [] Hip Precautions [] D/C Instruction Review  [x] Knee/Hip Prosthesis Review  [x] Walker Management/Safety [] Adaptive Equipment as Needed       Interdisciplinary Collaboration:   o Occupational Therapist  o Registered Nurse    After treatment position/precautions:   o Up in chair  o Bed/Chair-wheels locked  o Bed in low position  o Caregiver at bedside  o Call light within reach  o Family at bedside    Compliance with Program/Exercises: Will assess as treatment progresses. No questions. Recommendations/Intent for next treatment session:  Treatment next visit will focus on increasing Ms. White's independence with bed mobility, transfers, gait training, strength/ROM exercises, modalities for pain, and patient education.       Total Treatment Duration:  PT Patient Time In/Time Out  Time In: 1300  Time Out: 920 Gillian Miller PTA

## 2019-02-05 NOTE — PROGRESS NOTES
Problem: Mobility Impaired (Adult and Pediatric)  Goal: *Acute Goals and Plan of Care (Insert Text)  GOALS (1-4 days):  (1.)Ms. Nandini Naqvi will move from supine to sit and sit to supine  in bed with STAND BY ASSIST.  (2.)Ms. Nandini Naqvi will transfer from bed to chair and chair to bed with STAND BY ASSIST using the least restrictive device. (3.)Ms. Nandini Naqvi will ambulate with STAND BY ASSIST for 150 feet with the least restrictive device. (4.)Ms. Nandini Naqvi will ambulate up/down 2 steps with bilateral  railing with CONTACT GUARD ASSIST with no device. (5.)Ms. Nandini Naqvi will increase left knee ROM to 5°-80°.  ________________________________________________________________________________________________      PHYSICAL THERAPY Joint camp tKa: Daily Note, AM 2/5/2019  INPATIENT: Hospital Day: 2  Payor: Kobe Ramos / Plan: [address] [place] [address] HMO / Product Type: Managed Care Medicare /      NAME/AGE/GENDER: Rubio Abraham is a 82 y.o. female   PRIMARY DIAGNOSIS:  Primary osteoarthritis of left knee [M17.12]   Procedure(s) and Anesthesia Type:     * LEFT KNEE ARTHROPLASTY TOTAL - Spinal (Left)  ICD-10: Treatment Diagnosis:    · Pain in Left Knee (M25.562)  · Stiffness of Left Knee, Not elsewhere classified (M25.662)  · Difficulty in walking, Not elsewhere classified (R26.2)      ASSESSMENT:     Ms. Nandini Naqvi presents with decreased functional mobility and gait as well as decreased rom and strength of left LE s/p left tka. She plans to go to rehab. She is sitting in the chair upon arrival.  She performs exercises in the chair without any problems. She has a good quad contraction and can do a SLR without help. She increase her distance using RW with CGA and no LOB. She will sit up for awhile and then go to group therapy this afternoon. This section established at most recent assessment   PROBLEM LIST (Impairments causing functional limitations):  1. Decreased Strength  2.  Decreased ADL/Functional Activities  3. Decreased Transfer Abilities  4. Decreased Ambulation Ability/Technique  5. Decreased Balance  6. Increased Pain  7. Decreased Activity Tolerance  8. Decreased Flexibility/Joint Mobility  9. Decreased [place] with Home Exercise Program   INTERVENTIONS PLANNED: (Benefits and precautions of physical therapy have been discussed with the patient.)  1. Bed Mobility  2. Gait Training  3. Home Exercise Program (HEP)  4. Therapeutic Exercise/Strengthening  5. Transfer Training  6. Range of Motion: active/assisted/passive  7. Therapeutic Activities  8. Group Therapy     TREATMENT PLAN: Frequency/Duration: Follow patient BID for duration of hospital stay to address above goals. Rehabilitation Potential For Stated Goals: Good     RECOMMENDED REHABILITATION/EQUIPMENT: (at time of discharge pending progress): Continue Skilled Therapy and pt. plans for rehab. HISTORY:   History of Present Injury/Illness (Reason for Referral):  S/p left tka  Past Medical History/Comorbidities:   Ms. 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  She also has no past medical history of Adverse effect of anesthesia, Aneurysm ([address] Arrhythmia, Asthma, Autoimmune disease ([address] CAD (coronary artery disease), Cancer ([address] Chronic kidney disease, Chronic obstructive pulmonary disease ([address] Chronic pain, Coagulation disorder ([address] Diabetes ([address] Difficult intubation, Endocarditis, GERD (gastroesophageal reflux disease), Heart failure ([address] Ill-defined condition, Liver disease, Malignant hyperthermia due to anesthesia, Morbid obesity ([address] Nausea & vomiting, Nicotine vapor product user, Non-nicotine vapor product user, Pseudocholinesterase deficiency, PUD (peptic ulcer disease), Rheumatic fever, Seizures ([address] Sleep apnea, Stroke ([address] Thromboembolus ([address] or Thyroid disease. Ms. Hua Muro  has a past surgical history that includes hx knee arthroscopy (Right); hx colonoscopy; hx knee replacement (Right, 2018); and hx cataract removal (Bilateral, 2018). Social History/Living Environment:   Home Environment: Private residence  One/Two Story Residence: One story  Living Alone: No  Support Systems: Spouse/Significant Other/Partner  Patient Expects to be Discharged to[de-identified] Rehabilitation facility  Current DME Used/Available at Home: Cane, straight  Prior Level of Function/Work/Activity:  independent   Number of Personal Factors/Comorbidities that affect the Plan of Care: 1-2: MODERATE COMPLEXITY   EXAMINATION:   Most Recent Physical Functioning:                                 Transfers  Sit to Stand: Contact guard assistance  Stand to Sit: Minimum assistance  Bed to Chair: Contact guard assistance                   Weight Bearing Status  Left Side Weight Bearing: As tolerated  Distance (ft): 120 Feet (ft)  Ambulation - Level of Assistance: Contact guard assistance  Assistive Device: Walker, rolling  Speed/Joseline: Delayed  Step Length: Left shortened;Right shortened  Stance: Left decreased  Gait Abnormalities: Antalgic;Decreased step clearance  Interventions: Safety awareness training     Braces/Orthotics:     Left Knee Cold  Type: Cryocuff      Body Structures Involved:  1. Bones  2. Joints  3. Muscles  4. Ligaments Body Functions Affected:  1. Movement Related Activities and Participation Affected:  1. Mobility   Number of elements that affect the Plan of Care: 3: MODERATE COMPLEXITY   CLINICAL PRESENTATION:   Presentation: Stable and uncomplicated: LOW COMPLEXITY   CLINICAL DECISION MAKIN [facility] [address] [id number] AM-PAC 6 Clicks   Basic Mobility Inpatient Short Form  How much difficulty does the patient currently have. .. Unable A Lot A Little None   1. Turning over in bed (including adjusting bedclothes, sheets and blankets)?    [] 1   [] 2 [x] 3   [] 4   2. Sitting down on and standing up from a chair with arms ( e.g., wheelchair, bedside commode, etc.)   [] 1   [] 2   [x] 3   [] 4   3. Moving from lying on back to sitting on the side of the bed? [] 1   [] 2   [x] 3   [] 4   How much help from another person does the patient currently need. .. Total A Lot A Little None   4. Moving to and from a bed to a chair (including a wheelchair)? [] 1   [] 2   [x] 3   [] 4   5. Need to walk in hospital room? [] 1   [] 2   [x] 3   [] 4   6. Climbing 3-5 steps with a railing? [] 1   [x] 2   [] 3   [] 4   © 2007, Trustees of [address] [place] [address], under license to [employer]. All rights reserved        Score:  Initial: 17 Most Recent: X (Date: -- )    Interpretation of Tool:  Represents activities that are increasingly more difficult (i.e. Bed mobility, Transfers, Gait). Medical Necessity:     · Patient is expected to demonstrate progress in strength, range of motion and balance to decrease assistance required with theraputic exercises and functional mobility. Reason for Services/Other Comments:  · Patient continues to require present interventions due to patient's inability to perform theraputic exercises and functional mobility independently. Use of outcome tool(s) and clinical judgement create a POC that gives a: Clear prediction of patient's progress: LOW COMPLEXITY            TREATMENT:   (In addition to Assessment/Re-Assessment sessions the following treatments were rendered)     Pre-treatment Symptoms/Complaints:  Doing good   Pain: Initial:   Pain Intensity 1: 2(about the same after therapy)  Post Session:       Therapeutic Exercise: (15 Minutes):  Exercises per grid below to improve mobility and strength. Required minimal visual, verbal and manual cues to promote proper body alignment, promote proper body posture and promote proper body mechanics. Progressed range and repetitions as indicated.  Gait Training (15 Minutes):  Gait training to improve and/or restore physical functioning as related to mobility. Ambulated 120 Feet (ft) with Contact guard assistance using a Walker, rolling and minimal Safety awareness training related to their knee position and motion to promote proper body alignment. Date:  2/4 Date:  2/5   Date:     ACTIVITY/EXERCISE AM PM AM PM AM PM   GROUP THERAPY  []  []  []  []  []  []   Ankle Pumps  10a 15      Quad Sets  10a 15      Gluteal Sets  10a 15      Hip ABd/ADduction  10a 15      Straight Leg Raises   15      Knee Slides  10a 15      Short Arc Quads   15      Long Arc Quads         Chair Slides                  B = bilateral; AA = active assistive; A = active; P = passive      Treatment/Session Assessment:     Response to Treatment:  Pt.well with gait and TK exercises. Education:  [x] Home Exercises  [x] Fall Precautions  [] Hip Precautions [] D/C Instruction Review  [x] Knee/Hip Prosthesis Review  [x] Walker Management/Safety [] Adaptive Equipment as Needed       Interdisciplinary Collaboration:   o Occupational Therapist  o Registered Nurse    After treatment position/precautions:   o Up in chair  o Bed/Chair-wheels locked  o Bed in low position  o Caregiver at bedside  o Call light within reach  o Family at bedside    Compliance with Program/Exercises: Will assess as treatment progresses. No questions. Recommendations/Intent for next treatment session:  Treatment next visit will focus on increasing Ms. White's independence with bed mobility, transfers, gait training, strength/ROM exercises, modalities for pain, and patient education.       Total Treatment Duration:  PT Patient Time In/Time Out  Time In: 0745  Time Out: 0815    Kathy Miller, PTA

## 2019-02-05 NOTE — PROGRESS NOTES
2019         Post Op day: 1 Day Post-OpProcedure(s) (LRB):  LEFT KNEE ARTHROPLASTY TOTAL (Left)      Admit Date: 2019  Admit Diagnosis: Primary osteoarthritis of left knee [M17.12]    LAB:    Recent Results (from the past 24 hour(s))   TYPE & SCREEN    Collection Time: 19  8:19 AM   Result Value Ref Range    Crossmatch Expiration 2019     ABO/Rh(D) A NEGATIVE     Antibody screen NEG    GLUCOSE, POC    Collection Time: 19  8:32 AM   Result Value Ref Range    Glucose (POC) 90 65 - 100 mg/dL   HEMOGLOBIN    Collection Time: 19  7:44 PM   Result Value Ref Range    HGB 9.7 (L) 11.7 - 15.4 g/dL   HEMOGLOBIN    Collection Time: 19  4:53 AM   Result Value Ref Range    HGB 8.9 (L) 11.7 - 56.7 g/dL   METABOLIC PANEL, BASIC    Collection Time: 19  4:53 AM   Result Value Ref Range    Sodium 138 136 - 145 mmol/L    Potassium 3.9 3.5 - 5.1 mmol/L    Chloride 105 98 - 107 mmol/L    CO2 30 21 - 32 mmol/L    Anion gap 3 mmol/L    Glucose 115 (H) 65 - 100 mg/dL    BUN 19 8 - 23 MG/DL    Creatinine 0.70 0.6 - 1.0 MG/DL    GFR est AA >60 >60 ml/min/1.73m2    GFR est non-AA >60 ml/min/1.73m2    Calcium 8.0 (L) 8.3 - 10.4 MG/DL     Vital Signs:    Patient Vitals for the past 8 hrs:   BP Temp Pulse Resp SpO2   19 0313 124/65 97.7 °F (36.5 °C) 75 -- 97 %   19 2336 116/65 98.5 °F (36.9 °C) 66 16 97 %     Temp (24hrs), Av.9 °F (36.6 °C), Min:97.3 °F (36.3 °C), Max:98.5 °F (36.9 °C)    Body mass index is 22.83 kg/m².   Pain Control:   Pain Assessment  Pain Scale 1: Numeric (0 - 10)  Pain Intensity 1: 4  Pain Onset 1: MONTHS  Pain Location 1: Knee  Pain Orientation 1: Left  Pain Description 1: Constant, Sore  Pain Intervention(s) 1: Medication (see MAR)    Subjective: Doing well, No complaints, No SOB, No Chest Pain, No Nausea or Vomitting     Objective: Vital Signs are Stable, No Acute Distress, Alert and Oriented, Dressing is Dry,  Neurovascular exam is normal.       PT/OT: Assistive Device: Walker (comment)        LLE AROM  L Knee Flexion: 60  L Knee Extension: 10       Weight Bearing Status: WBAT    Meds:  [unfilled]  [unfilled]  [unfilled]    Assessment:   Patient Active Problem List   Diagnosis Code    Malaise and fatigue R53.81, R53.83    Diaphoresis/hyperhidrosis R61    Abnormal EKG R94.31    Bradycardia R00.1    Elevated blood pressure reading R03.0    Preop cardiovascular exam Z01.810    Status post total right knee replacement Z96.651    Osteoarthritis of left knee M17.12    Status post total knee replacement, left L10.530             Plan: Continue Physical Therapy, Monitor  Hbg, She will be going to [facility] after D/C        Signed By: Jana Garcia NP

## 2019-02-05 NOTE — PROGRESS NOTES
Post op interview conducted following LEFT KNEE ARTHROPLASTY TOTAL:  dated 2/4/19, no anesthetic complications noted

## 2019-02-05 NOTE — PROGRESS NOTES
Charisse de initiated along with chart info faxed for their review. Patient aware that reservations and having benefits does not guarantee approval. We should hear from [facility] by tomorrow morning (Wed 2-5)  Three night stay not required.  Laurie Beltran

## 2019-02-05 NOTE — PROGRESS NOTES
Problem: Self Care Deficits Care Plan (Adult)  Goal: *Acute Goals and Plan of Care (Insert Text)  GOALS: GOAL MET 2/5/2019  DISCHARGE GOALS (in preparation for going home/rehab):  3 days  1. Ms. Payal Zarco will perform one lower body dressing activity with minimal assistance required to demonstrate improved functional mobility and safety. 2.  Ms. Payal Zarco will perform one lower body bathing activity with minimal assistance required to demonstrate improved functional mobility and safety. 3.  Ms. Payal Zarco will perform toileting/toilet transfer with contact guard assistance to demonstrate improved functional mobility and safety. 4.  Ms. Payal Zarco will perform shower transfer with contact guard assistance to demonstrate improved functional mobility and safety. JOINT CAMP OCCUPATIONAL THERAPY TKA: Daily Note, Discharge, Treatment Day: 1st and AM 2/5/2019  INPATIENT: Hospital Day: 2  Payor: Alvin Failing / Plan: [address] [place] [address] HMO / Product Type: Managed Care Medicare /      NAME/AGE/GENDER: Erica Vincent is a 82 y.o. female   PRIMARY DIAGNOSIS:  Primary osteoarthritis of left knee [M17.12]   Procedure(s) and Anesthesia Type:     * LEFT KNEE ARTHROPLASTY TOTAL - Spinal (Left)  ICD-10: Treatment Diagnosis:    · Pain in Left Knee (M25.562)  · Stiffness of Left Knee, Not elsewhere classified (X06.106)      ASSESSMENT:    Ms. Payal Zarco is s/p left TKA and presents with decreased weight bearing on left LE and decreased independence with functional mobility and activities of daily living. Patient completed shower and dressing as charter below in ADL grid and is ambulating with rolling walker and supervision assist. Pt moves slow but did very well to include standing at sink to complete full make-up and doing hair. Patient has met 4/4 goals and plans to rehab faciltiy. Pt stated her  is older and she needs to go to rehab.  OT reviewed safety precautions throughout session and therapy schedule for the remainder of today. Patient instructed to call for assistance when needing to get up from recliner and all needs in reach. Patient verbalized understanding of call light. This section established at most recent assessment   PROBLEM LIST (Impairments causing functional limitations):  1. Decreased Strength  2. Decreased ADL/Functional Activities  3. Decreased Transfer Abilities  4. Increased Pain  5. Increased Fatigue  6. Decreased Flexibility/Joint Mobility  7. Decreased Knowledge of Precautions   INTERVENTIONS PLANNED: (Benefits and precautions of occupational therapy have been discussed with the patient.)  1. Activities of daily living training  2. Adaptive equipment training  3. Balance training  4. Clothing management  5. Donning&doffing training  6. Theraputic activity     TREATMENT PLAN: Frequency/Duration: Follow patient 1-2tx to address above goals. Rehabilitation Potential For Stated Goals: Excellent     RECOMMENDED REHABILITATION/EQUIPMENT: (at time of discharge pending progress): Continue Skilled Therapy. OCCUPATIONAL PROFILE AND HISTORY:   History of Present Injury/Illness (Reason for Referral): Pt presents this date s/p (left) TKA. Past Medical History/Comorbidities:   Ms. 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  She also has no past medical history of Adverse effect of anesthesia, Aneurysm ([address] Arrhythmia, Asthma, Autoimmune disease ([address] CAD (coronary artery disease), Cancer ([address] Chronic kidney disease, Chronic obstructive pulmonary disease ([address] Chronic pain, Coagulation disorder ([address] Diabetes ([address] Difficult intubation, Endocarditis, GERD (gastroesophageal reflux disease), Heart failure ([address] Ill-defined condition, Liver disease, Malignant hyperthermia due to anesthesia, Morbid obesity ([address] Nausea & vomiting, Nicotine vapor product user, Non-nicotine vapor product user, Pseudocholinesterase deficiency, PUD (peptic ulcer disease), Rheumatic fever, Seizures ([address] Sleep apnea, Stroke ([facility] Thromboembolus ([facility] or Thyroid disease. Ms. Stephany Rahman  has a past surgical history that includes hx knee arthroscopy (Right); hx colonoscopy; hx knee replacement (Right, 01/24/2018); and hx cataract removal (Bilateral, 2018). Social History/Living Environment:   Home Environment: Private residence  One/Two Story Residence: One story  Living Alone: No  Support Systems: Spouse/Significant Other/Partner  Patient Expects to be Discharged to[de-identified] Rehabilitation facility  Current DME Used/Available at Home: [employer], straight  Prior Level of Function/Work/Activity:  Independent prior. 1/18 had right TKA. Number of Personal Factors/Comorbidities that affect the Plan of Care: Brief history (0):  LOW COMPLEXITY   ASSESSMENT OF OCCUPATIONAL PERFORMANCE[de-identified]   Most Recent Physical Functioning:   Balance  Sitting: Intact  Standing: Intact; With support(rolling walker)                              Mental Status  Neurologic State: Alert  Orientation Level: Oriented X4  Cognition: Appropriate for age attention/concentration                Basic ADLs (From Assessment) Complex ADLs (From Assessment)   Basic ADL  Feeding: Independent  Oral Facial Hygiene/Grooming: Setup  Bathing: Minimum assistance  Type of Bath: Chlorhexidine (CHG), Full, Shower  Upper Body Dressing: Setup  Lower Body Dressing:  Moderate assistance  Toileting: Minimum assistance     Grooming/Bathing/Dressing Activities of Daily Living   Grooming  Grooming Assistance: Supervision/set up(standing at sink)     Upper Body Bathing  Bathing Assistance: Supervision/set-up(seated on shower chair)     Lower Body Bathing  Bathing Assistance: Supervision/set-up(seated on shower chair standing as needed) Toileting  Toileting Assistance: Supervision/set up  Cues: Verbal cues provided  Adaptive Equipment: Grab bars; Lobo Hutch   Upper Body Dressing Assistance  Dressing Assistance: Supervision/set-up Functional Transfers  Bathroom Mobility: Supervision/set up  Toilet Transfer : Supervision  Shower Transfer: Supervision  Cues: Verbal cues provided  Adaptive Equipment: Grab bars; Shower chair with back; Walker (comment)   Lower Body Dressing Assistance  Dressing Assistance: Contact guard assistance(verbal cues) Bed/Mat Mobility  Sit to Stand: Contact guard assistance  Bed to Chair: Contact guard assistance         Physical Skills Involved:  1. Range of Motion  2. Balance  3. Strength  4. Activity Tolerance Cognitive Skills Affected (resulting in the inability to perform in a timely and safe manner): 1. wfl Psychosocial Skills Affected: 1. wfl   Number of elements that affect the Plan of Care: 1-3:  LOW COMPLEXITY   CLINICAL DECISION MAKING:   [employer] AM-PAC 6 Clicks   Daily Activity Inpatient Short Form  How much help from another person does the patient currently need. .. Total A Lot A Little None   1. Putting on and taking off regular lower body clothing? [] 1   [x] 2   [] 3   [] 4   2. Bathing (including washing, rinsing, drying)? [] 1   [x] 2   [] 3   [] 4   3. Toileting, which includes using toilet, bedpan or urinal?   [] 1   [x] 2   [] 3   [] 4   4. Putting on and taking off regular upper body clothing? [] 1   [] 2   [] 3   [x] 4   5. Taking care of personal grooming such as brushing teeth? [] 1   [] 2   [] 3   [x] 4   6. Eating meals? [] 1   [] 2   [] 3   [x] 4   © 2007, Trustees of [facility] [employer], under license to [employer]. All rights reserved     Score:  Initial: 18 Most Recent: X (Date: -- )    Interpretation of Tool:  Represents activities that are increasingly more difficult (i.e. Bed mobility, Transfers, Gait). Medical Necessity:     · Skilled intervention continues to be required due to deficits listed above.   Reason for Services/Other Comments:  · Patient continues to require skilled intervention due to new TKA. Use of outcome tool(s) and clinical judgement create a POC that gives a: MODERATE COMPLEXITY            TREATMENT:   (In addition to Assessment/Re-Assessment sessions the following treatments were rendered)     Pre-treatment Symptoms/Complaints:    Pain: Initial:   Pain Intensity 1: 0  Post Session:  0     Self Care: (60min): Procedure(s) (per grid) utilized to improve and/or restore self-care/home management as related to dressing, bathing, toileting and grooming. Required minimal verbal and   cueing to facilitate activities of daily living skills and compensatory activities. Treatment/Session Assessment:     Response to Treatment:  Good, to shower and standing at sink at length for grooming     Education:  [] Home Exercises  [x] Fall Precautions  [] Hip Precautions [] Going Home Video  [x] Knee/Hip Prosthesis Review  [x] Walker Management/Safety [x] Adaptive Equipment as Needed       Interdisciplinary Collaboration:   o Physical Therapist  o Occupational Therapist  o Registered Nurse    After treatment position/precautions:   o Up in chair  o Bed/Chair-wheels locked  o Call light within reach  o RN notified     Compliance with Program/Exercises: Compliant all of the time. Recommendations/Intent for next treatment session:  Pt progressing with Occupational Therapy and has met 4/4 goals. Patient plans for  continue therapy services at a short term rehabilitation facility. Discharge acute care Occupational Therapy services.       Total Treatment Duration:  OT Patient Time In/Time Out  Time In: 0845  Time Out: [place], OT

## 2019-02-06 VITALS
RESPIRATION RATE: 20 BRPM | DIASTOLIC BLOOD PRESSURE: 59 MMHG | TEMPERATURE: 98 F | BODY MASS INDEX: 22.71 KG/M2 | HEIGHT: 64 IN | HEART RATE: 75 BPM | WEIGHT: 133 LBS | SYSTOLIC BLOOD PRESSURE: 123 MMHG | OXYGEN SATURATION: 98 %

## 2019-02-06 LAB
HGB BLD-MCNC: 8.6 G/DL (ref 11.7–15.4)
MM INDURATION POC: 0 MM (ref 0–5)
PPD POC: NORMAL NEGATIVE

## 2019-02-06 PROCEDURE — 85018 HEMOGLOBIN: CPT

## 2019-02-06 PROCEDURE — 36415 COLL VENOUS BLD VENIPUNCTURE: CPT

## 2019-02-06 PROCEDURE — 74011250637 HC RX REV CODE- 250/637: Performed by: PHYSICIAN ASSISTANT

## 2019-02-06 PROCEDURE — 97150 GROUP THERAPEUTIC PROCEDURES: CPT

## 2019-02-06 PROCEDURE — 77030012935 HC DRSG AQUACEL BMS -B

## 2019-02-06 PROCEDURE — 99239 HOSP IP/OBS DSCHRG MGMT >30: CPT | Performed by: PHYSICAL MEDICINE & REHABILITATION

## 2019-02-06 PROCEDURE — 74011250637 HC RX REV CODE- 250/637: Performed by: ORTHOPAEDIC SURGERY

## 2019-02-06 PROCEDURE — 97116 GAIT TRAINING THERAPY: CPT

## 2019-02-06 RX ADMIN — ACETAMINOPHEN 1000 MG: 500 TABLET, FILM COATED ORAL at 00:50

## 2019-02-06 RX ADMIN — SENNOSIDES AND DOCUSATE SODIUM 2 TABLET: 8.6; 5 TABLET ORAL at 09:08

## 2019-02-06 RX ADMIN — HYDROMORPHONE HYDROCHLORIDE 2 MG: 2 TABLET ORAL at 07:41

## 2019-02-06 RX ADMIN — HYDROMORPHONE HYDROCHLORIDE 2 MG: 2 TABLET ORAL at 13:16

## 2019-02-06 RX ADMIN — CITALOPRAM HYDROBROMIDE 10 MG: 10 TABLET ORAL at 09:08

## 2019-02-06 RX ADMIN — HYDROCHLOROTHIAZIDE: 25 TABLET ORAL at 09:08

## 2019-02-06 RX ADMIN — Medication 1 AMPULE: at 09:08

## 2019-02-06 RX ADMIN — ASPIRIN 81 MG: 81 TABLET, COATED ORAL at 09:08

## 2019-02-06 NOTE — PROGRESS NOTES
I received call from Sami at [facility]). They have approved patient for admission to [facility] today 2-6. Approval # [id number]. Megan at NH has agreed with transfer and given 317 as her room. RN given # to call report. Patient agrees with plans and will have family provide car transport.   Laurie Beltran

## 2019-02-06 NOTE — PROGRESS NOTES
Problem: Falls - Risk of  Goal: *Absence of Falls  Document Fabby Fall Risk and appropriate interventions in the flowsheet.   Outcome: Progressing Towards Goal  Fall Risk Interventions:  Mobility Interventions: Patient to call before getting OOB    Mentation Interventions: Adequate sleep, hydration, pain control    Medication Interventions: Patient to call before getting OOB    Elimination Interventions: Patient to call for help with toileting needs

## 2019-02-06 NOTE — PROGRESS NOTES
02/05/19 2145   Oxygen Therapy   O2 Sat (%) 96 %   Pulse via Oximetry 66 beats per minute   O2 Device Room air   Patient placed on continuous sat monitor #9l. Monitor history deleted prior to placing on patient. Patient on room air. Alarms set per protocol.

## 2019-02-06 NOTE — PROGRESS NOTES
tVery anxious and restless and very talkative. Says she can't sleep with the machine making beeping noises all the time. C/o mild itching but does not want a benadryl . No redness or rash noted. Dressing dry and intact to lt. Knee. Moves very fast to get to the bathroom. Had to slow her down. Rates pain at 6 but only took Tylenol.

## 2019-02-06 NOTE — DISCHARGE SUMMARY
REHABILITATION DISCHARGE SUMMARY     Patient: Hussein Lopez MRN: [id number]  SSN: [id number]    YOB: 1935  Age: 82 y.o. Sex: female      Date: 2/6/2019  Admit Date: 2/4/2019  Discharge Date: 2/6/2019    Admitting Physician: Carolyn Larsen MD   Primary Care Physician: Yobain Palma MD     Admission Condition: good    Chief Complaint : Gait dysfunction secondary to below. Admit Diagnosis: Primary osteoarthritis of left knee [M17.12]  left total knee arthroplasty 2/4/2019  Pain  DVT risk  Post op acute blood loss anemia  Osteoarthritis   Hypertension   R TKA 1/24/2018  Acute Rehab Dx:  Gait impairment  Mobility and ambulation deficits  Self Care/ADL deficits      HPI: Hussein Lopez is a 82 y.o. female patient at [address] [place] [address] who was admitted on 2/4/2019  by Carolyn Larsen MD with below mentioned medical history, is being seen for Physical Medicine and Rehabilitation. Hussein Lopez presented with severe left knee pain due to end stage DJD. Patient underwent a left total knee arthroplasty per Dr. Carolyn Larsen MD on 2/4/2019. Patient is to be WBAT LLE. Patient is starting to stand, taking steps working with acute PT and OT. Patient is making expected gains with ambulation, mobility, and ROM. Patient shows significant functional deficits, gait dysfunciton due to knee pain, decreased ROM and strength. Patient had R TKA in 1/2018 with good result. Patient has been independent with ambulation, prior to admission.       Rehabiitation Course:   Functional  Level On Admission: Walk: Modified Independent  Functional Level At Discharge: Walk: Contact Guard Assist  Home Architecture: Home Situation  Home Environment: Private residence (02/04/19 1530)  One/Two Story Residence: One story (02/04/19 1530)  Living Alone: No (02/04/19 1530)  Support Systems: Spouse/Significant Other/Partner (02/04/19 1530)  Patient Expects to be Discharged to[de-identified] Rehabilitation facility (02/04/19 1537)  Current DME Used/Available at Home: [address] [facility], straight (02/04/19 7520)     Past Medical History:   Diagnosis Date    Arthritis     osteo    Hypertension     on med for control     Insomnia     Murmur     Murmur (Soft VIOLETTA RUSB, Soft TR murmur) heard per cardiology office note dated 05/22/18 (Dr. Angelina Daigle)    Psychiatric disorder     anxiety- citalopram daily    Status post total knee replacement, left 2/4/2019    Status post total right knee replacement 1/24/2018      Past Surgical History:   Procedure Laterality Date    HX CATARACT REMOVAL Bilateral 2018    HX COLONOSCOPY      HX KNEE ARTHROSCOPY Right     HX KNEE REPLACEMENT Right 01/24/2018      Family History   Problem Relation Age of Onset    Cancer Mother     Heart Disease Father       Social History     Tobacco Use    Smoking status: Never Smoker    Smokeless tobacco: Never Used   Substance Use Topics    Alcohol use: Yes     Alcohol/week: 1.2 oz     Types: 2 Glasses of wine per week       Allergies   Allergen Reactions    Hydrocodone Unknown (comments)     \"too strong\"    Penicillins Itching    Sulfa (Sulfonamide Antibiotics) Itching       Prior to Admission medications    Medication Sig Start Date End Date Taking? Authorizing Provider   HYDROmorphone (DILAUDID) 2 mg tablet Take 1 Tab by mouth every four (4) hours as needed for Pain. Max Daily Amount: 12 mg. 2/4/19  Yes JACEK Kennye   aspirin delayed-release 81 mg tablet Take 1 Tab by mouth every twelve (12) hours for 35 days. 2/4/19 3/11/19 Yes JACEK Kenney   vitamin E (AQUA GEMS) 400 unit capsule Take 400 Units by mouth daily. Yes Provider, Historical   omega 3-dha-epa-fish oil (FISH OIL) 100-160-1,000 mg cap Take 1 Cap by mouth Three (3) times a week. Yes Provider, Historical   ZINC ACETATE PO Take 1 Tab by mouth daily. Yes Provider, Historical   aspirin delayed-release 81 mg tablet Take 81 mg by mouth nightly.    Yes Provider, Historical   metoprolol succinate (TOPROL-XL) 25 mg XL tablet Take 1 Tab by mouth nightly. Indications: hypertension 5/22/18  Yes Maximiliano Schroeder MD   amLODIPine (NORVASC) 2.5 mg tablet Take 1 Tab by mouth daily. Patient taking differently: Take 2.5 mg by mouth nightly. 5/22/18  Yes Maximiliano Schroeder MD   losartan-hydroCHLOROthiazide [facility]) 100-25 mg per tablet Take 1 Tab by mouth daily. 5/22/18  Yes Maximiliano Schroeder MD   multivitamin (ONE A DAY) tablet Take 1 Tab by mouth daily. Yes Provider, Historical   calcium-cholecalciferol, d3, (CALCIUM 600 + D) 600-125 mg-unit tab Take 1 Tab by mouth daily. Yes Provider, Historical   temazepam (RESTORIL) 15 mg capsule TAKE ONE CAPSULE BY MOUTH AT BEDTIME,as needed 10/31/17  Yes Provider, Historical   citalopram (CELEXA) 10 mg tablet Take 10 mg by mouth daily. Per anesthesia protocol:instructed to take am of surgery.   Indications: ANXIETY WITH DEPRESSION   Yes Provider, Historical       Current Medications:  Current Facility-Administered Medications   Medication Dose Route Frequency Provider Last Rate Last Dose    amLODIPine (NORVASC) tablet 2.5 mg  2.5 mg Oral QHS JACEK Mcclelland   2.5 mg at 02/05/19 2222    citalopram (CELEXA) tablet 10 mg  10 mg Oral DAILY JACEK Mcclelland   10 mg at 02/06/19 0908    metoprolol succinate (TOPROL-XL) XL tablet 25 mg  25 mg Oral QHS JACEK Mcclelland   Stopped at 02/04/19 2200    temazepam (RESTORIL) capsule 15 mg  15 mg Oral QHS PRN JACEK Mcclelland   15 mg at 02/04/19 2305    alcohol 62% (NOZIN) nasal  1 Ampule  1 Ampule Topical Q12H JACEK Mcclelland   1 Ampule at 02/06/19 0908    sodium chloride (NS) flush 5-40 mL  5-40 mL IntraVENous Q8H JACEK Mcclelland   10 mL at 02/05/19 2223    sodium chloride (NS) flush 5-40 mL  5-40 mL IntraVENous PRN JACEK Mcclelland        acetaminophen (TYLENOL) tablet 1,000 mg  1,000 mg Oral Q6H JACEK Mcclelland   1,000 mg at 02/06/19 0050    HYDROmorphone (DILAUDID) tablet 2 mg  2 mg Oral Q4H PRN JACEK Batres   2 mg at 02/06/19 0741    HYDROmorphone (PF) (DILAUDID) injection 1 mg  1 mg IntraVENous Q3H PRN JACEK Batres        naloxone [facility]) injection 0.2-0.4 mg  0.2-0.4 mg IntraVENous Q10MIN PRN JACEK Batres        ondansetron [facility]) injection 4 mg  4 mg IntraVENous Q4H PRN JACEK Batres        diphenhydrAMINE (BENADRYL) capsule 25 mg  25 mg Oral Q4H PRN JACEK Batres        senna-docusate (PERICOLACE) 8.6-50 mg per tablet 2 Tab  2 Tab Oral DAILY JACEK Batres   2 Tab at 02/06/19 6844    aspirin delayed-release tablet 81 mg  81 mg Oral Q12H JACEK Batres   81 mg at 02/06/19 0908    losartan/hydroCHLOROthiazide (HYZAAR) 100/25 mg   Oral DAILY Dali Nuñez MD            Review of Systems: Denies chest pain, shortness of breath, cough, headache, visual problems, abdominal pain, dysurea, calf pain. Pertinent positives are as noted in the medical records and unremarkable otherwise. Vital Signs:   Patient Vitals for the past 8 hrs:   BP Temp Pulse Resp SpO2   02/06/19 1130 123/59 98 °F (36.7 °C) 75 20 98 %   02/06/19 0841 144/72 98 °F (36.7 °C) 69 20 99 %        Physical Exam:   General: Alert and age appropriately oriented. No acute cardio respiratory distress. HEENT: Normocephalic. Oral mucosa moist without cyanosis. Lungs: Clear to auscultation  bilaterally. Respiration even and unlabored   Heart: Regular rate and rhythm, S1, S2   No  murmurs, clicks, rub or gallops   Abdomen: Soft, non-tender, nondistended. Bowel sounds present   Genitourinary: defered   Neuromuscular:      Grossly no focal neurological deficits noted. L Ankle dorsiflexion 5/5   L Ankle plantarflexion 5/5  R Ankle dorsiflexion 5/5   R Ankle plantarflexion 5/5  No sensory deficits. Skin/extremity: No rashes, no erythema. Calves soft. Wound covered.      Skin Incision(s)/Wound(s):        Lab Review:   Recent Results (from the past 72 hour(s))   TYPE & SCREEN    Collection Time: 02/04/19  8:19 AM   Result Value Ref Range    Crossmatch Expiration 02/07/2019     ABO/Rh(D) A NEGATIVE     Antibody screen NEG    GLUCOSE, POC    Collection Time: 02/04/19  8:32 AM   Result Value Ref Range    Glucose (POC) 90 65 - 100 mg/dL   HEMOGLOBIN    Collection Time: 02/04/19  7:44 PM   Result Value Ref Range    HGB 9.7 (L) 11.7 - 15.4 g/dL   HEMOGLOBIN    Collection Time: 02/05/19  4:53 AM   Result Value Ref Range    HGB 8.9 (L) 11.7 - 69.4 g/dL   METABOLIC PANEL, BASIC    Collection Time: 02/05/19  4:53 AM   Result Value Ref Range    Sodium 138 136 - 145 mmol/L    Potassium 3.9 3.5 - 5.1 mmol/L    Chloride 105 98 - 107 mmol/L    CO2 30 21 - 32 mmol/L    Anion gap 3 mmol/L    Glucose 115 (H) 65 - 100 mg/dL    BUN 19 8 - 23 MG/DL    Creatinine 0.70 0.6 - 1.0 MG/DL    GFR est AA >60 >60 ml/min/1.73m2    GFR est non-AA >60 ml/min/1.73m2    Calcium 8.0 (L) 8.3 - 10.4 MG/DL   HEMOGLOBIN    Collection Time: 02/06/19  4:16 AM   Result Value Ref Range    HGB 8.6 (L) 11.7 - 15.4 g/dL       PT Initial  PT Most Recent   AROM: Within functional limits(right LE) (02/04/19 1500) Within functional limits(right LE) (02/04/19 1500)         Strength: Generally decreased, functional(right LE) (02/04/19 1500) Generally decreased, functional(right LE) (02/04/19 1500)                           Distance (ft): 15 Feet (ft) (02/04/19 1500) 160 Feet (ft)(x 2) (02/06/19 1000)   Assistive Device: Walker, rolling (02/04/19 1500) Walker, rolling (02/06/19 1000)       OT Initial OT Most Recent                                               ST Initial ST Most Recent                        Principal Problem:    Status post total knee replacement, 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 Devices:Walkers, Type: Rolling Walker  2. Consult:Rehab team including PT, OT,  and . 3. Disposition Rehab-discussed with patient. 4. Thigh-high or knee-high ORLY's when out of bed. 5. DVT Prophylaxis - aspirin 81mg bid x 30days. Please notify surgeon at [address] if DVT diagnosed. 6. Incentive spirometer Q1H while awake  7. Post op hemorrhagic anemia- monitor. 8. Activity: WBAT LLE  9. Planned Labs: CBC,BMP  10. Pain Control:  Continue scheduled tylenol, celebrex and  PRN meds. 11. Wound Care: May remove Aquacel 1 week post op and replace with Tegaderm and sterile gauze dressing. Keep wound clean and dry and reinforce dressing PRN. Remove staples 12-14 post surgery, when incision appears appropriately closed and apply benzoin and 1/2\" steristrips. 12. In case of complications: Please notify surgeon at [address] if suspect infection, cellulitis, wound dehiscence or instrument failure, and if considering starting antibiotic. Follow up with ORTHO per instructions. [address]        Discharge Medications:          acetaminophen (TYLENOL) tablet 1,000 mg Ordered Dose: 1,000 mg Route: Oral Frequency: EVERY 8 HOURS x 1 week then change to prn.      senna-docusate (PERICOLACE) 8.6-50 mg per tablet 2 Tab Ordered Dose: 2 Tab Route: Oral Frequency: DAILY       Current Discharge Medication List      START taking these medications    Details   HYDROmorphone (DILAUDID) 2 mg tablet Take 1 Tab by mouth every four (4) hours as needed for Pain. Max Daily Amount: 12 mg.        Associated Diagnoses: Status post total knee replacement, left         CONTINUE these medications which have CHANGED    Details   aspirin delayed-release 81 mg tablet Take 1 Tab by mouth every twelve (12) hours for 35 days. Associated Diagnoses: Status post total knee replacement, left         CONTINUE these medications which have NOT CHANGED    Details   ZINC ACETATE PO Take 1 Tab by mouth daily. metoprolol succinate (TOPROL-XL) 25 mg XL tablet Take 1 Tab by mouth nightly. Indications: hypertension         amLODIPine (NORVASC) 2.5 mg tablet Take 1 Tab by mouth daily. losartan-hydroCHLOROthiazide (HYZAAR) 100-25 mg per tablet Take 1 Tab by mouth daily. multivitamin (ONE A DAY) tablet Take 1 Tab by mouth daily. calcium-cholecalciferol, d3, (CALCIUM 600 + D) 600-125 mg-unit tab Take 1 Tab by mouth daily. temazepam (RESTORIL) 15 mg capsule TAKE ONE CAPSULE BY MOUTH AT BEDTIME,as needed         citalopram (CELEXA) 10 mg tablet Take 10 mg by mouth daily. Indications: ANXIETY WITH DEPRESSION         STOP taking these medications       vitamin E (AQUA GEMS) 400 unit capsule Comments:   Reason for Stopping:         omega 3-dha-epa-fish oil (FISH OIL) 100-160-1,000 mg cap Comments:   Reason for Stopping:               Discharge time: 35 minutes.     Signed By: Tyron Tracy MD     February 6, 2019

## 2019-02-06 NOTE — PROGRESS NOTES
TRANSFER - OUT REPORT:    Verbal report given to Candelaria(name) on Sadaf Goode  being transferred to [facility](unit) for routine progression of care       Report consisted of patients Situation, Background, Assessment and   Recommendations(SBAR). Information from the following report(s) SBAR, Kardex, Intake/Output and MAR was reviewed with the receiving nurse. Lines:       Opportunity for questions and clarification was provided.

## 2019-02-06 NOTE — PROGRESS NOTES
I rec'd call from [facility]) reporting that they have determined that patient's case will require a Peer to Peer review. I made them aware that today is Dr. Marianne Rich surgery day. Charge nurse will get message to Dr. Mac Ramos. [employer] must here from our physician by noon tomorrow. Patient can transfer today if approval obtained.    Nola Mann

## 2019-02-06 NOTE — PROGRESS NOTES
Problem: Mobility Impaired (Adult and Pediatric)  Goal: *Acute Goals and Plan of Care (Insert Text)  GOALS (1-4 days):  (1.)Ms. Yulia Knight will move from supine to sit and sit to supine  in bed with STAND BY ASSIST. 2/6  (2.)Ms. Yulia Knight will transfer from bed to chair and chair to bed with STAND BY ASSIST using the least restrictive device. 2/6  (3.)Ms. Yulia Knight will ambulate with STAND BY ASSIST for 150 feet with the least restrictive device. 2/6  (4.)Ms. Yulia Knight will ambulate up/down 2 steps with bilateral  railing with CONTACT GUARD ASSIST with no device. 2/6  (5.)Ms. Yulia Knight will increase left knee ROM to 5°-80°. 2/6  ________________________________________________________________________________________________      PHYSICAL THERAPY [facility] tKa: Daily Note, AM 2/6/2019  INPATIENT: Hospital Day: 3  Payor: Lucy Schooling / Plan: [address] [place] [address] HMO / Product Type: Managed Care Medicare /      NAME/AGE/GENDER: Ijeoma Christianson is a 82 y.o. female   PRIMARY DIAGNOSIS:  Primary osteoarthritis of left knee [M17.12]   Procedure(s) and Anesthesia Type:     * LEFT KNEE ARTHROPLASTY TOTAL - Spinal (Left)  ICD-10: Treatment Diagnosis:    · Pain in Left Knee (M25.562)  · Stiffness of Left Knee, Not elsewhere classified (M25.662)  · Difficulty in walking, Not elsewhere classified (R26.2)      ASSESSMENT:     Ms. Yulia Knight presents with decreased functional mobility and gait as well as decreased rom and strength of left LE s/p left tka. She plans to go to rehab. She continue to make improvement with gait and exercises. This section established at most recent assessment    PROBLEM LIST (Impairments causing functional limitations):  1. Decreased Strength  2. Decreased ADL/Functional Activities  3. Decreased Transfer Abilities  4. Decreased Ambulation Ability/Technique  5. Decreased Balance  6. Increased Pain  7. Decreased Activity Tolerance  8. Decreased Flexibility/Joint Mobility  9.  Decreased [place] with Home Exercise Program   INTERVENTIONS PLANNED: (Benefits and precautions of physical therapy have been discussed with the patient.)  1. Bed Mobility  2. Gait Training  3. Home Exercise Program (HEP)  4. Therapeutic Exercise/Strengthening  5. Transfer Training  6. Range of Motion: active/assisted/passive  7. Therapeutic Activities  8. Group Therapy     TREATMENT PLAN: Frequency/Duration: Follow patient BID for duration of hospital stay to address above goals. Rehabilitation Potential For Stated Goals: Good     RECOMMENDED REHABILITATION/EQUIPMENT: (at time of discharge pending progress): Continue Skilled Therapy and pt. plans for rehab. HISTORY:   History of Present Injury/Illness (Reason for Referral):  S/p left tka  Past Medical History/Comorbidities:   Ms. 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 She also has no past medical history of Adverse effect of anesthesia, Aneurysm ([address] Arrhythmia, Asthma, Autoimmune disease ([address] CAD (coronary artery disease), Cancer ([address] Chronic kidney disease, Chronic obstructive pulmonary disease ([address] Chronic pain, Coagulation disorder ([address] Diabetes ([address] Difficult intubation, Endocarditis, GERD (gastroesophageal reflux disease), Heart failure ([address] Ill-defined condition, Liver disease, Malignant hyperthermia due to anesthesia, Morbid obesity ([address] Nausea & vomiting, Nicotine vapor product user, Non-nicotine vapor product user, Pseudocholinesterase deficiency, PUD (peptic ulcer disease), Rheumatic fever, Seizures ([address] Sleep apnea, Stroke ([address] Thromboembolus ([address] or Thyroid disease. Ms. Jerardo Gasca  has a past surgical history that includes hx knee arthroscopy (Right); hx colonoscopy; hx knee replacement (Right, 01/24/2018); and hx cataract removal (Bilateral, 2018).   Social History/Living Environment:   Home Environment: Private residence  One/Two Story Residence: One story  Living Alone: No  Support Systems: Spouse/Significant Other/Partner  Patient Expects to be Discharged to[de-identified] Rehabilitation facility  Current DME Used/Available at Home: Cane, straight  Prior Level of Function/Work/Activity:  independent   Number of Personal Factors/Comorbidities that affect the Plan of Care: 1-2: MODERATE COMPLEXITY   EXAMINATION:   Most Recent Physical Functioning:                 LLE AROM  L Knee Flexion: 92  L Knee Extension: 5               Transfers  Sit to Stand: Stand-by assistance  Stand to Sit: Stand-by assistance  Bed to Chair: Stand-by assistance                   Weight Bearing Status  Left Side Weight Bearing: As tolerated  Distance (ft): 160 Feet (ft)(x 2)  Ambulation - Level of Assistance: Stand-by assistance  Assistive Device: Walker, rolling  Speed/Joseline: Delayed  Step Length: Left shortened;Right shortened  Stance: Left decreased  Gait Abnormalities: Antalgic;Decreased step clearance  Number of Stairs Trained: 6  Stairs - Level of Assistance: Stand-by assistance  Rail Use: Both  Interventions: Safety awareness training     Braces/Orthotics:     Left Knee Cold  Type: Cryocuff      Body Structures Involved:  1. Bones  2. Joints  3. Muscles  4. Ligaments Body Functions Affected:  1. Movement Related Activities and Participation Affected:  1. Mobility   Number of elements that affect the Plan of Care: 3: MODERATE COMPLEXITY   CLINICAL PRESENTATION:   Presentation: Stable and uncomplicated: LOW COMPLEXITY   CLINICAL DECISION MAKING:   [employer] AM-PAC 6 Clicks   Basic Mobility Inpatient Short Form  How much difficulty does the patient currently have. .. Unable A Lot A Little None   1. Turning over in bed (including adjusting bedclothes, sheets and blankets)? [] 1   [] 2   [x] 3   [] 4   2.   Sitting down on and standing up from a chair with arms ( e.g., wheelchair, bedside commode, etc.) [] 1   [] 2   [x] 3   [] 4   3. Moving from lying on back to sitting on the side of the bed? [] 1   [] 2   [x] 3   [] 4   How much help from another person does the patient currently need. .. Total A Lot A Little None   4. Moving to and from a bed to a chair (including a wheelchair)? [] 1   [] 2   [x] 3   [] 4   5. Need to walk in hospital room? [] 1   [] 2   [x] 3   [] 4   6. Climbing 3-5 steps with a railing? [] 1   [x] 2   [] 3   [] 4   © 2007, Trustees of [address] [place] [address] [facility], under license to [employer]. All rights reserved        Score:  Initial: 17 Most Recent: X (Date: -- )    Interpretation of Tool:  Represents activities that are increasingly more difficult (i.e. Bed mobility, Transfers, Gait). Medical Necessity:     · Patient is expected to demonstrate progress in strength, range of motion and balance to decrease assistance required with theraputic exercises and functional mobility. Reason for Services/Other Comments:  · Patient continues to require present interventions due to patient's inability to perform theraputic exercises and functional mobility independently. Use of outcome tool(s) and clinical judgement create a POC that gives a: Clear prediction of patient's progress: LOW COMPLEXITY            TREATMENT:   (In addition to Assessment/Re-Assessment sessions the following treatments were rendered)     Pre-treatment Symptoms/Complaints:  Doing good   Pain: Initial:   Pain Intensity 1: 0  Post Session:       Therapeutic Exercise: (30 Minutes(group therapy)):  Exercises per grid below to improve mobility and strength. Required minimal visual, verbal and manual cues to promote proper body alignment, promote proper body posture and promote proper body mechanics. Progressed range and repetitions as indicated. Gait Training (15 Minutes):  Gait training to improve and/or restore physical functioning as related to mobility.   Ambulated 160 Feet (ft)(x 2) with Stand-by assistance using a Walker, rolling and minimal Safety awareness training related to their knee position and motion to promote proper body alignment. Date:  2/4 Date:  2/5   Date:  2/6     ACTIVITY/EXERCISE AM PM AM PM AM PM   GROUP THERAPY  []  []  []  [x]  [x]  []   Ankle Pumps  10a 15 15 20    Quad Sets  10a 15 15 20    Gluteal Sets  10a 15 15 20    Hip ABd/ADduction  10a 15 15 20    Straight Leg Raises   15 15 20    Knee Slides  10a 15 15 20    Short Arc Quads   15 15 20    Long Arc Quads         Chair Slides    15 20             B = bilateral; AA = active assistive; A = active; P = passive      Treatment/Session Assessment:     Response to Treatment:  Pt tolerated group therapy well    Education:  [x] Home Exercises  [x] Fall Precautions  [] Hip Precautions [] D/C Instruction Review  [x] Knee/Hip Prosthesis Review  [x] Walker Management/Safety [] Adaptive Equipment as Needed       Interdisciplinary Collaboration:   o Registered Nurse    After treatment position/precautions:   o Up in chair  o Bed/Chair-wheels locked  o Bed in low position  o Caregiver at bedside  o Call light within reach  o Family at bedside    Compliance with Program/Exercises: Will assess as treatment progresses. No questions. Recommendations/Intent for next treatment session:  Treatment next visit will focus on increasing Ms. White's independence with bed mobility, transfers, gait training, strength/ROM exercises, modalities for pain, and patient education.       Total Treatment Duration:  PT Patient Time In/Time Out  Time In: 0930  [facility] Al[facility] PTA

## 2019-02-06 NOTE — PROGRESS NOTES
2019         Post Op day: 2 Days Post-OpProcedure(s) (LRB):  LEFT KNEE ARTHROPLASTY TOTAL (Left)      Admit Date: 2019  Admit Diagnosis: Primary osteoarthritis of left knee [M17.12]    LAB:    Recent Results (from the past 24 hour(s))   HEMOGLOBIN    Collection Time: 19  4:16 AM   Result Value Ref Range    HGB 8.6 (L) 11.7 - 15.4 g/dL     Vital Signs:    Patient Vitals for the past 8 hrs:   BP Temp Pulse Resp SpO2   19 0004 156/74 98.2 °F (36.8 °C) 61 18 96 %     Temp (24hrs), Av.1 °F (36.7 °C), Min:97.9 °F (36.6 °C), Max:98.2 °F (36.8 °C)    Body mass index is 22.83 kg/m².   Pain Control:   Pain Assessment  Pain Scale 1: Numeric (0 - 10)  Pain Intensity 1: 6  Pain Onset 1: MONTHS  Pain Location 1: Knee  Pain Orientation 1: Left  Pain Description 1: Constant, Sore  Pain Intervention(s) 1: Medication (see MAR)    Subjective: Doing well, A little more discomfort today, No SOB, No Chest Pain, No Nausea or Vomitting     Objective: Vital Signs are Stable, No Acute Distress, Alert and Oriented, Dressing is Dry,  Neurovascular exam is normal.       PT/OT:            Assistive Device: Walker (comment)        LLE AROM  L Knee Flexion: 83  L Knee Extension: 5       Weight Bearing Status: WBAT    Meds:  [unfilled]  [unfilled]  [unfilled]    Assessment:   Patient Active Problem List   Diagnosis Code    Malaise and fatigue R53.81, R53.83    Diaphoresis/hyperhidrosis R61    Abnormal EKG R94.31    Bradycardia R00.1    Elevated blood pressure reading R03.0    Preop cardiovascular exam Z01.810    Status post total right knee replacement Z96.651    Osteoarthritis of left knee M17.12    Status post total knee replacement, left Z87.649             Plan: Continue Physical Therapy, Monitor  Labs, She may go to [address] [place] [address] today pending approval.        Signed By: Leonidas Bronson NP

## 2019-03-11 ENCOUNTER — HOSPITAL ENCOUNTER (OUTPATIENT)
Dept: PHYSICAL THERAPY | Age: 84
Discharge: HOME | End: 2019-03-11
Payer: MEDICARE

## 2019-03-11 PROCEDURE — 97162 PT EVAL MOD COMPLEX 30 MIN: CPT

## 2019-03-11 PROCEDURE — 97110 THERAPEUTIC EXERCISES: CPT

## 2019-03-11 NOTE — THERAPY EVALUATION
*  [facility]  : 1935  Primary: Genette Points [employer] Hmo  Secondary:  [address]  at [address]  [address], [facility] [address]  [phone]   [phone]       OUTPATIENT PHYSICAL THERAPY:Initial Assessment 3/11/2019   ICD-10: Treatment Diagnosis: Difficulty in walking, not elsewhere classified (R26.2)  Pain in left knee (M25.562)  Presence of left artificial knee joint (T94.159)  Precautions/Allergies:   Hydrocodone; Penicillins; and Sulfa (sulfonamide antibiotics)   MD Orders: Evaluate and treat MEDICAL/REFERRING DIAGNOSIS:  Presence of left artificial knee joint [Z96.652]   DATE OF ONSET: 2019  REFERRING PHYSICIAN: Sherie Elena MD  RETURN PHYSICIAN APPOINTMENT: unknown     INITIAL ASSESSMENT:  Ms. Sebastian Camejo presents with mildly limited ROM, decreased strength, decreased endurance, impaired balance and gait pattern following L TKA ~ 5 weeks ago. She reports only moderate pain/tightness and feels she is generally doing well and is anxious to be able to return to exercise program at [facility] including water aerobics and perhaps line dancing. She can benefit from skilled therapy to address above deficits to accomplish goals. She is a pleasant lady and is likely to progress well with rehabilitation program.   PROBLEM LIST (Impacting functional limitations):  1. Decreased Strength  2. Decreased ADL/Functional Activities  3. Decreased Ambulation Ability/Technique  4. Decreased Balance  5. Increased Pain  6. Decreased Activity Tolerance  7. Decreased Flexibility/Joint Mobility  8. Decreased [place] with Home Exercise Program INTERVENTIONS PLANNED: (Treatment may consist of any combination of the following)  1. Balance/Proprioceptive Exercise  2. Home Exercise Program (HEP)  3. Manual Therapy  4. Range of Motion (ROM)  5. Therapeutic Exercise/Strengthening  6. Gait Training  7.  Modalities/taping as indicated   TREATMENT PLAN:  Effective Dates: 3/11/2019 TO 6/9/2019 (90 days). Frequency/Duration: 2 times a week for 90 Day(s)  Frequency/duration may be adjusted upon ongoing reassessment  GOALS: (Goals have been discussed and agreed upon with patient.)  Short-Term Functional Goals: Time Frame: 2 - 4 weeks  1. Decrease pain by 2/10 for improved sleep and increased WB tolerance  2. Increase knee AROM to 0-125° for improved gait pattern and ease with sitting  3. Patient to be able to perform 3 sets/10 step-ups on 8\" step for greater ease with stairs and functional strength. 4. Establish independent HEP with minimal cueing to increase knee ROM and strength  Discharge Goals: Time Frame: 12 weeks  1. Increase knee AROM to 0-130 ° to improve gait, sitting and getting in/out of car  2. Increase strength musculature to at least 4/5 for reciprocal stair climbing with rail, and ambulation > 20 minutes  3. Improve score on LEFS by >9 points to allow return to advanced ADL's  4. [place] in advanced HEP with no cueing to maintain/improve gains made in therapy - patient to be able to return to water aerobics and regular gym program.   CLINICAL DECISION MAKING:   OUTCOME MEASURE:   Tool Used: Lower Extremity Functional Scale (LEFS)  Score:  Initial: 69/80 Most Recent: X/80 (Date: -- )   Interpretation of Score: 20 questions each scored on a 5 point scale with 0 representing \"extreme difficulty or unable to perform\" and 4 representing \"no difficulty\". The lower the score, the greater the functional disability. 80/80 represents no disability. Minimal detectable change is 9 points. MEDICAL NECESSITY:   · Patient demonstrates excellent rehab potential due to higher previous functional level.   · Patient is expected to demonstrate progress in strength, range of motion, balance and functional technique to increase independence with all LE ADL's.  REASON FOR SERVICES/OTHER COMMENTS:  · Patient continues to require modification of therapeutic interventions to increase complexity of exercises. · Patient continues to require skilled intervention due to pain, decreased ROM & strength, limiting LE ADL's. Use of outcome tool(s) and clinical judgement create a POC that gives a: Questionable prediction of patient's progress: MODERATE COMPLEXITY      Evaluation: (X)  PT Patient Time In/Time Out  Time In: 1100  Time Out: 1200    Rehabilitation Potential For Stated Goals: Excellent  Regarding Jennifer White's therapy, I certify that the treatment plan above will be carried out by a therapist or under their direction. Thank you for this referral,  Varghese Palmer, PT     Referring Physician Signature: Danny Aquino MD _______________________________ Date _____________     HISTORY:   History of Injury/Illness (Reason for Referral):  Patient had L TKA on 2/4/19 - hospital 2 days followed by ~ 10 days at [facility] and further couple of weeks of home health therapy. States she had moderate pain - has not taken pain med since release from [facility]. Has started using stationary bike at [facility] a couple of days/week nut anxious to get back to regular exercise program including water aerobics and hopefully line dancing. Knee feels tight and has some difficulty sleeping. No stairs in house except from garage. Past Medical History/Comorbidities:   Ms. 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  She also has no past medical history of Adverse effect of anesthesia, Aneurysm ([address] Arrhythmia, Asthma, Autoimmune disease ([address] CAD (coronary artery disease), Cancer ([address] Chronic kidney disease, Chronic obstructive pulmonary disease ([address] Chronic pain, Coagulation disorder ([address] Diabetes ([address] Difficult intubation, Endocarditis, GERD (gastroesophageal reflux disease), Heart failure ([address] Ill-defined condition, Liver disease, Malignant hyperthermia due to anesthesia, Morbid obesity ([facility] Nausea & vomiting, Nicotine vapor product user, Non-nicotine vapor product user, Pseudocholinesterase deficiency, PUD (peptic ulcer disease), Rheumatic fever, Seizures ([facility] [address] Sleep apnea, Stroke ([facility] [address] Thromboembolus ([facility] [address] [facility] or Thyroid disease. Ms. Yulia Knight  has a past surgical history that includes hx knee arthroscopy (Right); hx colonoscopy; hx knee replacement (Right, 01/24/2018); and hx cataract removal (Bilateral, 2018). tylenol for pain prn  Social History/Living Environment:    lives with  who is older than her but in relatively good shape  Prior Level of Function/Work/Activity:  Moderately active, exercises 3 x/week  Personal Factors:          Sex:  female        Age:  82 y.o. Past/Current Experience:  Successful with PT after R TKA 1 year ago   Ambulatory/Rehab Services H2 Model Falls Risk Assessment   Risk Factors:       No Risk Factors Identified  Click here to CLEAR selections Ability to Rise from Chair:       (1)  Pushes up, successful in one attempt   Falls Prevention Plan:       No modifications necessary   Total: (5 or greater = High Risk): 1   ©2010 [facility] of [employer]. All Rights Reserved. [facility] Patent #[id number]. Federal Law prohibits the replication, distribution or use without written permission from [facility] [employer]   Current Medications:       Current Outpatient Medications:     HYDROmorphone (DILAUDID) 2 mg tablet, Take 1 Tab by mouth every four (4) hours as needed for Pain. Max Daily Amount: 12 mg., Disp: 40 Tab, Rfl: 0    aspirin delayed-release 81 mg tablet, Take 1 Tab by mouth every twelve (12) hours for 35 days. , Disp: 60 Tab, Rfl: 1    ZINC ACETATE PO, Take 1 Tab by mouth daily. , Disp: , Rfl:     metoprolol succinate (TOPROL-XL) 25 mg XL tablet, Take 1 Tab by mouth nightly. Indications: hypertension, Disp: 90 Tab, Rfl: 3    amLODIPine (NORVASC) 2.5 mg tablet, Take 1 Tab by mouth daily.  (Patient taking differently: Take 2.5 mg by mouth nightly.), Disp: 90 Tab, Rfl: 3    losartan-hydroCHLOROthiazide (HYZAAR) 100-25 mg per tablet, Take 1 Tab by mouth daily. , Disp: 90 Tab, Rfl: 3    multivitamin (ONE A DAY) tablet, Take 1 Tab by mouth daily. , Disp: , Rfl:     calcium-cholecalciferol, d3, (CALCIUM 600 + D) 600-125 mg-unit tab, Take 1 Tab by mouth daily. , Disp: , Rfl:     temazepam (RESTORIL) 15 mg capsule, TAKE ONE CAPSULE BY MOUTH AT BEDTIME,as needed, Disp: , Rfl: 0    citalopram (CELEXA) 10 mg tablet, Take 10 mg by mouth daily. Per anesthesia protocol:instructed to take am of surgery. Indications: ANXIETY WITH DEPRESSION, Disp: , Rfl:    Date Last Reviewed:  3/11/2019   Number of Personal Factors/Comorbidities that affect the Plan of Care: 1-2: MODERATE COMPLEXITY   EXAMINATION:   Observation/Orthostatic Postural Assessment:         Patient ambulating to PT with slight antalgic gait pattern, narrow base of support. Mild swelling, well-healed midline surgical incision. Palpation:          Very tender over lateral aspect knee, ITB area. Decreased pat mobility - mild  ROM:                LLE Assessment  LLE Assessment (WDL): Exception to WDL  LLE AROM  L Knee Flexion: 120  L Knee Extension: 0            RLE Assessment  RLE Assessment (WDL): Within defined limits      Strength:       LLE Strength  L Hip Flexion: 3+  L Hip Extension: 3+  L Hip ABduction: 3  L Knee Flexion: 3+  L Knee Extension: 3  L Ankle Plantar Flexion: 3+                  Special Tests:        NA  Neurological Screen:       Sensation: WDL  Functional Mobility:       Gait/Ambulation:  Mild L hip drop during stance phase, decreased stride length/speed, decreased push-off        Transfers: sit to stand from plinth with mild difficulty,         Stairs: mod difficulty > 6\" step  Balance:        Poor on L, fair to good on R   Body Structures Involved:  1. Bones  2. Joints  3. Muscles  4.  Ligaments Body Functions Affected:  1. Sensory/Pain  2. Neuromusculoskeletal  3. Movement Related Activities and Participation Affected:  1. General Tasks and Demands  2. Mobility  3. Self Care  4. Domestic Life  5. Community, Social and [place]   Number of elements (examined above) that affect the Plan of Care: 3: MODERATE COMPLEXITY   CLINICAL PRESENTATION:   Presentation: Evolving clinical presentation with changing clinical characteristics: MODERATE COMPLEXITY   Assessment Complexity Level Charged:  Moderate Complexity [62239]

## 2019-03-11 NOTE — PROGRESS NOTES
Jerel Gracia  : 1935  Primary: Laney Mcqueen Medicare Hmo  Secondary:  Therapy Center at [address], [address]  [phone]   [phone]        OUTPATIENT PHYSICAL THERAPY: Daily Treatment Note 3/11/2019  Pre-treatment Symptoms/Complaints:  Moderate pain and tightness L knee  Pain: Initial: Pain Intensity 1: 4  Post Session:  3/10   Medications Last Reviewed:  3/11/2019    Updated Objective Findings:  See evaluation note from today   TREATMENT:   THERAPEUTIC EXERCISE: (15 minutes):  Exercises per grid below to improve mobility, strength and balance. Required moderate verbal and tactile cues to promote proper body alignment and promote proper body mechanics. Progressed resistance, range and repetitions as indicated. MANUAL THERAPY: (5 minutes): Joint mobilization and Soft tissue mobilization was utilized and necessary because of the patient's restricted joint motion, loss of articular motion and restricted motion of soft tissue. Pat mob, PROM/stretching as tolerated.    Date:  3/11/19 Date:   Date:     Activity/Exercise Parameters Parameters Parameters   QS 1 x 10     SLR's 2 x 10     SAQ's 2 x 10, 3#     LAQ's` 2 x 10, 3#     Sit to stand 1 x 10     Partial squats 1 x 10     Step-ups 2 x 10, 6\"     Hamstring curl machine 2 x 10, 20#     3 way hip machine - hip flex 1 x 10, 17#                                    - hip abd 1 x 10, 17#     HC stretches slant board 2 x 30 sec     SLS - balance 3 reps     Nu-step 6 mins, level 4     Gait training to improve heel-toe gait pattern    [employer] Portal     Access Code: [email or web address]  Exercises      Long Sitting Quad Set - 10 reps - 2 sets - 6 hold - 2x daily      Active Straight Leg Raise with Quad Set - 10 reps - 2 sets - 2 hold - 2x daily      Supine Knee Extension Strengthening - 10 reps - 2 sets - 2 hold - 2x daily      Seated Long Arc Quad with Ankle Weight - 10 reps - 2 sets - 2 hold - 2x daily      Mini Squat with Counter Support - 10 reps - 2 sets - 5 hold - 2x daily      Wall Quarter Squat - 10 reps - 2 sets - 5 hold - 2x daily      Standing Heel Raise - 10 reps - 2 sets - 2 hold - 2x daily      Step Up - 10 reps - 2 sets - 1 hold - 2x daily      Standing Hip Flexion - 10 reps - 2 sets - 2 hold - 2x daily      Knee Flexion AAROM - 10 reps - 2 sets - 10 hold - 2x daily      Seated Knee Flexion AAROM - 10 reps - 2 sets - 10 hold - 3x daily      Seated Hamstring Stretch - 3 reps - 30 hold - 2x daily      Gastroc Stretch on Wall - 3 reps - 30 hold - 2x daily    Treatment/Session Summary:    · Response to Treatment:  tolerated initial treatment well with only mild pain on stretching. · Communication/Consultation:  HEP instruction, advice to continue use of ice  · Equipment provided today:  None today  · Recommendations/Intent for next treatment session: Next visit will focus on maximising ROM, improving strength, balance, gait pattern and stability.   Treatment Plan of Care Effective Dates:  3/11/2019 to 6/9/2019  Total Treatment Billable Duration:  60 mins  (including initial evaluation performed today)  PT Patient Time In/Time Out  Time In: 1100  Time Out: [address] [facility],[address], PT    Future Appointments   Date Time Provider Anton Hansen   3/14/2019 11:00 AM Fabiola Almonte, PT [facility]   3/18/2019 11:00 AM Fabiola Almonte, PT [facility]   3/21/2019 11:00 AM Fabiola Almonte, PT [facility]

## 2019-03-14 ENCOUNTER — HOSPITAL ENCOUNTER (OUTPATIENT)
Dept: PHYSICAL THERAPY | Age: 84
Discharge: HOME | End: 2019-03-14
Payer: MEDICARE

## 2019-03-14 PROCEDURE — 97110 THERAPEUTIC EXERCISES: CPT

## 2019-03-14 PROCEDURE — 97140 MANUAL THERAPY 1/> REGIONS: CPT

## 2019-03-14 NOTE — PROGRESS NOTES
Karen Cheek  : 1935  Primary: Zheng Calico [employer] Medicare Hmo  Secondary:  [address] at [facility] Therapy  [address], [address]  [phone]   [phone]        OUTPATIENT PHYSICAL THERAPY: Daily Treatment Note 3/14/2019  Pre-treatment Symptoms/Complaints:  States knee felt good after initial treatment but did have some increased pain the following day - better by the next day  Pain: Initial: Pain Intensity 1: 4  Post Session:  3/10   Medications Last Reviewed:  3/14/2019    Updated Objective Findings:  None Today   TREATMENT:   THERAPEUTIC EXERCISE: (40 minutes):  Exercises per grid below to improve mobility, strength and balance. Required moderate verbal and tactile cues to promote proper body alignment and promote proper body mechanics. Progressed resistance, range and repetitions as indicated. MANUAL THERAPY: (20 minutes): Joint mobilization and Soft tissue mobilization was utilized and necessary because of the patient's restricted joint motion, loss of articular motion and restricted motion of soft tissue. Pat mob, PROM/stretching as tolerated.  STM around knee and distal thigh   Date:  3/11/19 Date:  3/13/19 Date:     Activity/Exercise Parameters Parameters Parameters   QS 1 x 10 1 x 10    SLR's 2 x 10 2 x 10, 1#    SAQ's 2 x 10, 3# 2 x 12, 3#    LAQ's` 2 x 10, 3# 2 x 12, 3#    Sit to stand 1 x 10 2 x 10    Partial squats 1 x 10     Step-ups 2 x 10, 6\" 2 x 10, 6\"    Lat step-ups/overs  2 x 10, 6\"    Hamstring curl machine 2 x 10, 20# 2 x 12, 20#    3 way hip machine - hip flex 1 x 10, 17# 1 x 12, 17# B                                   - hip abd 1 x 10, 17# 1 x 12, 17# B    HC stretches slant board 2 x 30 sec 3 x 30 sec    SLS - balance 3 reps 3 reps    Nu-step 6 mins, level 4     Stationary bike  5 mins    Gait training to improve heel-toe gait pattern    Therapeutic Modalities: ice post/ant knee x 15 mins after exercise  [employer] Portal Access Code: [email or web address]  Exercises      Long Sitting Quad Set - 10 reps - 2 sets - 6 hold - 2x daily      Active Straight Leg Raise with Quad Set - 10 reps - 2 sets - 2 hold - 2x daily      Supine Knee Extension Strengthening - 10 reps - 2 sets - 2 hold - 2x daily      Seated Long Arc Quad with Ankle Weight - 10 reps - 2 sets - 2 hold - 2x daily      Mini Squat with Counter Support - 10 reps - 2 sets - 5 hold - 2x daily      Wall Quarter Squat - 10 reps - 2 sets - 5 hold - 2x daily      Standing Heel Raise - 10 reps - 2 sets - 2 hold - 2x daily      Step Up - 10 reps - 2 sets - 1 hold - 2x daily      Standing Hip Flexion - 10 reps - 2 sets - 2 hold - 2x daily      Knee Flexion AAROM - 10 reps - 2 sets - 10 hold - 2x daily      Seated Knee Flexion AAROM - 10 reps - 2 sets - 10 hold - 3x daily      Seated Hamstring Stretch - 3 reps - 30 hold - 2x daily      Gastroc Stretch on Wall - 3 reps - 30 hold - 2x daily    Treatment/Session Summary:    · Response to Treatment:  doing well, excellent ROM and minimal swelling. .  · Communication/Consultation:  brief review of HEP  · Equipment provided today:  None today  · Recommendations/Intent for next treatment session: Next visit will focus on maximising ROM, improving strength, balance, gait pattern and stability.   Treatment Plan of Care Effective Dates:  3/11/2019 to 6/9/2019  Total Treatment Billable Duration:  60 mins  PT Patient Time In/Time Out  Time In: 1100  Time Out: Billie 149, PT    Future Appointments   Date Time Provider Anton Hansen   3/18/2019 11:00 AM EDNA Conner   3/21/2019 11:00 AM [facility]

## 2019-03-18 ENCOUNTER — HOSPITAL ENCOUNTER (OUTPATIENT)
Dept: PHYSICAL THERAPY | Age: 84
Discharge: HOME | End: 2019-03-18
Payer: MEDICARE

## 2019-03-18 PROCEDURE — 97110 THERAPEUTIC EXERCISES: CPT

## 2019-03-18 PROCEDURE — 97140 MANUAL THERAPY 1/> REGIONS: CPT

## 2019-03-18 NOTE — PROGRESS NOTES
Manny Anthony  : 1935  Primary: Jaqueline Mcqueen Medicare o  Secondary:  [address]  at [address]  [address], [address]  [phone]   [phone]        OUTPATIENT PHYSICAL THERAPY: Daily Treatment Note 3/18/2019  Pre-treatment Symptoms/Complaints:  Other than having to  Religion and sleeping at night, knee seems to be doing well. Saw doctor Friday and had superior part of incision checked - states doctor poked it and is on antibiotics for ~ 1 week. Pain: Initial: Pain Intensity 1: (P) 3  Post Session:  3/10   Medications Last Reviewed:  3/18/2019  antibiotics    Updated Objective Findings:  125º knee flexion   TREATMENT:   THERAPEUTIC EXERCISE: (40 minutes):  Exercises per grid below to improve mobility, strength and balance. Required moderate verbal and tactile cues to promote proper body alignment and promote proper body mechanics. Progressed resistance, range and repetitions as indicated. MANUAL THERAPY: (20 minutes): Joint mobilization and Soft tissue mobilization was utilized and necessary because of the patient's restricted joint motion, loss of articular motion and restricted motion of soft tissue. Pat mob, PROM/stretching as tolerated.  STM around knee and distal thigh   Date:  3/11/19 Date:  3/13/19 Date:  3/18/19   Activity/Exercise Parameters Parameters Parameters   QS 1 x 10 1 x 10 1 x 10   SLR's 2 x 10 2 x 10, 1# 2 x 10, 1#   SAQ's 2 x 10, 3# 2 x 12, 3# 2 x 10, 4#   LAQ's` 2 x 10, 3# 2 x 12, 3# 2 x 10, 4#   Sit to stand 1 x 10 2 x 10 2 x 10   Partial squats 1 x 10     Step-ups 2 x 10, 6\" 2 x 10, 6\" 2 x 10, 8\"   Lat step-ups/overs  2 x 10, 6\" 2 x 10, 8\"   Step-downs   1 x 10, 8\"   Hamstring curl machine 2 x 10, 20# 2 x 12, 20# 2 x 12, 20#   Knee ext machine   2 x 8, 10#   3 way hip machine - hip flex 1 x 10, 17# 1 x 12, 17# B 1 x 15, 17# B                                  - hip abd 1 x 10, 17# 1 x 12, 17# B 1 x 15, 17# B                                  - hip ext   1 x 15, 30# B   HC stretches slant board 2 x 30 sec 3 x 30 sec 3 x 30 sec   SLS - balance 3 reps 3 reps 3 reps with head turns   Nu-step 6 mins, level 4  8 mins, level 4-5   Stationary bike  5 mins    Gait training to improve heel-toe gait pattern  Lateral walking, line dancing moves  Balance board  Therapeutic Modalities:   [employer] Portal     Access Code: [email or web address]  Exercises      Long Sitting Quad Set - 10 reps - 2 sets - 6 hold - 2x daily      Active Straight Leg Raise with Quad Set - 10 reps - 2 sets - 2 hold - 2x daily      Supine Knee Extension Strengthening - 10 reps - 2 sets - 2 hold - 2x daily      Seated Long Arc Quad with Ankle Weight - 10 reps - 2 sets - 2 hold - 2x daily      Mini Squat with Counter Support - 10 reps - 2 sets - 5 hold - 2x daily      Wall Quarter Squat - 10 reps - 2 sets - 5 hold - 2x daily      Standing Heel Raise - 10 reps - 2 sets - 2 hold - 2x daily      Step Up - 10 reps - 2 sets - 1 hold - 2x daily      Standing Hip Flexion - 10 reps - 2 sets - 2 hold - 2x daily      Knee Flexion AAROM - 10 reps - 2 sets - 10 hold - 2x daily      Seated Knee Flexion AAROM - 10 reps - 2 sets - 10 hold - 3x daily      Seated Hamstring Stretch - 3 reps - 30 hold - 2x daily      Gastroc Stretch on Wall - 3 reps - 30 hold - 2x daily  Treatment/Session Summary:    · Response to Treatment:  doing well, excellent ROM and minimal swelling. No increased pain after 2nd visit last week. · Communication/Consultation:  discussed exercise options at [facility]. To try pracitsing line dancing moves at home with counter support as needed  · Equipment provided today:  None today  · Recommendations/Intent for next treatment session: Next visit will focus on maximising ROM, improving strength, balance, gait pattern and stability. Patient to try senior sneakers class this week.  May be close to ready for discharge on HEP  Treatment Plan of Care Effective Dates:  3/11/2019 to 6/9/2019  Total Treatment Billable Duration:  60 mins  PT Patient Time In/Time Out  Time In: (P) 1100  Time Out: (P) 1200  Bushra Umanzor, PT    Future Appointments   Date Time Provider Anton Hansen   3/21/2019 11:00 AM All Little PT [facility]

## 2019-03-20 ENCOUNTER — HOSPITAL ENCOUNTER (OUTPATIENT)
Dept: PHYSICAL THERAPY | Age: 84
Discharge: HOME | End: 2019-03-20
Payer: MEDICARE

## 2019-03-20 PROCEDURE — 97110 THERAPEUTIC EXERCISES: CPT

## 2019-03-20 PROCEDURE — 97140 MANUAL THERAPY 1/> REGIONS: CPT

## 2019-03-20 NOTE — THERAPY EVALUATION
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  
Hydrocodone; Penicillins; and Sulfa (sulfonamide antibiotics) MD Orders: Evaluate and treat MEDICAL/REFERRING DIAGNOSIS: 
Presence of left artificial knee joint [Z96.652] DATE OF ONSET: 2019 REFERRING PHYSICIAN: Osei Youngblood MD 
RETURN PHYSICIAN APPOINTMENT: unknown INITIAL ASSESSMENT:  Ms. Kesha Felton presents with mildly limited ROM, decreased strength, decreased endurance, impaired balance and gait pattern following L TKA ~ 5 weeks ago. She reports only moderate pain/tightness and feels she is generally doing well and is anxious to be able to return to exercise program at [facility] including water aerobics and perhaps line dancing. She can benefit from skilled therapy to address above deficits to accomplish goals. She is a pleasant lady and is likely to progress well with rehabilitation program. 
DISCHARGE:  Ms. Kesha Felton has been seen for 4 visits for manual therapy, ROM and strengthening exercises, balance and gait training. She has made excellent progress and is ready for discharge on HEP and continuing with strengthening at [facility]. Goals have mostly been met. PROBLEM LIST (Impacting functional limitations): 1. Decreased Strength 2. Decreased ADL/Functional Activities 3. Decreased Ambulation Ability/Technique 4. Decreased Balance 5. Increased Pain 6. Decreased Activity Tolerance 7. Decreased Flexibility/Joint Mobility 8.  Decreased [place] with Home Exercise Program INTERVENTIONS PLANNED: (Treatment may consist of any combination of the following) 1. Balance/Proprioceptive Exercise 2. Home Exercise Program (HEP) 3. Manual Therapy 4. Range of Motion (ROM) 5. Therapeutic Exercise/Strengthening 6. Gait Training 7. Modalities/taping as indicated TREATMENT PLAN: 
Effective Dates: 3/11/2019 TO 6/9/2019 (90 days). Frequency/Duration: 2 times a week for 90 Day(s) Frequency/duration may be adjusted upon ongoing reassessment GOALS: (Goals have been discussed and agreed upon with patient.) Short-Term Functional Goals: Time Frame: 2 - 4 weeks 1. Decrease pain by 2/10 for improved sleep and increased WB tolerance - MET 2. Increase knee AROM to 0-125° for improved gait pattern and ease with sitting - MET 3. Patient to be able to perform 3 sets/10 step-ups on 8\" step for greater ease with stairs and functional strength. - MET 4. Establish independent HEP with minimal cueing to increase knee ROM and strength - MET Discharge Goals: Time Frame: 12 weeks 1. Increase knee AROM to 0-130 ° to improve gait, sitting and getting in/out of car - MET 2. Increase strength musculature to at least 4/5 for reciprocal stair climbing with rail, and ambulation > 20 minutes - MET 3. Improve score on LEFS by >9 points to allow return to advanced ADL's - NOT MET due to inaccurate reporting on initial LEFS 
4. [place] in advanced HEP with no cueing to maintain/improve gains made in therapy - patient to be able to return to water aerobics and regular gym program. - MET CLINICAL DECISION MAKING:  
OUTCOME MEASURE:  
Tool Used: Lower Extremity Functional Scale (LEFS) Score:  Initial: 69/80 Most Recent: 69/80 (Date: 3/20/19 ) Interpretation of Score: 20 questions each scored on a 5 point scale with 0 representing \"extreme difficulty or unable to perform\" and 4 representing \"no difficulty\". The lower the score, the greater the functional disability. 80/80 represents no disability.   Minimal detectable change is 9 points. MEDICAL NECESSITY:  
· Patient demonstrates excellent rehab potential due to higher previous functional level. · Patient is expected to demonstrate progress in strength, range of motion, balance and functional technique to increase independence with all LE ADL's. Use of outcome tool(s) and clinical judgement create a POC that gives a: Questionable prediction of patient's progress: MODERATE COMPLEXITY  
  
 
PT Patient Time In/Time Out Time In: 1100 Time Out: 1200 Thank you for this referral, 
Terence Babinski, PT    
  
 
HISTORY:  
History of Injury/Illness (Reason for Referral): 
Patient had L TKA on 2/4/19 - hospital 2 days followed by ~ 10 days at [facility] and further couple of weeks of home health therapy. States she had moderate pain - has not taken pain med since release from [facility]. Has started using stationary bike at [facility] a couple of days/week nut anxious to get back to regular exercise program including water aerobics and hopefully line dancing. Knee feels tight and has some difficulty sleeping. No stairs in house except from garage. Past Medical History/Comorbidities: Ms. 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  She also has no past medical history of Adverse effect of anesthesia, Aneurysm ([address] Arrhythmia, Asthma, Autoimmune disease ([address] CAD (coronary artery disease), Cancer ([address] Chronic kidney disease, Chronic obstructive pulmonary disease ([address] Chronic pain, Coagulation disorder ([address] Diabetes ([address] Difficult intubation, Endocarditis, GERD (gastroesophageal reflux disease), Heart failure ([address] Ill-defined condition, Liver disease, Malignant hyperthermia due to anesthesia, Morbid obesity ([address] Nausea & vomiting, Nicotine vapor product user, Non-nicotine vapor product user, Pseudocholinesterase deficiency, PUD (peptic ulcer disease), Rheumatic fever, Seizures ([address] Sleep apnea, Stroke ([address] Thromboembolus ([address] or Thyroid disease. Ms. Tyson Short  has a past surgical history that includes hx knee arthroscopy (Right); hx colonoscopy; hx knee replacement (Right, 01/24/2018); and hx cataract removal (Bilateral, 2018). tylenol for pain prn Social History/Living Environment:  
 lives with  who is older than her but in relatively good shape Prior Level of Function/Work/Activity: Moderately active, exercises 3 x/week Personal Factors:   
      Sex:  female Age:  82 y.o. Past/Current Experience:  Successful with PT after R TKA 1 year ago Ambulatory/Rehab Services H2 Model Falls Risk Assessment Risk Factors: 
     No Risk Factors Identified Click here to [place] to Rise from Chair: 
     (1)  Pushes up, successful in one attempt Falls Prevention Plan: No modifications necessary Total: (5 or greater = High Risk): 1 ©2010 [facility] of [address] Patent #[id number]. Federal Law prohibits the replication, distribution or use without written permission from [facility] [employer] Current Medications:   
  
Current Outpatient Medications:  
  HYDROmorphone (DILAUDID) 2 mg tablet, Take 1 Tab by mouth every four (4) hours as needed for Pain. Max Daily Amount: 12 mg., Disp: 40 Tab, Rfl: 0 
  ZINC ACETATE PO, Take 1 Tab by mouth daily. , Disp: , Rfl:  
  metoprolol succinate (TOPROL-XL) 25 mg XL tablet, Take 1 Tab by mouth nightly. Indications: hypertension, Disp: 90 Tab, Rfl: 3 
  amLODIPine (NORVASC) 2.5 mg tablet, Take 1 Tab by mouth daily. (Patient taking differently: Take 2.5 mg by mouth nightly.), Disp: 90 Tab, Rfl: 3 
  losartan-hydroCHLOROthiazide (HYZAAR) 100-25 mg per tablet, Take 1 Tab by mouth daily. , Disp: 90 Tab, Rfl: 3   multivitamin (ONE A DAY) tablet, Take 1 Tab by mouth daily. , Disp: , Rfl:  
  calcium-cholecalciferol, d3, (CALCIUM 600 + D) 600-125 mg-unit tab, Take 1 Tab by mouth daily. , Disp: , Rfl:  
  temazepam (RESTORIL) 15 mg capsule, TAKE ONE CAPSULE BY MOUTH AT BEDTIME,as needed, Disp: , Rfl: 0 
  citalopram (CELEXA) 10 mg tablet, Take 10 mg by mouth daily. Per anesthesia protocol:instructed to take am of surgery. Indications: ANXIETY WITH DEPRESSION, Disp: , Rfl:   
Date Last Reviewed:  3/20/2019 Number of Personal Factors/Comorbidities that affect the Plan of Care: 1-2: MODERATE COMPLEXITY EXAMINATION:  
Observation/Orthostatic Postural Assessment:   
     Patient ambulating to PT with normal gait pattern. Mild swelling, well-healed midline surgical incision. Palpation:   
      Mildly tender over lateral aspect knee, ITB area. Pat mob essentially WDL 
ROM:  
   
  
  
  
LLE AROM 
L Knee Flexion: 620 L Knee Extension: 0 Strength: LLE Strength L Hip Flexion: 4 L Hip Extension: 4 L Hip ABduction: 4- 
L Knee Flexion: 4+ 
L Knee Extension: 4 L Ankle Plantar Flexion: 4 Special Tests:        NA 
Neurological Screen:       Sensation: WDL Functional Mobility:       Transfers: sit to stand from regular height chair with ease Stairs: able to negotiate normal stairs with rail for safety only Balance:  Good - 8-10 sec SLS Body Structures Involved: 1. Bones 2. Joints 3. Muscles 4. Ligaments Body Functions Affected: 1. Sensory/Pain 2. Neuromusculoskeletal 
3. Movement Related Activities and Participation Affected: 1. General Tasks and Demands 2. Mobility 3. Self Care 4. Domestic Life 5. Community, Social and [place] Number of elements (examined above) that affect the Plan of Care: 3: MODERATE COMPLEXITY CLINICAL PRESENTATION:  
Presentation: Evolving clinical presentation with changing clinical characteristics: MODERATE COMPLEXITY Assessment Complexity Level Charged: Moderate Complexity [32284]

## 2019-03-20 NOTE — THERAPY DISCHARGE
*  Arianna Carrasco  : 1935  Primary: Delio Mcqueen Medicare Hmo  Secondary:  [address] at [facility]  [address], [address]  [phone]   [phone]       OUTPATIENT PHYSICAL THERAPY:Discharge Summary 3/20/2019   ICD-10: Treatment Diagnosis: Difficulty in walking, not elsewhere classified (R26.2)  Pain in left knee (M25.562)  Presence of left artificial knee joint (A13.658)  Precautions/Allergies:   Hydrocodone; Penicillins; and Sulfa (sulfonamide antibiotics)   MD Orders: Evaluate and treat MEDICAL/REFERRING DIAGNOSIS:  Presence of left artificial knee joint [Z96.652]   DATE OF ONSET: 2019  REFERRING PHYSICIAN: Radames Lancaster MD  RETURN PHYSICIAN APPOINTMENT: unknown     INITIAL ASSESSMENT:  Ms. Cyrus Mitchell presents with mildly limited ROM, decreased strength, decreased endurance, impaired balance and gait pattern following L TKA ~ 5 weeks ago. She reports only moderate pain/tightness and feels she is generally doing well and is anxious to be able to return to exercise program at [facility] including water aerobics and perhaps line dancing. She can benefit from skilled therapy to address above deficits to accomplish goals. She is a pleasant lady and is likely to progress well with rehabilitation program.  DISCHARGE:  Ms. Cyrus Mitchell has been seen for 4 visits for manual therapy, ROM and strengthening exercises, balance and gait training. She has made excellent progress and is ready for discharge on HEP and continuing with strengthening at [facility]. Goals have mostly been met. PROBLEM LIST (Impacting functional limitations):  1. Decreased Strength  2. Decreased ADL/Functional Activities  3. Decreased Ambulation Ability/Technique  4. Decreased Balance  5. Increased Pain  6. Decreased Activity Tolerance  7. Decreased Flexibility/Joint Mobility  8.  Decreased [place] with Home Exercise Program INTERVENTIONS PLANNED: (Treatment may consist of any combination of the following)  1. Balance/Proprioceptive Exercise  2. Home Exercise Program (HEP)  3. Manual Therapy  4. Range of Motion (ROM)  5. Therapeutic Exercise/Strengthening  6. Gait Training  7. Modalities/taping as indicated   TREATMENT PLAN:  Effective Dates: 3/11/2019 TO 6/9/2019 (90 days). Frequency/Duration: 2 times a week for 90 Day(s)  Frequency/duration may be adjusted upon ongoing reassessment  GOALS: (Goals have been discussed and agreed upon with patient.)  Short-Term Functional Goals: Time Frame: 2 - 4 weeks  1. Decrease pain by 2/10 for improved sleep and increased WB tolerance - MET  2. Increase knee AROM to 0-125° for improved gait pattern and ease with sitting - MET  3. Patient to be able to perform 3 sets/10 step-ups on 8\" step for greater ease with stairs and functional strength. - MET  4. Establish independent HEP with minimal cueing to increase knee ROM and strength - MET  Discharge Goals: Time Frame: 12 weeks  1. Increase knee AROM to 0-130 ° to improve gait, sitting and getting in/out of car - MET  2. Increase strength musculature to at least 4/5 for reciprocal stair climbing with rail, and ambulation > 20 minutes - MET  3. Improve score on LEFS by >9 points to allow return to advanced ADL's - NOT MET due to inaccurate reporting on initial LEFS  4. [place] in advanced HEP with no cueing to maintain/improve gains made in therapy - patient to be able to return to water aerobics and regular gym program. - MET   CLINICAL DECISION MAKING:   OUTCOME MEASURE:   Tool Used: Lower Extremity Functional Scale (LEFS)  Score:  Initial: 69/80 Most Recent: 69/80 (Date: 3/20/19 )   Interpretation of Score: 20 questions each scored on a 5 point scale with 0 representing \"extreme difficulty or unable to perform\" and 4 representing \"no difficulty\". The lower the score, the greater the functional disability. 80/80 represents no disability. Minimal detectable change is 9 points.   MEDICAL NECESSITY: · Patient demonstrates excellent rehab potential due to higher previous functional level. · Patient is expected to demonstrate progress in strength, range of motion, balance and functional technique to increase independence with all LE ADL's. Use of outcome tool(s) and clinical judgement create a POC that gives a: Questionable prediction of patient's progress: MODERATE COMPLEXITY        PT Patient Time In/Time Out  Time In: 1100  Time Out: 1200    Thank you for this referral,  Jimy Felton, PT          HISTORY:   History of Injury/Illness (Reason for Referral):  Patient had L TKA on 2/4/19 - hospital 2 days followed by ~ 10 days at [facility] and further couple of weeks of home health therapy. States she had moderate pain - has not taken pain med since release from [facility]. Has started using stationary bike at [facility] a couple of days/week nut anxious to get back to regular exercise program including water aerobics and hopefully line dancing. Knee feels tight and has some difficulty sleeping. No stairs in house except from garage. Past Medical History/Comorbidities:   Ms. 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  She also has no past medical history of Adverse effect of anesthesia, Aneurysm ([address] Arrhythmia, Asthma, Autoimmune disease ([address] CAD (coronary artery disease), Cancer ([address] Chronic kidney disease, Chronic obstructive pulmonary disease ([address] Chronic pain, Coagulation disorder ([address] Diabetes ([address] Difficult intubation, Endocarditis, GERD (gastroesophageal reflux disease), Heart failure ([address] Ill-defined condition, Liver disease, Malignant hyperthermia due to anesthesia, Morbid obesity ([address] Nausea & vomiting, Nicotine vapor product user, Non-nicotine vapor product user, Pseudocholinesterase deficiency, PUD (peptic ulcer disease), Rheumatic fever, Seizures ([facility] [address] Sleep apnea, Stroke ([facility] [facility] [address] Thromboembolus ([facility] or Thyroid disease. Ms. Tsering Conley  has a past surgical history that includes hx knee arthroscopy (Right); hx colonoscopy; hx knee replacement (Right, 01/24/2018); and hx cataract removal (Bilateral, 2018). tylenol for pain prn  Social History/Living Environment:    lives with  who is older than her but in relatively good shape  Prior Level of Function/Work/Activity:  Moderately active, exercises 3 x/week  Personal Factors:          Sex:  female        Age:  82 y.o. Past/Current Experience:  Successful with PT after R TKA 1 year ago   Ambulatory/Rehab Services H2 Model Falls Risk Assessment   Risk Factors:       No Risk Factors Identified  Click here to CLEAR selections Ability to Rise from Chair:       (1)  Pushes up, successful in one attempt   Falls Prevention Plan:       No modifications necessary   Total: (5 or greater = High Risk): 1   ©2010 [facility] [employer]. All Rights Reserved. [facility] Patent #[id number]. Federal Law prohibits the replication, distribution or use without written permission from [facility]   Current Medications:       Current Outpatient Medications:     HYDROmorphone (DILAUDID) 2 mg tablet, Take 1 Tab by mouth every four (4) hours as needed for Pain. Max Daily Amount: 12 mg., Disp: 40 Tab, Rfl: 0    ZINC ACETATE PO, Take 1 Tab by mouth daily. , Disp: , Rfl:     metoprolol succinate (TOPROL-XL) 25 mg XL tablet, Take 1 Tab by mouth nightly. Indications: hypertension, Disp: 90 Tab, Rfl: 3    amLODIPine (NORVASC) 2.5 mg tablet, Take 1 Tab by mouth daily. (Patient taking differently: Take 2.5 mg by mouth nightly.), Disp: 90 Tab, Rfl: 3    losartan-hydroCHLOROthiazide (HYZAAR) 100-25 mg per tablet, Take 1 Tab by mouth daily. , Disp: 90 Tab, Rfl: 3    multivitamin (ONE A DAY) tablet, Take 1 Tab by mouth daily. , Disp: , Rfl:     calcium-cholecalciferol, d3, (CALCIUM 600 + D) 600-125 mg-unit tab, Take 1 Tab by mouth daily. , Disp: , Rfl:     temazepam (RESTORIL) 15 mg capsule, TAKE ONE CAPSULE BY MOUTH AT BEDTIME,as needed, Disp: , Rfl: 0    citalopram (CELEXA) 10 mg tablet, Take 10 mg by mouth daily. Per anesthesia protocol:instructed to take am of surgery. Indications: ANXIETY WITH DEPRESSION, Disp: , Rfl:    Date Last Reviewed:  3/20/2019   Number of Personal Factors/Comorbidities that affect the Plan of Care: 1-2: MODERATE COMPLEXITY   EXAMINATION:   Observation/Orthostatic Postural Assessment:         Patient ambulating to PT with normal gait pattern. Mild swelling, well-healed midline surgical incision. Palpation:          Mildly tender over lateral aspect knee, ITB area. Pat mob essentially WDL  ROM:                LLE AROM  L Knee Flexion: 133  L Knee Extension: 0                   Strength:       LLE Strength  L Hip Flexion: 4  L Hip Extension: 4  L Hip ABduction: 4-  L Knee Flexion: 4+  L Knee Extension: 4  L Ankle Plantar Flexion: 4                  Special Tests:        NA  Neurological Screen:       Sensation: WDL  Functional Mobility:       Transfers: sit to stand from regular height chair with ease         Stairs: able to negotiate normal stairs with rail for safety only  Balance:  Good - 8-10 sec SLS   Body Structures Involved:  1. Bones  2. Joints  3. Muscles  4. Ligaments Body Functions Affected:  1. Sensory/Pain  2. Neuromusculoskeletal  3. Movement Related Activities and Participation Affected:  1. General Tasks and Demands  2. Mobility  3. Self Care  4. Domestic Life  5. Community, Social and [place]   Number of elements (examined above) that affect the Plan of Care: 3: MODERATE COMPLEXITY   CLINICAL PRESENTATION:   Presentation: Evolving clinical presentation with changing clinical characteristics: MODERATE COMPLEXITY   Assessment Complexity Level Charged:  Moderate Complexity [17989]

## 2019-03-20 NOTE — PROGRESS NOTES
Winter James  : 1935  Primary: Nikita Mcqueen Medicare Hmo  Secondary:  [address] at [facility]  [address], [address]  [phone]   [phone]        OUTPATIENT PHYSICAL THERAPY: Daily Treatment Note 3/20/2019  Pre-treatment Symptoms/Complaints:  Patient reports she is doing well and anxious to get back to normal routine  Pain: Initial: Pain Intensity 1: 2  Post Session:  2/10   Medications Last Reviewed:  3/20/2019  antibiotics  Updated Objective Findings:  133º   TREATMENT:   THERAPEUTIC EXERCISE: (40 minutes):  Exercises per grid below to improve mobility, strength and balance. Required moderate verbal and tactile cues to promote proper body alignment and promote proper body mechanics. Progressed resistance, range and repetitions as indicated. MANUAL THERAPY: (20 minutes): Joint mobilization and Soft tissue mobilization was utilized and necessary because of the patient's restricted joint motion, loss of articular motion and restricted motion of soft tissue. Pat mob, PROM/stretching as tolerated.  STM around knee and distal thigh   Date:  3/13/19 Date:  3/18/19 Date:  3/20/19   Activity/Exercise Parameters Parameters Parameters   QS 1 x 10 1 x 10 1 x 12   SLR's 2 x 10, 1# 2 x 10, 1# 2 x 12, 1#   SAQ's 2 x 12, 3# 2 x 10, 4# 2 x 10, 5#   LAQ's` 2 x 12, 3# 2 x 10, 4# 2 x 10, 5#   Sit to stand 2 x 10 2 x 10 2 x 10, faster speed   Partial squats   1 x 12   Step-ups 2 x 10, 6\" 2 x 10, 8\" 2 x 10, 8\"   Lat step-ups/overs 2 x 10, 6\" 2 x 10, 8\" 3 x 10, 8\"   Step-downs  1 x 10, 8\" 1 x 10, 8\"   Hamstring curl machine 2 x 12, 20# 2 x 12, 20# 2 x 10, 25#   Knee ext machine  2 x 8, 10# 2 x 10, 10#   3 way hip machine - hip flex 1 x 12, 17# B 1 x 15, 17# B 1 x 15, 17# B                                  - hip abd 1 x 12, 17# B 1 x 15, 17# B 1 x 15, 17# B                                  - hip ext  1 x 15, 30# B 1 x 15, 30# B   HC stretches slant board 3 x 30 sec 3 x 30 sec 3 x 30 sec   SLS - balance 3 reps 3 reps with head turns 3 reps with head turns   Nu-step  8 mins, level 4-5    Stationary bike 5 mins     Treadmill   8 mins, 1.8 - 1.9 mph   Gait training to improve heel-toe gait pattern  Lateral walking, line dancing moves  Balance board  Therapeutic Modalities:   [employer]     Access Code: [email or web address]  Treatment/Session Summary:    · Response to Treatment:  doing well, excellent ROM and minimal swelling. Communication/Consultation:  reviewed exercise options at [facility]. · Equipment provided today:  None today  Recommendations/Intent for next treatment session: Patient doing very well and ready for discharge on HEP at continuing with strengthening at gym. Treatment Plan of Care Effective Dates:  3/11/2019 to 6/9/2019  Total Treatment Billable Duration:  60 mins  PT Patient Time In/Time Out  Time In: 1100  Time Out: [address] [facility],[address], PT    No future appointments.

## 2019-03-21 ENCOUNTER — APPOINTMENT (OUTPATIENT)
Dept: PHYSICAL THERAPY | Age: 84
End: 2019-03-21
Payer: MEDICARE

## 2019-09-24 PROBLEM — Z01.810 PREOP CARDIOVASCULAR EXAM: Status: RESOLVED | Noted: 2017-11-21 | Resolved: 2019-09-24

## 2019-12-12 NOTE — PERIOP NOTES
Open chart to review to ensure pt cleared for surgery
TRANSFER - IN REPORT:    Verbal report received from Maricruz RN(name) on Aydin Marinelli  being received from joint [place](unit) for routine progression of care      Report consisted of patients Situation, Background, Assessment and   Recommendations(SBAR). Information from the following report(s) SBAR, Kardex and MAR was reviewed with the receiving nurse. Opportunity for questions and clarification was provided.
TRANSFER - OUT REPORT:    Verbal report given to [facility] RN on [employer]  being transferred to [facility] for routine progression of care       Report consisted of patients Situation, Background, Assessment and   Recommendations(SBAR). Information from the following report(s) SBAR was reviewed with the receiving nurse. Lines:   Peripheral IV 00/49/62 Left Cephalic (Active)   Site Assessment Clean, dry, & intact 1/24/2018  9:57 AM   Phlebitis Assessment 0 1/24/2018  9:57 AM   Infiltration Assessment 0 1/24/2018  9:57 AM   Dressing Status Clean, dry, & intact 1/24/2018  9:57 AM   Dressing Type Tape;Transparent 1/24/2018  9:57 AM   Hub Color/Line Status Green; Infusing 1/24/2018  9:57 AM        Opportunity for questions and clarification was provided.       Patient transported with:   O2 @ 2 liters
Teach back method used with patient concerning hibiclens wash, TB screening, incentive spirometer , pain management and educated pt and family on home discharge needs list
Yes

## 2021-10-19 ENCOUNTER — APPOINTMENT (OUTPATIENT)
Dept: CT IMAGING | Age: 86
End: 2021-10-19
Attending: STUDENT IN AN ORGANIZED HEALTH CARE EDUCATION/TRAINING PROGRAM
Payer: MEDICARE

## 2021-10-19 ENCOUNTER — HOSPITAL ENCOUNTER (EMERGENCY)
Age: 86
Discharge: HOME | End: 2021-10-20
Attending: STUDENT IN AN ORGANIZED HEALTH CARE EDUCATION/TRAINING PROGRAM
Payer: MEDICARE

## 2021-10-19 VITALS
DIASTOLIC BLOOD PRESSURE: 48 MMHG | RESPIRATION RATE: 18 BRPM | HEART RATE: 61 BPM | TEMPERATURE: 98.1 F | WEIGHT: 126 LBS | SYSTOLIC BLOOD PRESSURE: 105 MMHG | OXYGEN SATURATION: 100 % | BODY MASS INDEX: 21.51 KG/M2 | HEIGHT: 64 IN
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
## 2021-10-19 DIAGNOSIS — S01.81XA FACIAL LACERATION, INITIAL ENCOUNTER: ICD-10-CM

## 2021-10-19 PROCEDURE — 80053 COMPREHEN METABOLIC PANEL: CPT

## 2021-10-19 PROCEDURE — 90471 IMMUNIZATION ADMIN: CPT

## 2021-10-19 PROCEDURE — 75810000293 HC SIMP/SUPERF WND  RPR

## 2021-10-19 PROCEDURE — 85025 COMPLETE CBC W/AUTO DIFF WBC: CPT

## 2021-10-19 PROCEDURE — 93005 ELECTROCARDIOGRAM TRACING: CPT | Performed by: STUDENT IN AN ORGANIZED HEALTH CARE EDUCATION/TRAINING PROGRAM

## 2021-10-19 PROCEDURE — 99284 EMERGENCY DEPT VISIT MOD MDM: CPT

## 2021-10-19 PROCEDURE — 96361 HYDRATE IV INFUSION ADD-ON: CPT

## 2021-10-19 PROCEDURE — 96360 HYDRATION IV INFUSION INIT: CPT

## 2021-10-19 RX ORDER — SODIUM CHLORIDE 0.9 % (FLUSH) 0.9 %
5-10 SYRINGE (ML) INJECTION EVERY 8 HOURS
Status: DISCONTINUED | OUTPATIENT
Start: 2021-10-19 | End: 2021-10-20 | Stop reason: HOSPADM

## 2021-10-19 RX ORDER — SODIUM CHLORIDE 0.9 % (FLUSH) 0.9 %
5-10 SYRINGE (ML) INJECTION AS NEEDED
Status: DISCONTINUED | OUTPATIENT
Start: 2021-10-19 | End: 2021-10-20 | Stop reason: HOSPADM

## 2021-10-20 ENCOUNTER — APPOINTMENT (OUTPATIENT)
Dept: GENERAL RADIOLOGY | Age: 86
End: 2021-10-20
Attending: STUDENT IN AN ORGANIZED HEALTH CARE EDUCATION/TRAINING PROGRAM
Payer: MEDICARE

## 2021-10-20 ENCOUNTER — APPOINTMENT (OUTPATIENT)
Dept: CT IMAGING | Age: 86
End: 2021-10-20
Attending: STUDENT IN AN ORGANIZED HEALTH CARE EDUCATION/TRAINING PROGRAM
Payer: MEDICARE

## 2021-10-20 LAB
ALBUMIN SERPL-MCNC: 2.9 G/DL (ref 3.2–4.6)
ALBUMIN/GLOB SERPL: 1.1 {RATIO} (ref 1.2–3.5)
ALP SERPL-CCNC: 46 U/L (ref 50–136)
ALT SERPL-CCNC: 24 U/L (ref 12–65)
ANION GAP SERPL CALC-SCNC: 4 MMOL/L (ref 7–16)
ANION GAP SERPL CALC-SCNC: 6 MMOL/L (ref 7–16)
AST SERPL-CCNC: 13 U/L (ref 15–37)
ATRIAL RATE: 93 BPM
BASOPHILS # BLD: 0 K/UL (ref 0–0.2)
BASOPHILS NFR BLD: 0 % (ref 0–2)
BILIRUB SERPL-MCNC: 0.5 MG/DL (ref 0.2–1.1)
BUN SERPL-MCNC: 42 MG/DL (ref 8–23)
BUN SERPL-MCNC: 50 MG/DL (ref 8–23)
CALCIUM SERPL-MCNC: 8 MG/DL (ref 8.3–10.4)
CALCIUM SERPL-MCNC: 8.3 MG/DL (ref 8.3–10.4)
CALCULATED R AXIS, ECG10: -39 DEGREES
CALCULATED T AXIS, ECG11: 14 DEGREES
CHLORIDE SERPL-SCNC: 100 MMOL/L (ref 98–107)
CHLORIDE SERPL-SCNC: 104 MMOL/L (ref 98–107)
CO2 SERPL-SCNC: 30 MMOL/L (ref 21–32)
CO2 SERPL-SCNC: 30 MMOL/L (ref 21–32)
CREAT SERPL-MCNC: 0.98 MG/DL (ref 0.6–1)
CREAT SERPL-MCNC: 1.33 MG/DL (ref 0.6–1)
DIAGNOSIS, 93000: NORMAL
DIFFERENTIAL METHOD BLD: ABNORMAL
EOSINOPHIL # BLD: 0.1 K/UL (ref 0–0.8)
EOSINOPHIL NFR BLD: 1 % (ref 0.5–7.8)
ERYTHROCYTE [DISTWIDTH] IN BLOOD BY AUTOMATED COUNT: 13.8 % (ref 11.9–14.6)
GLOBULIN SER CALC-MCNC: 2.6 G/DL (ref 2.3–3.5)
GLUCOSE SERPL-MCNC: 122 MG/DL (ref 65–100)
GLUCOSE SERPL-MCNC: 187 MG/DL (ref 65–100)
HCT VFR BLD AUTO: 27.3 % (ref 35.8–46.3)
HGB BLD-MCNC: 8.8 G/DL (ref 11.7–15.4)
IMM GRANULOCYTES # BLD AUTO: 0.1 K/UL (ref 0–0.5)
IMM GRANULOCYTES NFR BLD AUTO: 1 % (ref 0–5)
LYMPHOCYTES # BLD: 1.9 K/UL (ref 0.5–4.6)
LYMPHOCYTES NFR BLD: 26 % (ref 13–44)
MCH RBC QN AUTO: 29.5 PG (ref 26.1–32.9)
MCHC RBC AUTO-ENTMCNC: 32.2 G/DL (ref 31.4–35)
MCV RBC AUTO: 91.6 FL (ref 79.6–97.8)
MONOCYTES # BLD: 0.7 K/UL (ref 0.1–1.3)
MONOCYTES NFR BLD: 9 % (ref 4–12)
NEUTS SEG # BLD: 4.6 K/UL (ref 1.7–8.2)
NEUTS SEG NFR BLD: 62 % (ref 43–78)
NRBC # BLD: 0 K/UL (ref 0–0.2)
PLATELET # BLD AUTO: 130 K/UL (ref 150–450)
PMV BLD AUTO: 11.9 FL (ref 9.4–12.3)
POTASSIUM SERPL-SCNC: 3.1 MMOL/L (ref 3.5–5.1)
POTASSIUM SERPL-SCNC: 3.3 MMOL/L (ref 3.5–5.1)
PROT SERPL-MCNC: 5.5 G/DL (ref 6.3–8.2)
Q-T INTERVAL, ECG07: 416 MS
QRS DURATION, ECG06: 94 MS
QTC CALCULATION (BEZET), ECG08: 432 MS
RBC # BLD AUTO: 2.98 M/UL (ref 4.05–5.2)
SODIUM SERPL-SCNC: 136 MMOL/L (ref 136–145)
SODIUM SERPL-SCNC: 138 MMOL/L (ref 136–145)
VENTRICULAR RATE, ECG03: 65 BPM
WBC # BLD AUTO: 7.4 K/UL (ref 4.3–11.1)

## 2021-10-20 PROCEDURE — 96361 HYDRATE IV INFUSION ADD-ON: CPT

## 2021-10-20 PROCEDURE — 71045 X-RAY EXAM CHEST 1 VIEW: CPT

## 2021-10-20 PROCEDURE — 74011250636 HC RX REV CODE- 250/636: Performed by: STUDENT IN AN ORGANIZED HEALTH CARE EDUCATION/TRAINING PROGRAM

## 2021-10-20 PROCEDURE — 70450 CT HEAD/BRAIN W/O DYE: CPT

## 2021-10-20 PROCEDURE — 72125 CT NECK SPINE W/O DYE: CPT

## 2021-10-20 PROCEDURE — 90715 TDAP VACCINE 7 YRS/> IM: CPT | Performed by: STUDENT IN AN ORGANIZED HEALTH CARE EDUCATION/TRAINING PROGRAM

## 2021-10-20 PROCEDURE — 96360 HYDRATION IV INFUSION INIT: CPT

## 2021-10-20 PROCEDURE — 80048 BASIC METABOLIC PNL TOTAL CA: CPT

## 2021-10-20 PROCEDURE — 90471 IMMUNIZATION ADMIN: CPT

## 2021-10-20 RX ADMIN — TETANUS TOXOID, REDUCED DIPHTHERIA TOXOID AND ACELLULAR PERTUSSIS VACCINE, ADSORBED 0.5 ML: 5; 2.5; 8; 8; 2.5 SUSPENSION INTRAMUSCULAR at 00:46

## 2021-10-20 RX ADMIN — SODIUM CHLORIDE 500 ML: 900 INJECTION, SOLUTION INTRAVENOUS at 02:14

## 2021-10-20 RX ADMIN — SODIUM CHLORIDE 1000 ML: 900 INJECTION, SOLUTION INTRAVENOUS at 00:11

## 2021-10-20 NOTE — ED NOTES
I have reviewed discharge instructions with the patient. The patient verbalized understanding. Patient left ED via Discharge Method: ambulatory to Home with lyft. Opportunity for questions and clarification provided. Patient given 0 scripts. To continue your aftercare when you leave the hospital, you may receive an automated call from our care team to check in on how you are doing. This is a free service and part of our promise to provide the best care and service to meet your aftercare needs.  If you have questions, or wish to unsubscribe from this service please call [phone]. Thank you for Choosing our [facility] Emergency Department.

## 2021-10-20 NOTE — ED TRIAGE NOTES
Pt had syncopal episode and fell from standing. Hit head on counter. Hypotensive with EMS.  Has laceration above right eye. Bleeding controlled in triage. done

## 2021-10-20 NOTE — DISCHARGE INSTRUCTIONS
Continue to orally hydrate with clear liquids. Follow-up with family physician as needed. Return to the ER in 5 days for suture removal.  Sooner for worsening or worrisome symptoms.

## 2021-10-20 NOTE — ED PROVIDER NOTES
59-year-old female presents to the emergency department after a fall at home. Patient states she does not recall exactly what happened. States she was walking back to the bedroom wearing slippers with a glass of ice water, reports waking up on the floor with a shattered glass of water and blood on the floor. Patient was able to ambulate on her own. Reports pain to the right forehead with there is a laceration. Unclear if tetanus immunization is up-to-date. Patient denies muscle strength or sensation abnormalities in extremities. Denies being on blood thinners. Denies back pain hip or lower extremity pain. Denies any other discomfort besides her forehead. Past Medical History:   Diagnosis Date    Arthritis     osteo    Ear problems     Hypertension     on med for control     Insomnia     Murmur     Murmur (Soft VIOLETTA RUSB, Soft TR murmur) heard per cardiology office note dated 05/22/18 (Dr. Krystin Duncan)    Psychiatric disorder     anxiety- citalopram daily    Status post total knee replacement, left 2/4/2019    Status post total right knee replacement 1/24/2018       Past Surgical History:   Procedure Laterality Date    HX CATARACT REMOVAL Bilateral 2018    HX COLONOSCOPY      HX KNEE ARTHROSCOPY Right     HX KNEE REPLACEMENT Right 01/24/2018         Family History:   Problem Relation Age of Onset    Cancer Mother     Heart Disease Father        Social History     Socioeconomic History    Marital status:      Spouse name: Not on file    Number of children: Not on file    Years of education: Not on file    Highest education level: Not on file   Occupational History    Not on file   Tobacco Use    Smoking status: Never Smoker    Smokeless tobacco: Never Used   Substance and Sexual Activity    Alcohol use:  Yes     Alcohol/week: 2.0 standard drinks     Types: 2 Glasses of wine per week    Drug use: No    Sexual activity: Not on file   Other Topics Concern    Not on file Social History Narrative    Not on file     Social Determinants of Health     Financial Resource Strain:     Difficulty of Paying Living Expenses:    Food Insecurity:     Worried About Running Out of Food in the Last Year:     [address] in the Last Year:    Transportation Needs:     Lack of Transportation (Medical):  Lack of Transportation (Non-Medical):    Physical Activity:     Days of Exercise per Week:     Minutes of Exercise per Session:    Stress:     Feeling of Stress :    Social Connections:     Frequency of Communication with Friends and Family:     Frequency of Social Gatherings with Friends and Family:     Attends Taoist Services:     Active Member of Clubs or Organizations:     Attends Club or Organization Meetings:     Marital Status:    Intimate Partner Violence:     Fear of Current or Ex-Partner:     Emotionally Abused:     Physically Abused:     Sexually Abused: ALLERGIES: Hydrocodone, Penicillins, and Sulfa (sulfonamide antibiotics)    Review of Systems   Constitutional: Negative for chills and fever. HENT: Negative for sinus pressure and sore throat. Eyes: Negative for visual disturbance. Respiratory: Negative for cough and shortness of breath. Cardiovascular: Negative for chest pain. Gastrointestinal: Negative for abdominal pain, diarrhea, nausea and vomiting. Endocrine: Negative for polyuria. Genitourinary: Negative for difficulty urinating and dysuria. Musculoskeletal: Negative for neck pain and neck stiffness. Skin: Positive for wound. Negative for rash. Neurological: Positive for headaches. Negative for weakness. Psychiatric/Behavioral: Negative for confusion. All other systems reviewed and are negative. Vitals:    10/19/21 2337   BP: (!) 105/48   Pulse: 61   Resp: 18   Temp: 98.1 °F (36.7 °C)   SpO2: 100%   Weight: 57.2 kg (126 lb)   Height: 5' 4\" (1.626 m)            Physical Exam  Vitals and nursing note reviewed. Constitutional:       Appearance: Normal appearance. She is not ill-appearing or toxic-appearing. HENT:      Head: Normocephalic. Nose:      Comments: Deviated septum, baseline per patient, no evidence of septal hematoma. Mouth/Throat:      Mouth: Mucous membranes are moist.      Comments: Mucosal aspect of the upper lip with scattered abrasion, no evidence of deep laceration, some edema and ecchymosis present to the left side of the upper lip from local trauma. Eyes:      Extraocular Movements: Extraocular movements intact. Pupils: Pupils are equal, round, and reactive to light. Cardiovascular:      Rate and Rhythm: Normal rate and regular rhythm. Pulses: Normal pulses. Heart sounds: Normal heart sounds. Pulmonary:      Effort: Pulmonary effort is normal. No respiratory distress. Breath sounds: Normal breath sounds. Abdominal:      General: Abdomen is flat. There is no distension. Palpations: Abdomen is soft. Tenderness: There is no abdominal tenderness. Musculoskeletal:         General: Normal range of motion. Cervical back: Normal range of motion. No rigidity. Skin:     General: Skin is warm and dry. Findings: Laceration present. Comments: 4 cm linear, diagonally oriented laceration located across the lateral aspect of the right eyebrow. Retained ability to elevate eyebrows. Normal sensation. Neurological:      General: No focal deficit present. Mental Status: She is alert and oriented to person, place, and time. Cranial Nerves: No cranial nerve deficit. Sensory: No sensory deficit. Motor: No weakness. Psychiatric:         Mood and Affect: Mood normal.          MDM  Number of Diagnoses or Management Options  Facial laceration, initial encounter  Fall, initial encounter  Diagnosis management comments: 68-year-old female presents to the ER for evaluation after a fall with LOC at home.   Patient believes she may have tripped but is unsure. Patient placed in a cervical spine collar given her head trauma. Tetanus immunization updated. CT patient's head as well as cervical spine ordered. Patient initially was hypotensive with EMS. Patient does endorse decreased fluid intake after working out earlier today. Blood pressure currently stable but will give 1.5 L bolus IV fluid. Labs show white count 7.4, baseline H&H compared to labs from 1.5 years ago. Grossly normal electrolytes, BUN 50, creatinine 1.33, GFR 40, this is changed from patient's baseline again from 1.5 years ago. After IV fluids, repeat BMP showed normalization of patient's kidney function. Laceration was repaired see procedure note for details. CT scan without any emergent findings. Orthostatic vital signs were normal.  Patient will return to the ER in 5 days for suture removal.  Sooner for worsening or worrisome symptoms. She voiced understanding and agreement with this plan. Amount and/or Complexity of Data Reviewed  Clinical lab tests: ordered and reviewed  Tests in the radiology section of CPT®: reviewed and ordered  Independent visualization of images, tracings, or specimens: yes    Risk of Complications, Morbidity, and/or Mortality  Presenting problems: moderate  Diagnostic procedures: moderate  Management options: moderate           Wound Repair    Date/Time: 10/20/2021 3:50 AM  Performed by: attendingPreparation: sterile field established  Location details: face  Wound length:2.6 - 7.5 cm    Anesthesia:  Local Anesthetic: lidocaine 1% with epinephrine  Foreign bodies: no foreign bodies  Irrigation solution: saline  Irrigation method: syringe  Debridement: none  Skin closure: Prolene  Number of sutures: 5  Technique: simple  Approximation: close  Dressing: 4x4  Patient tolerance: patient tolerated the procedure well with no immediate complications  My total time at bedside, performing this procedure was 1-15 minutes.   Comments: (5) Prolene sutures used to close the laceration

## 2022-03-18 PROBLEM — M17.12 OSTEOARTHRITIS OF LEFT KNEE: Status: ACTIVE | Noted: 2019-02-04

## 2022-03-19 PROBLEM — Z96.651 STATUS POST TOTAL RIGHT KNEE REPLACEMENT: Status: ACTIVE | Noted: 2018-01-24

## 2022-03-19 PROBLEM — R03.0 ELEVATED BLOOD PRESSURE READING: Status: ACTIVE | Noted: 2017-06-16

## 2022-03-19 PROBLEM — Z96.652 STATUS POST TOTAL KNEE REPLACEMENT, LEFT: Status: ACTIVE | Noted: 2019-02-04

## 2022-03-19 PROBLEM — R00.1 BRADYCARDIA: Status: ACTIVE | Noted: 2017-06-16

## 2022-06-03 ENCOUNTER — OFFICE VISIT (OUTPATIENT)
Dept: ENT CLINIC | Age: 87
End: 2022-06-03
Payer: COMMERCIAL

## 2022-06-03 DIAGNOSIS — H61.23 BILATERAL IMPACTED CERUMEN: Primary | ICD-10-CM

## 2022-06-03 PROCEDURE — 69210 REMOVE IMPACTED EAR WAX UNI: CPT | Performed by: OTOLARYNGOLOGY

## 2022-06-03 PROCEDURE — 99024 POSTOP FOLLOW-UP VISIT: CPT | Performed by: OTOLARYNGOLOGY

## 2022-06-03 ASSESSMENT — ENCOUNTER SYMPTOMS
ABDOMINAL PAIN: 0
SHORTNESS OF BREATH: 0

## 2022-06-03 NOTE — PROGRESS NOTES
Chief Complaint   Patient presents with    Cerumen Impaction     Patient is here to get her ears cleaned. HPI:  Karolina Lentz is a 82 y.o. female seen in follow-up for her ears- she has narrowed EACs and I had cleaned out her ears just a couple mo ago. They both feel stopped up today- there is no otalgia or otorrhea. Past Medical History, Past Surgical History, Family history, Social History, and Medications were all reviewed with the patient today and updated as necessary. Allergies   Allergen Reactions    Hydrocodone Other (See Comments)     \"too strong\"    Penicillins Itching    Sulfa Antibiotics Itching     Patient Active Problem List   Diagnosis    Abnormal EKG    Osteoarthritis of left knee    Status post total knee replacement, left    Bradycardia    Diaphoresis    Status post total right knee replacement    Elevated blood pressure reading    Malaise and fatigue     Current Outpatient Medications   Medication Sig    amLODIPine (NORVASC) 2.5 MG tablet Take 2.5 mg by mouth daily    Calcium Carbonate-Vitamin D (CALCIUM-VITAMIN D) 600-125 MG-UNIT TABS Take 1 tablet by mouth daily    citalopram (CELEXA) 10 MG tablet Take 10 mg by mouth daily    losartan-hydroCHLOROthiazide (HYZAAR) 100-25 MG per tablet Take 1 tablet by mouth daily    metoprolol succinate (TOPROL XL) 25 MG extended release tablet Take 25 mg by mouth    temazepam (RESTORIL) 15 MG capsule TAKE ONE CAPSULE BY MOUTH AT BEDTIME,as needed     No current facility-administered medications for this visit.      Past Medical History:   Diagnosis Date    Arthritis     osteo    Ear problems     Hypertension     on med for control     Insomnia     Murmur     Murmur (Soft CARA RUSB, Soft TR murmur) heard per cardiology office note dated 05/22/18 (Dr. Aisha Patton)    Psychiatric disorder     anxiety- citalopram daily    Status post total knee replacement, left 2/4/2019    Status post total right knee replacement 1/24/2018 Social History     Tobacco Use    Smoking status: Never Smoker    Smokeless tobacco: Never Used   Substance Use Topics    Alcohol use: Yes     Alcohol/week: 2.0 standard drinks     Past Surgical History:   Procedure Laterality Date    CATARACT REMOVAL Bilateral 2018    COLONOSCOPY      KNEE ARTHROSCOPY Right     TOTAL KNEE ARTHROPLASTY Right 01/24/2018     Family History   Problem Relation Age of Onset    Cancer Mother     Heart Disease Father         ROS:    Review of Systems   Constitutional: Negative for fever. Eyes: Negative for visual disturbance. Respiratory: Negative for shortness of breath. Cardiovascular: Negative for chest pain. Gastrointestinal: Negative for abdominal pain. Skin: Negative for rash. Neurological: Negative for dizziness and headaches. Hematological: Negative for adenopathy. Psychiatric/Behavioral: Negative for agitation. PHYSICAL EXAM:    There were no vitals taken for this visit. General: NAD, well-appearing  Neuro: No gross neuro deficits. No facial weakness. Eyes: No periorbital edema/ecchymosis. No nystagmus. Skin: No facial erythema, rashes or concerning lesions. Nose: No external deviations or saddling. Intranasally, septum is midline without perforations, nasal mucosa appears healthy with no erythema, mucopurulence, or polyps. Mouth: Moist mucus membranes, normal tongue/palate mobility, no concerning mucosal lesions. Oropharynx clear with no erythema/exudate, no tonsillar hypertrophy. Ears: Normal appearing auricles, no hematomas. Narrowed EACs w/ bilateral cerumen impaction, healthy canal skin, TM's intact with no perforations or retraction pockets. No middle ear effusions. Neck: Soft, supple, no palpable neck masses. No palpable parotid or submandibular masses. No thyromegaly or palpable thyroid nodules. Lymphatics: No palpable cervical LAD. Resp: No audible stridor or wheezing. CV: No murmurs, no JVD.   Extremities: No clubbing or cyanosis. PROCEDURE: Cerumen removal under binocular microscopy  INDICATIONS: Cerumen impaction  DESCRIPTION: After verbal consent was obtained and a timeout was performed, the otologic microscope was used to visualize both ears. There were normal appearing auricles bilaterally. There was cerumen impaction bilaterally. I cleaned out both ears under the scope w/ a right angle hook and otologic suctions. After cleaning, the ear canal skin was healthy and both TMs were intact w/ no perforations. Both middle ear spaces were clear w/ no effusions. The patient tolerated the procedure well and there were no complications. ASSESSMENT and PLAN      ICD-10-CM    1. Bilateral impacted cerumen  H61.23 REMOVE IMPACTED EAR WAX       She had bilateral cerumen impaction again and I cleaned out her ears under the scope. RTC in 3-4 mo for another ear cleaning.     Sury Boykin MD  6/5/2022    Electronically signed by Sury Boykin MD on 6/5/2022 at 9:07 AM

## 2022-09-23 ENCOUNTER — OFFICE VISIT (OUTPATIENT)
Dept: ENT CLINIC | Age: 87
End: 2022-09-23
Payer: COMMERCIAL

## 2022-09-23 VITALS
DIASTOLIC BLOOD PRESSURE: 74 MMHG | RESPIRATION RATE: 16 BRPM | SYSTOLIC BLOOD PRESSURE: 128 MMHG | WEIGHT: 128 LBS | BODY MASS INDEX: 21.85 KG/M2 | HEIGHT: 64 IN

## 2022-09-23 DIAGNOSIS — H61.23 BILATERAL IMPACTED CERUMEN: ICD-10-CM

## 2022-09-23 DIAGNOSIS — H90.3 SENSORINEURAL HEARING LOSS (SNHL) OF BOTH EARS: Primary | ICD-10-CM

## 2022-09-23 DIAGNOSIS — H90.3 SENSORINEURAL HEARING LOSS, BILATERAL: Primary | ICD-10-CM

## 2022-09-23 PROCEDURE — 92567 TYMPANOMETRY: CPT | Performed by: AUDIOLOGIST

## 2022-09-23 PROCEDURE — 69210 REMOVE IMPACTED EAR WAX UNI: CPT | Performed by: OTOLARYNGOLOGY

## 2022-09-23 PROCEDURE — 1123F ACP DISCUSS/DSCN MKR DOCD: CPT | Performed by: OTOLARYNGOLOGY

## 2022-09-23 PROCEDURE — 92557 COMPREHENSIVE HEARING TEST: CPT | Performed by: AUDIOLOGIST

## 2022-09-23 ASSESSMENT — ENCOUNTER SYMPTOMS
ABDOMINAL PAIN: 0
SHORTNESS OF BREATH: 0

## 2022-09-23 NOTE — PROGRESS NOTES
replacement, left 2/4/2019    Status post total right knee replacement 1/24/2018     Social History     Tobacco Use    Smoking status: Never    Smokeless tobacco: Never   Substance Use Topics    Alcohol use: Yes     Alcohol/week: 2.0 standard drinks     Past Surgical History:   Procedure Laterality Date    CATARACT REMOVAL Bilateral 2018    COLONOSCOPY      KNEE ARTHROSCOPY Right     TOTAL KNEE ARTHROPLASTY Right 01/24/2018     Family History   Problem Relation Age of Onset    Cancer Mother     Heart Disease Father         ROS:    Review of Systems   Constitutional:  Negative for fever. Eyes:  Negative for visual disturbance. Respiratory:  Negative for shortness of breath. Cardiovascular:  Negative for chest pain. Gastrointestinal:  Negative for abdominal pain. Skin:  Negative for rash. Neurological:  Negative for dizziness and headaches. Hematological:  Negative for adenopathy. Psychiatric/Behavioral:  Negative for agitation. PHYSICAL EXAM:    /74 (Site: Left Upper Arm, Position: Sitting)   Resp 16   Ht 5' 4\" (1.626 m)   Wt 128 lb (58.1 kg)   BMI 21.97 kg/m²     General: NAD, well-appearing  Neuro: No gross neuro deficits. No facial weakness. Eyes: No periorbital edema/ecchymosis. No nystagmus. Skin: No facial erythema, rashes or concerning lesions. Nose: No external deviations or saddling. Intranasally, septum is midline without perforations, nasal mucosa appears healthy with no erythema, mucopurulence, or polyps. Mouth: Moist mucus membranes, normal tongue/palate mobility, no concerning mucosal lesions. Oropharynx clear with no erythema/exudate, no tonsillar hypertrophy. Ears: Normal appearing auricles, no hematomas. EACs have R > L cerumen impaction, healthy canal skin, TM's intact with no perforations or retraction pockets. No middle ear effusions. Neck: Soft, supple, no palpable neck masses. No palpable parotid or submandibular masses.  No thyromegaly or palpable thyroid nodules. Lymphatics: No palpable cervical LAD. Resp: No audible stridor or wheezing. CV: No murmurs, no JVD. Extremities: No clubbing or cyanosis. PROCEDURE: Cerumen removal under binocular microscopy  INDICATIONS: Cerumen impaction  DESCRIPTION: After verbal consent was obtained and a timeout was performed, the otologic microscope was used to visualize both ears. There were normal appearing auricles bilaterally. There was cerumen impaction bilaterally- R > L. I cleaned out both ears under the scope w/ a right angle hook and otologic suctions. After cleaning, the ear canal skin was healthy and both TMs were intact w/ no perforations. Both middle ear spaces were clear w/ no effusions. The patient tolerated the procedure well and there were no complications. ASSESSMENT and PLAN      ICD-10-CM    1. Sensorineural hearing loss (SNHL) of both ears  H90.3       2. Bilateral impacted cerumen  H61.23 REMOVAL OF IMPACTED WAX MD          She had R > L cerumen impaction and I cleaned out her ear under the scope today. I also obtained an audiogram which revealed mild sloping to severe SNHL in both ears. She is a great candidate for hearing aids and will F/U for HAE. RTC in 4-5 mo for another ear cleaning.      Reagan Tavera MD  9/24/2022    Electronically signed by Reagan Tavera MD on 9/24/2022 at 7:44 AM

## 2022-09-23 NOTE — PROGRESS NOTES
AUDIOLOGY EVALUATION    Kenia Ramirez was seen today for an audiologic evaluation. The patient reported hearing concerns bilaterally. Results as follows:    Tympanometry:  Type A -  bilaterally    Audiometry:  Test Performed - Comprehensive Audiogram    Degree & Type of Loss - Right Ear: mild sloping to severe sensorineural hearing loss                          Left Ear: mild sloping to severe sensorineural hearing loss    SRT   Measurement Right Ear Left Ear   Value 30 30   Unit dB dB     Discrimination  Measurement Right Ear Left Ear   Value 92% 92%   Unit dB dB     Impressions:  Bilateral SNHL. Word recognition scores are excellent bilaterally. Communication difficulties are anticipated with background noise and reduced visual cues. Recommendations:  HAE/binaural amplification. Re-evaluation with changes in hearing/otologic concerns.     Rosales Shi  Audiologist

## 2023-01-30 ENCOUNTER — OFFICE VISIT (OUTPATIENT)
Dept: ENT CLINIC | Age: 88
End: 2023-01-30
Payer: COMMERCIAL

## 2023-01-30 VITALS — WEIGHT: 117 LBS | BODY MASS INDEX: 19.97 KG/M2 | HEIGHT: 64 IN

## 2023-01-30 DIAGNOSIS — H61.23 BILATERAL IMPACTED CERUMEN: Primary | ICD-10-CM

## 2023-01-30 PROCEDURE — 69210 REMOVE IMPACTED EAR WAX UNI: CPT | Performed by: OTOLARYNGOLOGY

## 2023-01-30 ASSESSMENT — ENCOUNTER SYMPTOMS
ABDOMINAL PAIN: 0
SHORTNESS OF BREATH: 0

## 2023-01-30 NOTE — PROGRESS NOTES
Chief Complaint   Patient presents with    Cerumen Impaction     Patient is here for her follow up on ear cleaning       HPI:  Lisa Birmingham is a 82 y.o. female seen in follow-up for her ears. She has a history of cerumen impaction and very narrowed EACs bilaterally. Her last ear cleaning was back in September. Doing well since then but her right ear feels very plugged this morning. There is no otalgia or otorrhea. Past Medical History, Past Surgical History, Family history, Social History, and Medications were all reviewed with the patient today and updated as necessary. Allergies   Allergen Reactions    Hydrocodone Other (See Comments)     \"too strong\"    Penicillins Itching    Sulfa Antibiotics Itching     Patient Active Problem List   Diagnosis    Abnormal EKG    Osteoarthritis of left knee    Status post total knee replacement, left    Bradycardia    Diaphoresis    Status post total right knee replacement    Elevated blood pressure reading    Malaise and fatigue     Current Outpatient Medications   Medication Sig    amLODIPine (NORVASC) 2.5 MG tablet Take 2.5 mg by mouth daily    Calcium Carbonate-Vitamin D (CALCIUM-VITAMIN D) 600-125 MG-UNIT TABS Take 1 tablet by mouth daily    citalopram (CELEXA) 10 MG tablet Take 10 mg by mouth daily    losartan-hydroCHLOROthiazide (HYZAAR) 100-25 MG per tablet Take 1 tablet by mouth daily    metoprolol succinate (TOPROL XL) 25 MG extended release tablet Take 25 mg by mouth    temazepam (RESTORIL) 15 MG capsule TAKE ONE CAPSULE BY MOUTH AT BEDTIME,as needed     No current facility-administered medications for this visit.      Past Medical History:   Diagnosis Date    Arthritis     osteo    Ear problems     Hypertension     on med for control     Insomnia     Murmur     Murmur (Soft CARA RUSB, Soft TR murmur) heard per cardiology office note dated 05/22/18 (Dr. Aristides Mcfaralne)    Psychiatric disorder     anxiety- citalopram daily    Status post total knee replacement, left 2/4/2019    Status post total right knee replacement 1/24/2018     Social History     Tobacco Use    Smoking status: Never    Smokeless tobacco: Never   Substance Use Topics    Alcohol use: Yes     Alcohol/week: 2.0 standard drinks     Past Surgical History:   Procedure Laterality Date    CATARACT REMOVAL Bilateral 2018    COLONOSCOPY      KNEE ARTHROSCOPY Right     TOTAL KNEE ARTHROPLASTY Right 01/24/2018     Family History   Problem Relation Age of Onset    Cancer Mother     Heart Disease Father         ROS:    Review of Systems   Constitutional:  Negative for fever. Eyes:  Negative for visual disturbance. Respiratory:  Negative for shortness of breath. Cardiovascular:  Negative for chest pain. Gastrointestinal:  Negative for abdominal pain. Skin:  Negative for rash. Neurological:  Negative for dizziness and headaches. Hematological:  Negative for adenopathy. Psychiatric/Behavioral:  Negative for agitation. PHYSICAL EXAM:    Ht 5' 4\" (1.626 m)   Wt 117 lb (53.1 kg)   BMI 20.08 kg/m²     General: NAD, well-appearing  Neuro: No gross neuro deficits. No facial weakness. Eyes: No periorbital edema/ecchymosis. No nystagmus. Skin: No facial erythema, rashes or concerning lesions. Nose: No external deviations or saddling. Intranasally, septum is midline without perforations, nasal mucosa appears healthy with no erythema, mucopurulence, or polyps. Mouth: Moist mucus membranes, normal tongue/palate mobility, no concerning mucosal lesions. Oropharynx clear with no erythema/exudate, no tonsillar hypertrophy. Ears: Normal appearing auricles, no hematomas. Narrowed EACs clear w/ bilateral cerumen impaction (R > L),  healthy canal skin, TM's intact with no perforations or retraction pockets. No middle ear effusions. Neck: Soft, supple, no palpable neck masses. No palpable parotid or submandibular masses. No thyromegaly or palpable thyroid nodules.    Lymphatics: No palpable cervical LAD.  Resp: No audible stridor or wheezing. CV: No murmurs, no JVD. Extremities: No clubbing or cyanosis. PROCEDURE: Cerumen removal under binocular microscopy  INDICATIONS: Cerumen impaction  DESCRIPTION: After verbal consent was obtained and a timeout was performed, the otologic microscope was used to visualize both ears. There were normal appearing auricles bilaterally. There was cerumen impaction bilaterally- R > L. I cleaned out both ears under the scope w/ a right angle hook and otologic suctions. After cleaning, the ear canal skin was healthy and both TMs were intact w/ no perforations. Both middle ear spaces were clear w/ no effusions. The patient tolerated the procedure well and there were no complications. ASSESSMENT and PLAN      ICD-10-CM    1. Bilateral impacted cerumen  H61.23 84263 - [place] REMOVE IMPACTED EAR WAX        I cleaned out her ears today under microscopy will see her back in 3 months for another ear cleaning.     Carlyn Godinez MD  1/30/2023    Electronically signed by Carlyn Godinez MD on 1/30/2023 at 11:15 AM

## 2023-05-01 ENCOUNTER — OFFICE VISIT (OUTPATIENT)
Dept: ENT CLINIC | Age: 88
End: 2023-05-01
Payer: COMMERCIAL

## 2023-05-01 VITALS
HEIGHT: 64 IN | BODY MASS INDEX: 21.51 KG/M2 | SYSTOLIC BLOOD PRESSURE: 130 MMHG | WEIGHT: 126 LBS | OXYGEN SATURATION: 97 % | DIASTOLIC BLOOD PRESSURE: 70 MMHG

## 2023-05-01 DIAGNOSIS — H61.21 RIGHT EAR IMPACTED CERUMEN: Primary | ICD-10-CM

## 2023-05-01 PROCEDURE — 69210 REMOVE IMPACTED EAR WAX UNI: CPT | Performed by: OTOLARYNGOLOGY

## 2023-05-01 ASSESSMENT — ENCOUNTER SYMPTOMS
EYE ITCHING: 0
SHORTNESS OF BREATH: 0
VOMITING: 0
EYE REDNESS: 0

## 2023-05-01 NOTE — PROGRESS NOTES
effusions. Neck: Soft, supple, no palpable neck masses. No palpable parotid or submandibular masses. No thyromegaly or palpable thyroid nodules. Lymphatics: No palpable cervical LAD. Resp: No audible stridor or wheezing. CV: No murmurs, no JVD. Extremities: No clubbing or cyanosis. PROCEDURE: Cerumen removal under binocular microscopy  INDICATIONS: Cerumen impaction  DESCRIPTION: After verbal consent was obtained and a timeout was performed, the otologic microscope was used to visualize both ears. There were normal appearing auricles bilaterally. There was cerumen impaction on R side only. I cleaned out both ears under the scope w/ a right angle hook and otologic suctions. After cleaning, the ear canal skin was healthy and both TMs were intact w/ no perforations. Both middle ear spaces were clear w/ no effusions. The patient tolerated the procedure well and there were no complications. ASSESSMENT and PLAN      ICD-10-CM    1. Right ear impacted cerumen  H61.21 71499 - [place] REMOVE IMPACTED EAR WAX        She had right-sided cerumen impaction and I cleaned out her ears today under the microscope. RTC in 4 months for another ear cleaning.     Wilberto Lackey MD  5/1/2023    Electronically signed by Wilberto Lackey MD on 5/1/2023 at 12:01 PM

## 2023-06-16 PROBLEM — I48.0 PAROXYSMAL ATRIAL FIBRILLATION (HCC): Status: ACTIVE | Noted: 2023-06-16

## 2023-06-16 PROBLEM — I10 PRIMARY HYPERTENSION: Status: ACTIVE | Noted: 2023-06-16

## 2023-06-17 NOTE — PROGRESS NOTES
Resting comfortably,dsng D/I. NV status WDL. Denies needs at present. SCDs on bilaterally. Iceman cooling system in use. Call light within reach. Patent

## 2023-06-30 ENCOUNTER — TELEPHONE (OUTPATIENT)
Age: 88
End: 2023-06-30

## 2023-07-03 NOTE — TELEPHONE ENCOUNTER
Called patient and left voice mail to return call to Pascual Waters. Samples of Eliquis 2.5 mg bid left at the  of the [facility] by [facility].

## 2023-07-05 ENCOUNTER — TELEPHONE (OUTPATIENT)
Age: 88
End: 2023-07-05

## 2023-07-05 NOTE — TELEPHONE ENCOUNTER
Per Dr. Ruslan Lamb, A-fib throughout. Intermittently bradycardic during early a.m. hours (probably sleep) with 3.1-second pause at 5:30 in the morning but otherwise no significant pauses. No significant prolonged rapid A-fib. Continue meds and keep routine follow-up   Called patient who voiced understanding of Dr. Ruslan Lamb' response.

## 2023-07-09 PROBLEM — I63.412 CEREBROVASCULAR ACCIDENT (CVA) DUE TO EMBOLISM OF LEFT MIDDLE CEREBRAL ARTERY (HCC): Status: ACTIVE | Noted: 2023-07-09

## 2023-07-10 ASSESSMENT — ENCOUNTER SYMPTOMS
DIARRHEA: 0
NAUSEA: 0
WHEEZING: 0
ABDOMINAL PAIN: 0
SHORTNESS OF BREATH: 0
ABDOMINAL DISTENTION: 0
COUGH: 0
VOMITING: 0
PHOTOPHOBIA: 0
CONSTIPATION: 0

## 2023-07-10 NOTE — PROGRESS NOTES
[facility] CARDIOLOGY  [id number] [place] [address] [facility]  PHONE: [phone]        NAME:  Ilda Huston  : 1935  MRN: [id number]     PCP:  DEYVI Collier NP    SUBJECTIVE:   Ilda Huston is a 82 y.o. 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 Interventional radiology assisted with mechanical thrombectomy, recanalization and reperfusion. ECG :  Atrial fibrillation   ? atrial activity   Left anterior fascicular block   axis(240,-40), init forces inf   Probable left ventricular hypertrophy   (RaVL+SV3)xQRSd >300   Borderline prolonged QT interval   QTc >485mS     Tesha echo :    Normal left ventricle cavity size. The left ventricular systolic function is normal (55-65%). There is mild concentric left ventricular hypertrophy present. Unable to assess left ventricular diastolic function. Elevated left atrial pressure. The right ventricular systolic function is normal.     The left atrium is severely dilated. Mild mitral valve regurgitation. Mild tricuspid valve regurgitation. She was completely asymptomatic prior to the onset of her stroke symptoms with no angina, CHF, palpitations, presyncope or syncope. She has no prior history of A-fib and was unaware that she was in A-fib at the time of her CVA. Duration of the arrhythmia is unknown but she remains in A-fib today A-fib throughout the Holter monitor recently. She is compliant with beta-blocker therapy with good heart rate and blood pressure control and compliant with Eliquis with no missed doses and no bleeding issues.   Heart rate and blood pressure

## 2023-07-14 ENCOUNTER — OFFICE VISIT (OUTPATIENT)
Age: 88
End: 2023-07-14
Payer: COMMERCIAL

## 2023-07-14 VITALS
SYSTOLIC BLOOD PRESSURE: 142 MMHG | HEIGHT: 64 IN | DIASTOLIC BLOOD PRESSURE: 78 MMHG | BODY MASS INDEX: 20.4 KG/M2 | WEIGHT: 119.5 LBS | HEART RATE: 59 BPM

## 2023-07-14 DIAGNOSIS — R53.82 CHRONIC FATIGUE: ICD-10-CM

## 2023-07-14 DIAGNOSIS — R06.83 SNORING: ICD-10-CM

## 2023-07-14 DIAGNOSIS — I48.0 PAROXYSMAL ATRIAL FIBRILLATION (HCC): Primary | ICD-10-CM

## 2023-07-14 DIAGNOSIS — I10 PRIMARY HYPERTENSION: ICD-10-CM

## 2023-07-14 DIAGNOSIS — I63.412 CEREBROVASCULAR ACCIDENT (CVA) DUE TO EMBOLISM OF LEFT MIDDLE CEREBRAL ARTERY (HCC): ICD-10-CM

## 2023-07-14 DIAGNOSIS — R94.31 ABNORMAL EKG: ICD-10-CM

## 2023-07-14 PROCEDURE — 93000 ELECTROCARDIOGRAM COMPLETE: CPT | Performed by: INTERNAL MEDICINE

## 2023-07-14 PROCEDURE — 1123F ACP DISCUSS/DSCN MKR DOCD: CPT | Performed by: INTERNAL MEDICINE

## 2023-07-14 PROCEDURE — 99214 OFFICE O/P EST MOD 30 MIN: CPT | Performed by: INTERNAL MEDICINE

## 2023-07-14 RX ORDER — LANOLIN ALCOHOL/MO/W.PET/CERES
3 CREAM (GRAM) TOPICAL DAILY
COMMUNITY

## 2023-07-14 RX ORDER — TEMAZEPAM 15 MG/1
CAPSULE ORAL
COMMUNITY
Start: 2023-06-15

## 2023-07-17 ENCOUNTER — TELEPHONE (OUTPATIENT)
Age: 88
End: 2023-07-17

## 2023-07-17 DIAGNOSIS — R53.83 MALAISE AND FATIGUE: ICD-10-CM

## 2023-07-17 DIAGNOSIS — R53.81 MALAISE AND FATIGUE: ICD-10-CM

## 2023-07-17 DIAGNOSIS — R53.82 CHRONIC FATIGUE: ICD-10-CM

## 2023-07-17 DIAGNOSIS — R06.83 SNORING: Primary | ICD-10-CM

## 2023-07-17 NOTE — TELEPHONE ENCOUNTER
Per Rakesh Reynolds a home sleep study.     Orders Placed This Encounter   Procedures    [facility]     Referral Priority:   Routine     Referral Type:   Eval and Treat     Referral Reason:   Specialty Services Required     Number of Visits Requested:   1

## 2023-07-25 ENCOUNTER — TELEPHONE (OUTPATIENT)
Age: 88
End: 2023-07-25

## 2023-07-25 NOTE — TELEPHONE ENCOUNTER
Pt is requesting samples of her eliquis 2.5 mg   Pt would also like to know if there are any samples for atorvastatin 80 mg  Pt would also like to know if it safe for her to get a mammogram

## 2023-07-26 NOTE — TELEPHONE ENCOUNTER
Samples placed up front at [facility].  Called pt and informed her that samples of Eliquis are up front at [facility] and that Dr. Nathan Hyatt stated that it was okay to have a mammogram

## 2023-08-03 ENCOUNTER — HOSPITAL ENCOUNTER (OUTPATIENT)
Dept: SLEEP CENTER | Age: 88
Discharge: HOME | End: 2023-08-06
Payer: MEDICARE

## 2023-08-03 PROCEDURE — 95806 SLEEP STUDY UNATT&RESP EFFT: CPT

## 2023-08-21 ENCOUNTER — OFFICE VISIT (OUTPATIENT)
Dept: ENT CLINIC | Age: 88
End: 2023-08-21
Payer: MEDICARE

## 2023-08-21 VITALS — WEIGHT: 127 LBS | BODY MASS INDEX: 21.68 KG/M2 | RESPIRATION RATE: 16 BRPM | HEIGHT: 64 IN

## 2023-08-21 DIAGNOSIS — H61.23 BILATERAL IMPACTED CERUMEN: Primary | ICD-10-CM

## 2023-08-21 PROCEDURE — 69210 REMOVE IMPACTED EAR WAX UNI: CPT | Performed by: OTOLARYNGOLOGY

## 2023-08-21 RX ORDER — HYDROCHLOROTHIAZIDE 25 MG/1
25 TABLET ORAL
COMMUNITY
Start: 2015-07-13

## 2023-08-21 RX ORDER — DULOXETIN HYDROCHLORIDE 30 MG/1
CAPSULE, DELAYED RELEASE ORAL
COMMUNITY
Start: 2023-05-30

## 2023-08-21 ASSESSMENT — ENCOUNTER SYMPTOMS
ABDOMINAL PAIN: 0
SHORTNESS OF BREATH: 0

## 2023-08-21 NOTE — PROGRESS NOTES
1200 MG CAPS Take by mouth Twice a Week    Multiple Vitamins-Minerals (THERAPEUTIC MULTIVITAMIN-MINERALS) tablet Take 1 tablet by mouth daily    vitamin D3 (CHOLECALCIFEROL) 25 MCG (1000 UT) TABS tablet Take 1 tablet by mouth daily    calcium carbonate 600 MG TABS tablet Take 2 tablets by mouth daily    amLODIPine (NORVASC) 2.5 MG tablet Take 1 tablet by mouth daily    losartan-hydroCHLOROthiazide (HYZAAR) 100-25 MG per tablet Take 1 tablet by mouth daily    metoprolol succinate (TOPROL XL) 25 MG extended release tablet Take 1 tablet by mouth     No current facility-administered medications for this visit. Past Medical History:   Diagnosis Date    Arthritis     osteo    Ear problems     Hypertension     on med for control     Insomnia     Murmur     Murmur (Soft CARA RUSB, Soft TR murmur) heard per cardiology office note dated 05/22/18 (Dr. Hubert Quintanilla)    Psychiatric disorder     anxiety- citalopram daily    Status post total knee replacement, left 2/4/2019    Status post total right knee replacement 1/24/2018     Social History     Tobacco Use    Smoking status: Never     Passive exposure: Never    Smokeless tobacco: Never   Substance Use Topics    Alcohol use: Yes     Alcohol/week: 2.0 standard drinks     Past Surgical History:   Procedure Laterality Date    CATARACT REMOVAL Bilateral 2018    COLONOSCOPY      KNEE ARTHROSCOPY Right     TOTAL KNEE ARTHROPLASTY Right 01/24/2018     Family History   Problem Relation Age of Onset    Cancer Mother     Heart Disease Father         ROS:    Review of Systems   Constitutional:  Negative for fever. HENT:  Negative for ear pain. Eyes:  Negative for visual disturbance. Respiratory:  Negative for shortness of breath. Cardiovascular:  Negative for chest pain. Gastrointestinal:  Negative for abdominal pain. Skin:  Negative for rash. Neurological:  Negative for dizziness and headaches. Hematological:  Negative for adenopathy.    Psychiatric/Behavioral:  Negative

## 2023-09-05 ENCOUNTER — TELEPHONE (OUTPATIENT)
Age: 88
End: 2023-09-05

## 2023-09-07 ENCOUNTER — TELEPHONE (OUTPATIENT)
Age: 88
End: 2023-09-07

## 2023-09-07 NOTE — TELEPHONE ENCOUNTER
Please call patient and let her know if we have samples. Said they called on Tuesday and have not heard from us.  Please call

## 2023-09-07 NOTE — TELEPHONE ENCOUNTER
She had a large stroke and this is a neurologic issue. I cannot give her neurologic clearance to drive. From a cardiac standpoint she can drive now. They need to call neurology as well at [facility] and discuss with them.

## 2023-09-08 ENCOUNTER — TELEPHONE (OUTPATIENT)
Age: 88
End: 2023-09-08

## 2023-09-08 NOTE — TELEPHONE ENCOUNTER
Pts family is asking for the Eliquis 2.5 to be sent to the [address] [place] [address]. I will call and give the verbal order if that is acceptable to you. Sathya Bette

## 2023-09-12 NOTE — TELEPHONE ENCOUNTER
This is a neurologic clearance. She needs to talk with [facility] neurology to get clearance to drive. From my standpoint she can drive but she needs to discuss with neurology at [facility].

## 2023-09-17 ENCOUNTER — HOSPITAL ENCOUNTER (OUTPATIENT)
Dept: SLEEP MEDICINE | Age: 88
Discharge: HOME | End: 2023-09-20
Payer: MEDICARE

## 2023-09-17 PROCEDURE — 95811 POLYSOM 6/>YRS CPAP 4/> PARM: CPT

## 2023-09-22 ENCOUNTER — TELEPHONE (OUTPATIENT)
Dept: SLEEP MEDICINE | Age: 88
End: 2023-09-22

## 2023-09-22 NOTE — TELEPHONE ENCOUNTER
Patient needs New Pt PSG appt/ AHI - 65.4      Lowest SaO2-77%- patient needs oxygen and will need to be seen by 10/15/23.           Forward to schedulers

## 2023-10-09 ENCOUNTER — TELEPHONE (OUTPATIENT)
Age: 88
End: 2023-10-09

## 2023-10-11 NOTE — PROGRESS NOTES
[address] [place] [address]  [phone]    Patient Name:  Ramya Guidry  YOB: 1935    Telehealth encounter is a billable service, with coverage as determined by the insurance carrier. The patient was located at Home: [address] [place] [phone]  The provider was located at Facility [facility] CT Dept): [address] [place] [address] [address] [facility]  [phone]    Services were provided through a video synchronous discussion virtually to substitute for in-person clinic visit. Ramya Guidry is a 82 y.o. female who was seen by synchronous (real-time) audio-video technology on 10/13/2023. Consent:  She and/or her healthcare decision maker is aware that this patient-initiated Telehealth encounter is a billable service, with coverage as determined by her insurance carrier. She is aware that she may receive a bill and has provided verbal consent to proceed: Yes      Office Visit 10/13/2023    CHIEF COMPLAINT:    Chief Complaint   Patient presents with    Sleep Problem     To review sleep study. HISTORY OF PRESENT ILLNESS: The patient presents in outpatient consultation at the request of Dr. Debi Isidro for management of obstructive sleep apnea. PMH Afib, HTN, CVA, osteoarthritis, knee replacement, etc. this is a virtual visit. Patient is caregiver is present and patient's daughter joined us via telephone. Patient reports having Afib causing a stroke in June. Her cardiologist referred patient for a sleep study to evaluate for calls of a fib. She does snore and her family has noticed witnessed apneas for many seconds. She will then gasp and start breathing again. Patient goes to sleep around 9 PM and wakes up at 7 AM. She feels rested and is very active during the day. She denies feeling daytime sleepiness. She does nap since her stroke. She denies any caffeine or tobacco use.  She

## 2023-10-13 ENCOUNTER — TELEMEDICINE (OUTPATIENT)
Dept: SLEEP MEDICINE | Age: 88
End: 2023-10-13
Payer: MEDICARE

## 2023-10-13 DIAGNOSIS — G47.30 SEVERE SLEEP APNEA: Primary | ICD-10-CM

## 2023-10-13 DIAGNOSIS — G47.34 NOCTURNAL HYPOXEMIA: ICD-10-CM

## 2023-10-13 DIAGNOSIS — I63.412 CEREBROVASCULAR ACCIDENT (CVA) DUE TO EMBOLISM OF LEFT MIDDLE CEREBRAL ARTERY (HCC): ICD-10-CM

## 2023-10-13 DIAGNOSIS — I48.91 ATRIAL FIBRILLATION, UNSPECIFIED TYPE (HCC): ICD-10-CM

## 2023-10-13 PROCEDURE — 1123F ACP DISCUSS/DSCN MKR DOCD: CPT | Performed by: NURSE PRACTITIONER

## 2023-10-13 PROCEDURE — 99203 OFFICE O/P NEW LOW 30 MIN: CPT | Performed by: NURSE PRACTITIONER

## 2023-10-13 PROCEDURE — G8427 DOCREV CUR MEDS BY ELIG CLIN: HCPCS | Performed by: NURSE PRACTITIONER

## 2023-10-13 PROCEDURE — 1090F PRES/ABSN URINE INCON ASSESS: CPT | Performed by: NURSE PRACTITIONER

## 2023-10-13 ASSESSMENT — SLEEP AND FATIGUE QUESTIONNAIRES
HOW LIKELY ARE YOU TO NOD OFF OR FALL ASLEEP IN A CAR, WHILE STOPPED FOR A FEW MINUTES IN TRAFFIC: 0
HOW LIKELY ARE YOU TO NOD OFF OR FALL ASLEEP WHILE LYING DOWN TO REST IN THE AFTERNOON WHEN CIRCUMSTANCES PERMIT: 3
HOW LIKELY ARE YOU TO NOD OFF OR FALL ASLEEP WHILE WATCHING TV: 3
ESS TOTAL SCORE: 8
HOW LIKELY ARE YOU TO NOD OFF OR FALL ASLEEP WHILE SITTING AND TALKING TO SOMEONE: 0
HOW LIKELY ARE YOU TO NOD OFF OR FALL ASLEEP WHEN YOU ARE A PASSENGER IN A CAR FOR AN HOUR WITHOUT A BREAK: 0
HOW LIKELY ARE YOU TO NOD OFF OR FALL ASLEEP WHILE SITTING AND READING: 0
HOW LIKELY ARE YOU TO NOD OFF OR FALL ASLEEP WHILE SITTING INACTIVE IN A PUBLIC PLACE: 0
HOW LIKELY ARE YOU TO NOD OFF OR FALL ASLEEP WHILE SITTING QUIETLY AFTER LUNCH WITHOUT ALCOHOL: 2

## 2023-10-13 NOTE — PATIENT INSTRUCTIONS
The company who will be taking care of your CPAP and oxygen supplies is:     Address: [address] [place] [address] [address], [facility], [facility] [address]  Phone: [phone] Option #1  Fax: [phone]

## 2023-10-15 ASSESSMENT — ENCOUNTER SYMPTOMS
ABDOMINAL DISTENTION: 0
COUGH: 0
SHORTNESS OF BREATH: 0
WHEEZING: 0
ABDOMINAL PAIN: 0
DIARRHEA: 0
VOMITING: 0
PHOTOPHOBIA: 0
NAUSEA: 0
CONSTIPATION: 0

## 2023-10-15 NOTE — PROGRESS NOTES
frequency and hematuria. Musculoskeletal:  Negative for arthralgias, joint swelling and myalgias. Skin:  Negative for rash and wound. Allergic/Immunologic: Negative for environmental allergies. Neurological:  Negative for dizziness, seizures, syncope and light-headedness. Improving speech difficulty   Hematological:  Negative for adenopathy. Does not bruise/bleed easily. Psychiatric/Behavioral:  Negative for agitation, behavioral problems, confusion and hallucinations. PHYSICAL EXAM:  Vitals:    10/17/23 1018 10/17/23 1028 10/17/23 1035   BP: (!) 142/86 (!) 140/80 134/82   Pulse: 59     SpO2: 100%     Weight: 119 lb (54 kg)     Height: 5' 4\" (1.626 m)        Wt Readings from Last 3 Encounters:   10/17/23 119 lb (54 kg)   08/21/23 127 lb (57.6 kg)   07/14/23 119 lb 8 oz (54.2 kg)      BP Readings from Last 3 Encounters:   10/17/23 134/82   07/14/23 (!) 142/78   06/16/23 126/82        Physical Exam  Constitutional:       Appearance: Normal appearance. HENT:      Head: Normocephalic and atraumatic. Nose: Nose normal.      Mouth/Throat:      Mouth: Mucous membranes are moist.      Pharynx: Oropharynx is clear. Eyes:      Extraocular Movements: Extraocular movements intact. Pupils: Pupils are equal, round, and reactive to light. Neck:      Vascular: No carotid bruit or JVD. Cardiovascular:      Rate and Rhythm: Normal rate. Rhythm irregular. Heart sounds: No murmur heard. No friction rub. No gallop. Pulmonary:      Effort: Pulmonary effort is normal.      Breath sounds: Normal breath sounds. No wheezing or rhonchi. Abdominal:      General: Abdomen is flat. Bowel sounds are normal. There is no distension. Palpations: Abdomen is soft. Tenderness: There is no abdominal tenderness. Musculoskeletal:         General: Normal range of motion. Cervical back: Normal range of motion and neck supple. No tenderness. Skin:     General: Skin is warm and dry.

## 2023-10-17 ENCOUNTER — OFFICE VISIT (OUTPATIENT)
Age: 88
End: 2023-10-17
Payer: MEDICARE

## 2023-10-17 VITALS
OXYGEN SATURATION: 100 % | DIASTOLIC BLOOD PRESSURE: 82 MMHG | SYSTOLIC BLOOD PRESSURE: 134 MMHG | HEIGHT: 64 IN | BODY MASS INDEX: 20.32 KG/M2 | WEIGHT: 119 LBS | HEART RATE: 59 BPM

## 2023-10-17 DIAGNOSIS — I48.0 PAROXYSMAL ATRIAL FIBRILLATION (HCC): Primary | ICD-10-CM

## 2023-10-17 DIAGNOSIS — R00.1 BRADYCARDIA: ICD-10-CM

## 2023-10-17 DIAGNOSIS — R94.31 ABNORMAL EKG: ICD-10-CM

## 2023-10-17 DIAGNOSIS — I63.412 CEREBROVASCULAR ACCIDENT (CVA) DUE TO EMBOLISM OF LEFT MIDDLE CEREBRAL ARTERY (HCC): ICD-10-CM

## 2023-10-17 DIAGNOSIS — R03.0 ELEVATED BLOOD PRESSURE READING: ICD-10-CM

## 2023-10-17 DIAGNOSIS — I10 PRIMARY HYPERTENSION: ICD-10-CM

## 2023-10-17 PROCEDURE — G8484 FLU IMMUNIZE NO ADMIN: HCPCS | Performed by: INTERNAL MEDICINE

## 2023-10-17 PROCEDURE — 99214 OFFICE O/P EST MOD 30 MIN: CPT | Performed by: INTERNAL MEDICINE

## 2023-10-17 PROCEDURE — 1090F PRES/ABSN URINE INCON ASSESS: CPT | Performed by: INTERNAL MEDICINE

## 2023-10-17 PROCEDURE — 1036F TOBACCO NON-USER: CPT | Performed by: INTERNAL MEDICINE

## 2023-10-17 PROCEDURE — G8420 CALC BMI NORM PARAMETERS: HCPCS | Performed by: INTERNAL MEDICINE

## 2023-10-17 PROCEDURE — 1123F ACP DISCUSS/DSCN MKR DOCD: CPT | Performed by: INTERNAL MEDICINE

## 2023-10-17 PROCEDURE — 93000 ELECTROCARDIOGRAM COMPLETE: CPT | Performed by: INTERNAL MEDICINE

## 2023-10-17 PROCEDURE — G8427 DOCREV CUR MEDS BY ELIG CLIN: HCPCS | Performed by: INTERNAL MEDICINE

## 2023-10-17 RX ORDER — LOSARTAN POTASSIUM AND HYDROCHLOROTHIAZIDE 25; 100 MG/1; MG/1
1 TABLET ORAL DAILY
Qty: 90 TABLET | Refills: 3 | Status: SHIPPED | OUTPATIENT
Start: 2023-10-17

## 2023-10-17 RX ORDER — METOPROLOL SUCCINATE 25 MG/1
25 TABLET, EXTENDED RELEASE ORAL DAILY
Qty: 90 TABLET | Refills: 3 | Status: SHIPPED | OUTPATIENT
Start: 2023-10-17 | End: 2023-10-17

## 2023-10-17 RX ORDER — APIXABAN 2.5 MG/1
2.5 TABLET, FILM COATED ORAL 2 TIMES DAILY
Qty: 180 TABLET | Refills: 3 | Status: SHIPPED | OUTPATIENT
Start: 2023-10-17

## 2023-10-17 RX ORDER — METOPROLOL SUCCINATE 25 MG/1
12.5 TABLET, EXTENDED RELEASE ORAL DAILY
Qty: 90 TABLET | Refills: 3
Start: 2023-10-17

## 2023-10-17 RX ORDER — AMLODIPINE BESYLATE 2.5 MG/1
2.5 TABLET ORAL DAILY
Qty: 90 TABLET | Refills: 3 | Status: SHIPPED | OUTPATIENT
Start: 2023-10-17

## 2023-10-17 RX ORDER — ATORVASTATIN CALCIUM 80 MG/1
TABLET, FILM COATED ORAL
Qty: 90 TABLET | Refills: 3 | Status: SHIPPED | OUTPATIENT
Start: 2023-10-17

## 2023-11-06 ENCOUNTER — TELEPHONE (OUTPATIENT)
Age: 88
End: 2023-11-06

## 2023-11-27 ENCOUNTER — OFFICE VISIT (OUTPATIENT)
Dept: ENT CLINIC | Age: 88
End: 2023-11-27
Payer: MEDICARE

## 2023-11-27 VITALS — WEIGHT: 120 LBS | HEIGHT: 64 IN | BODY MASS INDEX: 20.49 KG/M2 | RESPIRATION RATE: 17 BRPM

## 2023-11-27 DIAGNOSIS — H61.21 IMPACTED CERUMEN, RIGHT EAR: Primary | ICD-10-CM

## 2023-11-27 PROCEDURE — 69210 REMOVE IMPACTED EAR WAX UNI: CPT | Performed by: OTOLARYNGOLOGY

## 2023-11-27 ASSESSMENT — ENCOUNTER SYMPTOMS
SHORTNESS OF BREATH: 0
ABDOMINAL PAIN: 0
SORE THROAT: 0

## 2023-11-27 NOTE — PROGRESS NOTES
No chief complaint on file. HPI:  Yenny Gale is a 82 y.o. female seen in follow-up for her ears. She has a history of cerumen impaction and usually requires ear cleaning several times a year. Last seen back on 8/21/2023. Her ears feel stopped up today, especially the right side. She is also having trouble putting her hearing aids in daily. There is not been any otalgia or otorrhea. Past Medical History, Past Surgical History, Family history, Social History, and Medications were all reviewed with the patient today and updated as necessary.      Allergies   Allergen Reactions    Hydrocodone Other (See Comments)     \"too strong\"    Penicillins Itching    Sulfa Antibiotics Itching     Patient Active Problem List   Diagnosis    Abnormal EKG    Osteoarthritis of left knee    Status post total knee replacement, left    Bradycardia    Diaphoresis    Status post total right knee replacement    Elevated blood pressure reading    Malaise and fatigue    Paroxysmal atrial fibrillation (HCC)    Primary hypertension    Cerebrovascular accident (CVA) due to embolism of left middle cerebral artery (HCC)     Current Outpatient Medications   Medication Sig    ELIQUIS 2.5 MG TABS tablet Take 1 tablet by mouth 2 times daily    atorvastatin (LIPITOR) 80 MG tablet TAKE 1 TABLET (80 MG) BY MOUTH NIGHTLY    amLODIPine (NORVASC) 2.5 MG tablet Take 1 tablet by mouth daily    losartan-hydroCHLOROthiazide (HYZAAR) 100-25 MG per tablet Take 1 tablet by mouth daily    metoprolol succinate (TOPROL XL) 25 MG extended release tablet Take 0.5 tablets by mouth daily    temazepam (RESTORIL) 15 MG capsule 1 capsule at bedtime as needed Orally prn for 30 days    melatonin 3 MG TABS tablet Take 1 tablet by mouth daily    Misc Natural Products (CRANBERRY/PROBIOTIC PO) Take by mouth    DULoxetine (CYMBALTA) 60 MG extended release capsule Take 1 capsule by mouth daily    Omega-3 Fatty Acids (FISH OIL) 1200 MG CAPS Take by mouth Twice a

## 2023-11-29 ENCOUNTER — TELEPHONE (OUTPATIENT)
Age: 88
End: 2023-11-29

## 2024-01-04 ENCOUNTER — TELEPHONE (OUTPATIENT)
Age: 89
End: 2024-01-04

## 2024-01-22 ENCOUNTER — TELEPHONE (OUTPATIENT)
Age: 89
End: 2024-01-22

## 2024-01-22 NOTE — TELEPHONE ENCOUNTER
Samples of Eliquis 5 mg bid left at the  of the [facility] by Aaliyah Tyson.

## 2024-02-21 ENCOUNTER — OFFICE VISIT (OUTPATIENT)
Dept: ENT CLINIC | Age: 89
End: 2024-02-21
Payer: MEDICARE

## 2024-02-21 VITALS
DIASTOLIC BLOOD PRESSURE: 76 MMHG | SYSTOLIC BLOOD PRESSURE: 122 MMHG | BODY MASS INDEX: 20.32 KG/M2 | WEIGHT: 119 LBS | HEIGHT: 64 IN

## 2024-02-21 DIAGNOSIS — H61.21 IMPACTED CERUMEN, RIGHT EAR: Primary | ICD-10-CM

## 2024-02-21 PROCEDURE — 69210 REMOVE IMPACTED EAR WAX UNI: CPT | Performed by: OTOLARYNGOLOGY

## 2024-02-21 ASSESSMENT — ENCOUNTER SYMPTOMS
SHORTNESS OF BREATH: 0
SORE THROAT: 0
ABDOMINAL PAIN: 0

## 2024-02-21 NOTE — PROGRESS NOTES
Chief Complaint   Patient presents with    Cerumen Impaction     Patient is here today to get his ears cleaned.        HPI:  Jacqueline Weaver is a 88 y.o. female seen in follow-up for her ears.  She has a history of cerumen impaction and usually requires ear cleaning several times a year.  Last seen back on 11/27/23.  She comes in today with her  Wes who also needs ears cleaned out.  Her right ear feels more stopped up today, but there is no otalgia or otorrhea.  She continues to wear hearing aids bilaterally.    Past Medical History, Past Surgical History, Family history, Social History, and Medications were all reviewed with the patient today and updated as necessary.     Allergies   Allergen Reactions    Hydrocodone Other (See Comments)     \"too strong\"    Penicillins Itching    Sulfa Antibiotics Itching     Patient Active Problem List   Diagnosis    Abnormal EKG    Osteoarthritis of left knee    Status post total knee replacement, left    Bradycardia    Diaphoresis    Status post total right knee replacement    Elevated blood pressure reading    Malaise and fatigue    Paroxysmal atrial fibrillation (HCC)    Primary hypertension    Cerebrovascular accident (CVA) due to embolism of left middle cerebral artery (HCC)     Current Outpatient Medications   Medication Sig    ELIQUIS 2.5 MG TABS tablet Take 1 tablet by mouth 2 times daily    atorvastatin (LIPITOR) 80 MG tablet TAKE 1 TABLET (80 MG) BY MOUTH NIGHTLY    amLODIPine (NORVASC) 2.5 MG tablet Take 1 tablet by mouth daily    losartan-hydroCHLOROthiazide (HYZAAR) 100-25 MG per tablet Take 1 tablet by mouth daily    metoprolol succinate (TOPROL XL) 25 MG extended release tablet Take 0.5 tablets by mouth daily    temazepam (RESTORIL) 15 MG capsule 1 capsule at bedtime as needed Orally prn for 30 days    melatonin 3 MG TABS tablet Take 1 tablet by mouth daily    Misc Natural Products (CRANBERRY/PROBIOTIC PO) Take by mouth    DULoxetine (CYMBALTA) 60 MG

## 2024-04-02 ENCOUNTER — TELEPHONE (OUTPATIENT)
Age: 89
End: 2024-04-02

## 2024-04-02 NOTE — TELEPHONE ENCOUNTER
Spoke to Moraima and informed her 2 boxes will be set out at the  for pickup.    Patient has a a script on file with percy and will have it filled.

## 2024-04-02 NOTE — TELEPHONE ENCOUNTER
Moraima Graham called to request samples of ELIQUIS for PT. Would like a call back when they are ready for  [phone]

## 2024-04-21 NOTE — PROGRESS NOTES
[facility] CARDIOLOGY  [address] [place] [address], [address]  [place] [address]  PHONE: [phone]        NAME:  Jacqueline Weaver  : 1935  MRN: [id number]     PCP:  Erich Vance APRN - NP    SUBJECTIVE:   Jacqueline Weaver is a 88 y.o. female seen for a follow up visit regarding the following:     Chief Complaint   Patient presents with    Atrial Fibrillation    Bradycardia    Hypertension    Cerebrovascular Accident        HPI:  She presented recently to establish new cardiac care with a history of recent admission to [facility] on 2023 with an acute left MCA M1 occlusion acute onset right hemiplegia, left gaze preference and aphasia.  Interventional radiology assisted with mechanical thrombectomy, recanalization and reperfusion.    ECG :  Atrial fibrillation   ? atrial activity   Left anterior fascicular block   axis(240,-40), init forces inf   Probable left ventricular hypertrophy   (RaVL+SV3)xQRSd >300   Borderline prolonged QT interval   QTc >485mS     Tesha echo :    Normal left ventricle cavity size.     The left ventricular systolic function is normal (55-65%).     There is mild concentric left ventricular hypertrophy present.     Unable to assess left ventricular diastolic function.     Elevated left atrial pressure.     The right ventricular systolic function is normal.     The left atrium is severely dilated.     Mild mitral valve regurgitation.     Mild tricuspid valve regurgitation.     She was completely asymptomatic prior to the onset of her stroke symptoms with no angina, CHF, palpitations, presyncope or syncope.  She has no prior history of A-fib and was unaware that she was in A-fib at the time of her CVA.  Duration of the arrhythmia is unknown but she remains in A-fib today A-fib throughout the Holter monitor recently.  She is compliant with beta-blocker therapy with good heart rate and blood pressure control and compliant with Eliquis with no

## 2024-04-26 ENCOUNTER — OFFICE VISIT (OUTPATIENT)
Age: 89
End: 2024-04-26
Payer: MEDICARE

## 2024-04-26 VITALS
SYSTOLIC BLOOD PRESSURE: 136 MMHG | DIASTOLIC BLOOD PRESSURE: 80 MMHG | HEIGHT: 64 IN | HEART RATE: 50 BPM | BODY MASS INDEX: 20.66 KG/M2 | WEIGHT: 121 LBS

## 2024-04-26 DIAGNOSIS — R94.31 ABNORMAL EKG: ICD-10-CM

## 2024-04-26 DIAGNOSIS — R00.1 BRADYCARDIA: ICD-10-CM

## 2024-04-26 DIAGNOSIS — I63.412 CEREBROVASCULAR ACCIDENT (CVA) DUE TO EMBOLISM OF LEFT MIDDLE CEREBRAL ARTERY (HCC): ICD-10-CM

## 2024-04-26 DIAGNOSIS — I10 PRIMARY HYPERTENSION: ICD-10-CM

## 2024-04-26 DIAGNOSIS — I48.0 PAROXYSMAL ATRIAL FIBRILLATION (HCC): Primary | ICD-10-CM

## 2024-04-26 PROCEDURE — 93000 ELECTROCARDIOGRAM COMPLETE: CPT | Performed by: INTERNAL MEDICINE

## 2024-04-26 PROCEDURE — G8427 DOCREV CUR MEDS BY ELIG CLIN: HCPCS | Performed by: INTERNAL MEDICINE

## 2024-04-26 PROCEDURE — G8420 CALC BMI NORM PARAMETERS: HCPCS | Performed by: INTERNAL MEDICINE

## 2024-04-26 PROCEDURE — 1090F PRES/ABSN URINE INCON ASSESS: CPT | Performed by: INTERNAL MEDICINE

## 2024-04-26 PROCEDURE — 1036F TOBACCO NON-USER: CPT | Performed by: INTERNAL MEDICINE

## 2024-04-26 PROCEDURE — 99214 OFFICE O/P EST MOD 30 MIN: CPT | Performed by: INTERNAL MEDICINE

## 2024-04-26 PROCEDURE — 1123F ACP DISCUSS/DSCN MKR DOCD: CPT | Performed by: INTERNAL MEDICINE

## 2024-05-08 ENCOUNTER — TELEPHONE (OUTPATIENT)
Age: 89
End: 2024-05-08

## 2024-05-08 NOTE — TELEPHONE ENCOUNTER
Make sure she is off the metoprolol/Toprol-XL completely  Stop amlodipine completely  Continue losartan HCT but try to taking at least 60 to 70 ounces of nonalcoholic/noncaffeinated beverages daily  Call me in a week to 10 days with an update  We should have results from the Holter monitor by then as well

## 2024-05-08 NOTE — TELEPHONE ENCOUNTER
BP and pulse reading     5/4  104/67  74  5/5  118/71   72  5/6/  101/47  64  5/8/  102/52  57    Metoprolol has been discontinue and monitor has been sent

## 2024-05-08 NOTE — TELEPHONE ENCOUNTER
Informed Moraima, caregiver of Dr. Welsh' response. Moraima voiced understanding. Asks that response be sent in [employer]. Done.  cgh

## 2024-05-08 NOTE — TELEPHONE ENCOUNTER
Pt's Caregiver, Moraima and dtrJacinta on phone reporting:  Low BP HR readings.   Metoprolol d/c'd. Returned monitor.   BP and pulse readings      5/4       104/67             74  5/5       118/71             72  5/6/      101/47             64          5/7/        98/47  5/8/      102/52             57    Denies dizziness, light headed when stands.   Dizzy when she bends over.   Encourage 50-60 oz fluids daily.     Losartan HCTZ 100-25mg daily  Amlodipine 2.5mg daily. cgh

## 2024-05-14 ENCOUNTER — TELEPHONE (OUTPATIENT)
Age: 89
End: 2024-05-14

## 2024-05-14 NOTE — TELEPHONE ENCOUNTER
Per  \"Atrial Fibrillation occurred continuously (100% burden), ranging from  bpm (avg of 81 bpm).  Average heart rate is excellent.  No significant pauses and no severe daytime bradycardia.  Everything looks great on current meds.  Continue current meds without change, stay well-hydrated, keep routine follow-up\"

## 2024-05-14 NOTE — TELEPHONE ENCOUNTER
----- Message from Derian Welsh MD sent at 5/14/2024  7:40 AM EDT -----    ----- Message -----  From: Derian Welsh MD  Sent: 5/14/2024   7:40 AM EDT  To: Derian Welsh MD

## 2024-05-16 ENCOUNTER — TELEPHONE (OUTPATIENT)
Age: 89
End: 2024-05-16

## 2024-05-16 NOTE — TELEPHONE ENCOUNTER
Cut losartan HCT in half.  Hold for 24 hours completely.  Increase daily hydration and if systolics remain less than 100 next week we will consider decreasing meds even further.  Keep routine follow-up

## 2024-05-16 NOTE — TELEPHONE ENCOUNTER
Informed Moraima of Dr. Welsh' response. Moraima voiced understanding and asks for response to be sent via [employer]. Done.  cgh

## 2024-05-16 NOTE — TELEPHONE ENCOUNTER
Walked around the block twice yesterday and BP 81/55  Pulse 67  she is taking 1 or 2 naps 2 to 3 hours a day  water increased to 60 ounces a day Please call

## 2024-05-21 ENCOUNTER — HOSPITAL ENCOUNTER (OUTPATIENT)
Dept: PHYSICAL THERAPY | Age: 89
Setting detail: RECURRING SERIES
Discharge: HOME | End: 2024-05-24
Payer: MEDICARE

## 2024-05-21 DIAGNOSIS — I69.322 DYSARTHRIA FOLLOWING CEREBRAL INFARCTION: ICD-10-CM

## 2024-05-21 DIAGNOSIS — I69.320 APHASIA FOLLOWING CEREBRAL INFARCTION: ICD-10-CM

## 2024-05-21 DIAGNOSIS — I69.390 APRAXIA FOLLOWING CEREBRAL INFARCTION: Primary | ICD-10-CM

## 2024-05-21 PROCEDURE — 92523 SPEECH SOUND LANG COMPREHEN: CPT

## 2024-05-21 NOTE — THERAPY EVALUATION
Jacqueline Weaver  : 1935  Primary: [employer] [facility]mo  Secondary:  [facility] @ [address] RADER Lizet  [facility] [address]  Phone: [phone]  Fax: [phone]    Visit Info:    Effective Dates  2024 TO 2024   Frequency/Duration    1-2 time(s) a week for 60-90 days   SPEECH LANGUAGE PATHOLOGY: COMMUNICATION    Initial Assessment 2024     Appt Desk   Episode        Treatment Diagnosis:    Apraxia following cerebral infarction  Aphasia following cerebral infarction  Dysarthria following cerebral infarction  Medical/Referring Diagnosis:   Other speech disturbances   Referring Physician:  Alberto Salinas MD MD Orders:  Eval and Treat   Return MD Appt:  No current f/u  Date of Onset:  23  Allergies:  Hydrocodone, Penicillins, and Sulfa antibiotics  Medications Last Reviewed:  2024     SUBJECTIVE    History of Injury/Illness (Reason for Referral):  Per chart, patient is s/p CVA within the last year; still having problems with word finding. Presented to [facility] on 2023 as a stroke alert with right facial weakness, dysarthria, and aphasia. CT head without acute hemorrhage. She was subsequently treated with IV TNK. CTA head and neck revealed M1 thrombus and she is status post successful recanalization of a left M1 occlusion with TICI 2C flow. MRI brain was notable for left temporoparietal and caudate infarction and NIHSS improved to 4 before discharge.     Pt reports feeling she has improved some in the last 6 months. Has difficulty with multi-syllable words and /s/ words give her trouble. Does not notice word finding difficulty. Feels she is a social person. States she cannot project her voice to talk in groups, her voice feels very low. Reports her voice has been hoarse but she is unsure for how long. She is followed by ENT for impacted ear wax and has hearing aids.   Patient Stated Goal(s):  \"Just want to keep getting better.\"    Past Medical

## 2024-05-22 ENCOUNTER — TELEPHONE (OUTPATIENT)
Age: 89
End: 2024-05-22

## 2024-05-22 NOTE — TELEPHONE ENCOUNTER
Spoke to patients daughter and she is going to  samples and [place] [employer] application.

## 2024-05-24 ENCOUNTER — OFFICE VISIT (OUTPATIENT)
Dept: ENT CLINIC | Age: 89
End: 2024-05-24

## 2024-05-24 VITALS
BODY MASS INDEX: 20.83 KG/M2 | HEIGHT: 64 IN | SYSTOLIC BLOOD PRESSURE: 136 MMHG | WEIGHT: 122 LBS | DIASTOLIC BLOOD PRESSURE: 78 MMHG

## 2024-05-24 DIAGNOSIS — H61.21 IMPACTED CERUMEN, RIGHT EAR: Primary | ICD-10-CM

## 2024-05-24 ASSESSMENT — ENCOUNTER SYMPTOMS
ALLERGIC/IMMUNOLOGIC NEGATIVE: 1
EYES NEGATIVE: 1
GASTROINTESTINAL NEGATIVE: 1
RESPIRATORY NEGATIVE: 1

## 2024-05-24 NOTE — PROGRESS NOTES
Chief Complaint   Patient presents with    Follow-up     3 month ear cleaning.  Patient is doing great.         HPI:  Jacqueline Weaver is a 89 y.o. female seen in follow-up for her ears.  She has a long history of cerumen impaction and usually requires ear cleanings every 4 months.  Last seen back on 2/21/24.  Doing well since then, but she still has some aphasia since her previous CVA.  She still struggles to put her hearing aids in sometimes.    Past Medical History, Past Surgical History, Family history, Social History, and Medications were all reviewed with the patient today and updated as necessary.     Allergies   Allergen Reactions    Hydrocodone Other (See Comments)     \"too strong\"    Penicillins Itching    Sulfa Antibiotics Itching     Patient Active Problem List   Diagnosis    Abnormal EKG    Osteoarthritis of left knee    Status post total knee replacement, left    Bradycardia    Diaphoresis    Status post total right knee replacement    Elevated blood pressure reading    Malaise and fatigue    Paroxysmal atrial fibrillation (HCC)    Primary hypertension    Cerebrovascular accident (CVA) due to embolism of left middle cerebral artery (HCC)     Current Outpatient Medications   Medication Sig    ELIQUIS 2.5 MG TABS tablet Take 1 tablet by mouth 2 times daily    atorvastatin (LIPITOR) 80 MG tablet TAKE 1 TABLET (80 MG) BY MOUTH NIGHTLY    losartan-hydroCHLOROthiazide (HYZAAR) 100-25 MG per tablet Take 1 tablet by mouth daily (Patient taking differently: Take 0.5 tablets by mouth daily)    temazepam (RESTORIL) 15 MG capsule 1 capsule at bedtime as needed Orally prn for 30 days    melatonin 3 MG TABS tablet Take 1 tablet by mouth daily    Misc Natural Products (CRANBERRY/PROBIOTIC PO) Take by mouth    DULoxetine (CYMBALTA) 60 MG extended release capsule Take 1 capsule by mouth daily    Omega-3 Fatty Acids (FISH OIL) 1200 MG CAPS Take by mouth Twice a Week    Multiple Vitamins-Minerals (THERAPEUTIC

## 2024-05-27 PROBLEM — I48.11 LONGSTANDING PERSISTENT ATRIAL FIBRILLATION (HCC): Status: ACTIVE | Noted: 2023-06-16

## 2024-05-27 ASSESSMENT — ENCOUNTER SYMPTOMS
WHEEZING: 0
NAUSEA: 0
ABDOMINAL PAIN: 0
VOMITING: 0
SHORTNESS OF BREATH: 0
COUGH: 0
PHOTOPHOBIA: 0
ABDOMINAL DISTENTION: 0
CONSTIPATION: 0
DIARRHEA: 0

## 2024-05-27 NOTE — PROGRESS NOTES
[facility] CARDIOLOGY  [address] [place] [address], [address]  [place] [address]  PHONE: [phone]        NAME:  Jacquelien Weaver  : 1935  MRN: [id number]     PCP:  Erich Vance APRN - NP    SUBJECTIVE:   Jacqueline Weaver is a 89 y.o. female seen for a follow up visit regarding the following:     Chief Complaint   Patient presents with    Atrial Fibrillation    Cerebrovascular Accident    Hypertension        HPI:  She presented recently to establish new cardiac care with a history of recent admission to [facility] on 2023 with an acute left MCA M1 occlusion acute onset right hemiplegia, left gaze preference and aphasia.  Interventional radiology assisted with mechanical thrombectomy, recanalization and reperfusion.    ECG :  Atrial fibrillation   ? atrial activity   Left anterior fascicular block   axis(240,-40), init forces inf   Probable left ventricular hypertrophy   (RaVL+SV3)xQRSd >300   Borderline prolonged QT interval   QTc >485mS     Tesha echo :    Normal left ventricle cavity size.     The left ventricular systolic function is normal (55-65%).     There is mild concentric left ventricular hypertrophy present.     Unable to assess left ventricular diastolic function.     Elevated left atrial pressure.     The right ventricular systolic function is normal.     The left atrium is severely dilated.     Mild mitral valve regurgitation.     Mild tricuspid valve regurgitation.     She was completely asymptomatic prior to the onset of her stroke symptoms with no angina, CHF, palpitations, presyncope or syncope.  She has no prior history of A-fib and was unaware that she was in A-fib at the time of her CVA.  Duration of the arrhythmia is unknown but she remains in A-fib today, A-fib throughout the Holter monitor recently.  She is compliant with  Eliquis with no missed doses and no bleeding issues.       Holter 7/3/23:  A-fib throughout.  Intermittently bradycardic

## 2024-05-28 ENCOUNTER — OFFICE VISIT (OUTPATIENT)
Age: 89
End: 2024-05-28
Payer: MEDICARE

## 2024-05-28 VITALS
BODY MASS INDEX: 20.66 KG/M2 | SYSTOLIC BLOOD PRESSURE: 138 MMHG | HEIGHT: 64 IN | DIASTOLIC BLOOD PRESSURE: 80 MMHG | WEIGHT: 121 LBS | HEART RATE: 66 BPM

## 2024-05-28 DIAGNOSIS — I63.412 CEREBROVASCULAR ACCIDENT (CVA) DUE TO EMBOLISM OF LEFT MIDDLE CEREBRAL ARTERY (HCC): Primary | ICD-10-CM

## 2024-05-28 DIAGNOSIS — R94.31 ABNORMAL EKG: ICD-10-CM

## 2024-05-28 DIAGNOSIS — R00.1 BRADYCARDIA: ICD-10-CM

## 2024-05-28 DIAGNOSIS — I48.11 LONGSTANDING PERSISTENT ATRIAL FIBRILLATION (HCC): ICD-10-CM

## 2024-05-28 DIAGNOSIS — I10 PRIMARY HYPERTENSION: ICD-10-CM

## 2024-05-28 PROCEDURE — G8420 CALC BMI NORM PARAMETERS: HCPCS | Performed by: INTERNAL MEDICINE

## 2024-05-28 PROCEDURE — 1123F ACP DISCUSS/DSCN MKR DOCD: CPT | Performed by: INTERNAL MEDICINE

## 2024-05-28 PROCEDURE — 1036F TOBACCO NON-USER: CPT | Performed by: INTERNAL MEDICINE

## 2024-05-28 PROCEDURE — G8427 DOCREV CUR MEDS BY ELIG CLIN: HCPCS | Performed by: INTERNAL MEDICINE

## 2024-05-28 PROCEDURE — 1090F PRES/ABSN URINE INCON ASSESS: CPT | Performed by: INTERNAL MEDICINE

## 2024-05-28 PROCEDURE — 99214 OFFICE O/P EST MOD 30 MIN: CPT | Performed by: INTERNAL MEDICINE

## 2024-06-05 ENCOUNTER — TELEPHONE (OUTPATIENT)
Age: 89
End: 2024-06-05

## 2024-06-05 NOTE — TELEPHONE ENCOUNTER
Informed Moraima of Dr. Welsh' response. Moraima voiced understanding.  She states she thinks it's from O2 Concentrator blowing hot air on pt during the night. She's going to move it and see if that helps. Pt has f/u with PCP with labs in 1 mo.  cgh

## 2024-06-05 NOTE — TELEPHONE ENCOUNTER
I would run this by her primary care physician.  We have not changed any cardiac meds recently and she has been stable.  There is no obvious cardiac reason for her to be having night sweats.

## 2024-06-06 ENCOUNTER — HOSPITAL ENCOUNTER (OUTPATIENT)
Dept: PHYSICAL THERAPY | Age: 89
Setting detail: RECURRING SERIES
Discharge: HOME | End: 2024-06-09
Payer: MEDICARE

## 2024-06-06 PROCEDURE — 92507 TX SP LANG VOICE COMM INDIV: CPT

## 2024-06-06 NOTE — PROGRESS NOTES
Jacqueline Weaver  : 1935  Primary: Saleem Umana Plus Hmo  Secondary:  [facility] @ [address] RADER Lizet  [facility] [address]  Phone: [phone]  Fax: [phone]    Effective Dates:   2024 TO 2024   Frequency/Duration    1-2 time(s) a week for 60-90 days  SLP Visit Info:  No Show: 1 (24)  Total # of Visits to Date: 1        OUTPATIENT SPEECH PATHOLOGY NOTE:Daily Note 2024  Appt Desk   Episode  Charges Attendance Report   Events        Treatment Diagnosis:    Apraxia following cerebral infarction  Aphasia following cerebral infarction  Dysarthria following cerebral infarction  Medical/Referring Diagnosis:    Other speech disturbances   Referring Physician:  Alberto Salinas MD MD Orders:  Eval and Treat   Allergies:  Hydrocodone, Penicillins, and Sulfa antibiotics  Medications Last Reviewed:  2024  Subjective Comments:  Pt reports she saw ENT and he looked at her nose but not vocal cords  Pain:  Patient does not c/o pain.  Interventions Planned: (Treatment may consist of any combination of the following)        See Assessment Note  GOALS: (Goals have been discussed and agreed upon with patient.)  Short-Term Functional Goals: Time Frame: 12 weeks  Patient will fill in carrier phrase/complete automatic speech tasks using appropriate breath support with >80% accuracy given mod cues.  Patient will produce 3-5 syllable words with articulatory precision with 80% accuracy given min cues.  Patient will implement trained strategies (slow rate, over-articulation, pacing) with correct articulation with functional phrases with >80% accuracy given min cues.  Patient will learn and implement strategies (slow rate, over-articulation, pacing) to communicate information at the sentence level with 80% accuracy given min cues.   Patient will participate in 5 minute conversation with no more than 5 cues for use of strategies (slow rate, over-articulation,

## 2024-06-13 ENCOUNTER — HOSPITAL ENCOUNTER (OUTPATIENT)
Dept: PHYSICAL THERAPY | Age: 89
Setting detail: RECURRING SERIES
Discharge: HOME | End: 2024-06-16
Payer: MEDICARE

## 2024-06-13 PROCEDURE — 92507 TX SP LANG VOICE COMM INDIV: CPT

## 2024-06-13 NOTE — PROGRESS NOTES
Jacqueline Weaver  : 1935  Primary: Sale[facility] Umana Plus Hmo  Secondary:  [facility] @ [address] RADER Lizet  [place] [address]  Phone: [phone]  Fax: [phone]    Effective Dates:   2024 TO 2024   Frequency/Duration    1-2 time(s) a week for 60-90 days  SLP Visit Info:  No Show: 1 (24)  Total # of Visits to Date: 2        OUTPATIENT SPEECH PATHOLOGY NOTE:Daily Note 2024  Appt Desk   Episode  Charges Attendance Report   Events        Treatment Diagnosis:    Apraxia following cerebral infarction  Aphasia following cerebral infarction  Dysarthria following cerebral infarction  Medical/Referring Diagnosis:    Other speech disturbances   Referring Physician:  Alberto Salinas MD MD Orders:  Eval and Treat   Allergies:  Hydrocodone, Penicillins, and Sulfa antibiotics  Medications Last Reviewed:  2024  Subjective Comments:  Late arrival. Pt reports she did a lot talking over her trip to see family last weekend. Family reports noticing improvement with speech.   Pain:  Patient does not c/o pain.  Interventions Planned: (Treatment may consist of any combination of the following)        See Assessment Note  GOALS: (Goals have been discussed and agreed upon with patient.)  Short-Term Functional Goals: Time Frame: 12 weeks  Patient will fill in carrier phrase/complete automatic speech tasks using appropriate breath support with >80% accuracy given mod cues.  Patient will produce 3-5 syllable words with articulatory precision with 80% accuracy given min cues.  Patient will implement trained strategies (slow rate, over-articulation, pacing) with correct articulation with functional phrases with >80% accuracy given min cues.  Patient will learn and implement strategies (slow rate, over-articulation, pacing) to communicate information at the sentence level with 80% accuracy given min cues.   Patient will participate in 5 minute conversation with no more

## 2024-06-20 ENCOUNTER — HOSPITAL ENCOUNTER (OUTPATIENT)
Dept: PHYSICAL THERAPY | Age: 89
Setting detail: RECURRING SERIES
Discharge: HOME | End: 2024-06-23
Payer: MEDICARE

## 2024-06-20 PROCEDURE — 92507 TX SP LANG VOICE COMM INDIV: CPT

## 2024-06-20 NOTE — PROGRESS NOTES
Jacqueline Weaver  : 1935  Primary: Saleem Umana Plus Hmo  Secondary:  [facility] @ [address] RADER Lizet  [facility] [address]  Phone: [phone]  Fax: [phone]    Effective Dates:   2024 TO 2024   Frequency/Duration    1-2 time(s) a week for 60-90 days  SLP Visit Info:  No Show: 1 (24)  Total # of Visits to Date: 3        OUTPATIENT SPEECH PATHOLOGY NOTE:Daily Note 2024  Appt Desk   Episode  Charges Attendance Report   Events        Treatment Diagnosis:    Apraxia following cerebral infarction  Aphasia following cerebral infarction  Dysarthria following cerebral infarction  Medical/Referring Diagnosis:    Other speech disturbances   Referring Physician:  Alberto Salinas MD MD Orders:  Eval and Treat   Allergies:  Hydrocodone, Penicillins, and Sulfa antibiotics  Medications Last Reviewed:  2024  Subjective Comments:  Patient reports no pertinent updates.   Pain:  Patient does not c/o pain.  Interventions Planned: (Treatment may consist of any combination of the following)        See Assessment Note  GOALS: (Goals have been discussed and agreed upon with patient.)  Short-Term Functional Goals: Time Frame: 12 weeks  Patient will fill in carrier phrase/complete automatic speech tasks using appropriate breath support with >80% accuracy given mod cues.  Patient will produce 3-5 syllable words with articulatory precision with 80% accuracy given min cues.  Patient will implement trained strategies (slow rate, over-articulation, pacing) with correct articulation with functional phrases with >80% accuracy given min cues.  Patient will learn and implement strategies (slow rate, over-articulation, pacing) to communicate information at the sentence level with 80% accuracy given min cues.   Patient will participate in 5 minute conversation with no more than 5 cues for use of strategies (slow rate, over-articulation, pacing) to communicate

## 2024-06-27 ENCOUNTER — HOSPITAL ENCOUNTER (OUTPATIENT)
Dept: PHYSICAL THERAPY | Age: 89
Setting detail: RECURRING SERIES
Discharge: HOME | End: 2024-06-30
Payer: MEDICARE

## 2024-06-27 PROCEDURE — 92507 TX SP LANG VOICE COMM INDIV: CPT

## 2024-06-27 NOTE — PROGRESS NOTES
Jacqueline Weaver  : 1935  Primary: Saleem Umana [facility] Hmo  Secondary:  [facility] @ [address] RADER Lizet  [facility] [address]  Phone: [phone]  Fax: [phone]    Effective Dates:   2024 TO 2024   Frequency/Duration    1-2 time(s) a week for 60-90 days  SLP Visit Info:  No Show: 1 (24)  Total # of Visits to Date: 4        OUTPATIENT SPEECH PATHOLOGY NOTE:Daily Note 2024  Appt Desk   Episode  Charges Attendance Report   Events        Treatment Diagnosis:    Apraxia following cerebral infarction  Aphasia following cerebral infarction  Dysarthria following cerebral infarction  Medical/Referring Diagnosis:    Other speech disturbances   Referring Physician:  Alberto Salinas MD MD Orders:  Eval and Treat   Allergies:  Hydrocodone, Penicillins, and Sulfa antibiotics  Medications Last Reviewed:  2024  Subjective Comments:  Patient reports no pertinent updates.   Pain:  Patient does not c/o pain.  Interventions Planned: (Treatment may consist of any combination of the following)        See Assessment Note  GOALS: (Goals have been discussed and agreed upon with patient.)  Short-Term Functional Goals: Time Frame: 12 weeks  Patient will fill in carrier phrase/complete automatic speech tasks using appropriate breath support with >80% accuracy given mod cues.  Patient will produce 3-5 syllable words with articulatory precision with 80% accuracy given min cues.  Patient will implement trained strategies (slow rate, over-articulation, pacing) with correct articulation with functional phrases with >80% accuracy given min cues.  Patient will learn and implement strategies (slow rate, over-articulation, pacing) to communicate information at the sentence level with 80% accuracy given min cues.   Patient will participate in 5 minute conversation with no more than 5 cues for use of strategies (slow rate, over-articulation, pacing) to communicate

## 2024-07-01 NOTE — PROGRESS NOTES
Jacqueline Weaver  : 1935  Primary: Saleem Umana Plus Hmo  Secondary:  [facility] @ [address] RADER Lizet  [facility] [address]  Phone: [phone]  Fax: [phone]    Effective Dates:   2024 TO 2024   Frequency/Duration    1-2 time(s) a week for 60-90 days  SLP Visit Info:  No Show: 1 (24)  Total # of Visits to Date: 5      OUTPATIENT SPEECH PATHOLOGY NOTE:Daily Note 2024  Appt Desk   Episode  Charges Attendance Report   Events        Treatment Diagnosis:    Apraxia following cerebral infarction  Aphasia following cerebral infarction  Dysarthria following cerebral infarction  Medical/Referring Diagnosis:    Other speech disturbances   Referring Physician:  Alberto Salinas MD MD Orders:  Eval and Treat   Allergies:  Hydrocodone, Penicillins, and Sulfa antibiotics  Medications Last Reviewed:  2024  Subjective Comments:  Patient reports no pertinent updates.   Pain:  Patient does not c/o pain.  Interventions Planned: (Treatment may consist of any combination of the following)        See Assessment Note  GOALS: (Goals have been discussed and agreed upon with patient.)  Short-Term Functional Goals: Time Frame: 12 weeks  Patient will fill in carrier phrase/complete automatic speech tasks using appropriate breath support with >80% accuracy given mod cues.  Patient will produce 3-5 syllable words with articulatory precision with 80% accuracy given min cues.  Patient will implement trained strategies (slow rate, over-articulation, pacing) with correct articulation with functional phrases with >80% accuracy given min cues.  Patient will learn and implement strategies (slow rate, over-articulation, pacing) to communicate information at the sentence level with 80% accuracy given min cues.   Patient will participate in 5 minute conversation with no more than 5 cues for use of strategies (slow rate, over-articulation, pacing) to communicate effectively.

## 2024-07-02 ENCOUNTER — HOSPITAL ENCOUNTER (OUTPATIENT)
Dept: PHYSICAL THERAPY | Age: 89
Setting detail: RECURRING SERIES
Discharge: HOME | End: 2024-07-05
Payer: MEDICARE

## 2024-07-02 PROCEDURE — 92507 TX SP LANG VOICE COMM INDIV: CPT

## 2024-07-08 ENCOUNTER — TELEPHONE (OUTPATIENT)
Age: 89
End: 2024-07-08

## 2024-07-08 NOTE — TELEPHONE ENCOUNTER
2 boxes of samples set at the  with [email or web address] application. Spoke with Moraima and informed her samples were available for . Provided [place] information.

## 2024-07-10 NOTE — PROGRESS NOTES
Jacqueline Weaver  : 1935  Primary: Saleem Umana Plus Hmo  Secondary:  [facility] @ [address] RADER Lizet  [facility] [address]  Phone: [phone]  Fax: [phone]    Effective Dates:   2024 TO 2024   Frequency/Duration    1-2 time(s) a week for 60-90 days  SLP Visit Info:  No Show: 1 (24)  Total # of Visits to Date: 6      OUTPATIENT SPEECH PATHOLOGY NOTE:Daily Note 2024  Appt Desk   Episode  Charges Attendance Report   Events        Treatment Diagnosis:    Apraxia following cerebral infarction  Aphasia following cerebral infarction  Dysarthria following cerebral infarction  Medical/Referring Diagnosis:    Other speech disturbances   Referring Physician:  Alberto Salinas MD MD Orders:  Eval and Treat   Allergies:  Hydrocodone, Penicillins, and Sulfa antibiotics  Medications Last Reviewed:  2024  Subjective Comments:  Patient reports no pertinent updates.   Pain:  Patient does not c/o pain.  Interventions Planned: (Treatment may consist of any combination of the following)        See Assessment Note  GOALS: (Goals have been discussed and agreed upon with patient.)  Short-Term Functional Goals: Time Frame: 12 weeks  Patient will fill in carrier phrase/complete automatic speech tasks using appropriate breath support with >80% accuracy given mod cues.  Patient will produce 3-5 syllable words with articulatory precision with 80% accuracy given min cues.  Patient will implement trained strategies (slow rate, over-articulation, pacing) with correct articulation with functional phrases with >80% accuracy given min cues.  Patient will learn and implement strategies (slow rate, over-articulation, pacing) to communicate information at the sentence level with 80% accuracy given min cues.   Patient will participate in 5 minute conversation with no more than 5 cues for use of strategies (slow rate, over-articulation, pacing) to communicate effectively.

## 2024-07-11 ENCOUNTER — HOSPITAL ENCOUNTER (OUTPATIENT)
Dept: PHYSICAL THERAPY | Age: 89
Setting detail: RECURRING SERIES
Discharge: HOME | End: 2024-07-14
Payer: MEDICARE

## 2024-07-11 PROCEDURE — 92507 TX SP LANG VOICE COMM INDIV: CPT

## 2024-07-16 ENCOUNTER — HOSPITAL ENCOUNTER (OUTPATIENT)
Dept: PHYSICAL THERAPY | Age: 89
Setting detail: RECURRING SERIES
Discharge: HOME | End: 2024-07-19
Payer: MEDICARE

## 2024-07-16 PROCEDURE — 92507 TX SP LANG VOICE COMM INDIV: CPT

## 2024-07-16 NOTE — PROGRESS NOTES
Jacqueline Weaver  : 1935  Primary: Saleem Umana Plus Hmo  Secondary:  [facility] @ [address] RADER Lizet  [facility] [address]  Phone: [phone]  Fax: [phone]    Effective Dates:   2024 TO 2024   Frequency/Duration    1-2 time(s) a week for 60-90 days  SLP Visit Info:  No Show: 1 (24)  Total # of Visits to Date: 7      OUTPATIENT SPEECH PATHOLOGY NOTE:Daily Note 2024  Appt Desk   Episode  Charges Attendance Report   Events        Treatment Diagnosis:    Apraxia following cerebral infarction  Aphasia following cerebral infarction  Dysarthria following cerebral infarction  Medical/Referring Diagnosis:    Other speech disturbances   Referring Physician:  Alberto Salinas MD MD Orders:  Eval and Treat   Allergies:  Hydrocodone, Penicillins, and Sulfa antibiotics  Medications Last Reviewed:  2024  Subjective Comments:  Patient reports no pertinent updates.   Pain:  Patient does not c/o pain.  Interventions Planned: (Treatment may consist of any combination of the following)        See Assessment Note  GOALS: (Goals have been discussed and agreed upon with patient.)  Short-Term Functional Goals: Time Frame: 12 weeks  Patient will fill in carrier phrase/complete automatic speech tasks using appropriate breath support with >80% accuracy given mod cues.  Patient will produce 3-5 syllable words with articulatory precision with 80% accuracy given min cues.  Patient will implement trained strategies (slow rate, over-articulation, pacing) with correct articulation with functional phrases with >80% accuracy given min cues.  Patient will learn and implement strategies (slow rate, over-articulation, pacing) to communicate information at the sentence level with 80% accuracy given min cues.   Patient will participate in 5 minute conversation with no more than 5 cues for use of strategies (slow rate, over-articulation, pacing) to communicate effectively.

## 2024-07-24 NOTE — TELEPHONE ENCOUNTER
Spoke with Moraima and she would like Rx for Eliquis sent to [facility] pharmacy.     Requested Prescriptions     Pending Prescriptions Disp Refills    apixaban (ELIQUIS) 2.5 MG TABS tablet 180 tablet 3     Sig: Take 1 tablet by mouth 2 times daily     Verified rx. Last OV 05/28/24. Erx to pharm on file.

## 2024-07-24 NOTE — TELEPHONE ENCOUNTER
Moraima (pts caregiver would like a call back in regards to questions about the [place] pharmacy

## 2024-07-25 ENCOUNTER — HOSPITAL ENCOUNTER (OUTPATIENT)
Dept: PHYSICAL THERAPY | Age: 89
Setting detail: RECURRING SERIES
Discharge: HOME | End: 2024-07-28
Payer: MEDICARE

## 2024-07-25 PROCEDURE — 92507 TX SP LANG VOICE COMM INDIV: CPT

## 2024-07-25 NOTE — PROGRESS NOTES
often easier to produce.   Communication/Consultation:  None today  Recommendations/Intent for next treatment session: Next visit will focus on goals.    Total Duration:  Time In: 0950  Time Out: 1030  Minutes: 40    TARUN Patrick

## 2024-07-29 ENCOUNTER — TELEPHONE (OUTPATIENT)
Age: 89
End: 2024-07-29

## 2024-07-29 NOTE — TELEPHONE ENCOUNTER
Spoke to patient and informed her to have lab results faxed to our office for  to review. Pt verbalized understanding.

## 2024-07-30 ENCOUNTER — TELEPHONE (OUTPATIENT)
Age: 89
End: 2024-07-30

## 2024-07-30 NOTE — TELEPHONE ENCOUNTER
BP will always be highest in the mornings.  Take BP meds first thing in the morning.  Do not check blood pressure until after lunch or 4 to 5 hours after morning meds.  Make sure to rest and relax for 4 to 5 minutes prior to checking.  Do not check blood pressure if anxious, worried, upset, or irritated.  As long as systolics are 110s to 140 I am happy.  If they are consistently over 140 or consistently less than 110 call me and we will adjust medications.

## 2024-07-30 NOTE — TELEPHONE ENCOUNTER
Spoke with Moraima and she reports BP readings .   132/82   131/70 HR 90  These readings were taken after lunch   160/67 HR 65  170/110   168/98   These readings were taken in the morning.

## 2024-08-06 ENCOUNTER — APPOINTMENT (OUTPATIENT)
Dept: PHYSICAL THERAPY | Age: 89
End: 2024-08-06
Payer: MEDICARE

## 2024-08-13 ENCOUNTER — HOSPITAL ENCOUNTER (OUTPATIENT)
Dept: PHYSICAL THERAPY | Age: 89
Setting detail: RECURRING SERIES
Discharge: HOME | End: 2024-08-16
Payer: MEDICARE

## 2024-08-13 PROCEDURE — 92507 TX SP LANG VOICE COMM INDIV: CPT

## 2024-08-13 NOTE — PROGRESS NOTES
Jacqueline NADINE Weaver  : 1935  Primary: Saleme Umana Plus Hmo  Secondary:  [facility] @ [facility]  [address] RADER Lizet  [facility] [address]  Phone: [phone]  Fax: [phone]    Effective Dates:   2024 to 2024   Frequency/Duration    1 time(s) a week for 60-90 days  SLP Visit Info:  No Show: 1 (24)  Canceled Appointment: 1 (  in hospital)  Total # of Visits to Date: 9      OUTPATIENT SPEECH PATHOLOGY NOTE:Daily Note 2024  Appt Desk   Episode  Charges Attendance Report   Events        Treatment Diagnosis:    Apraxia following cerebral infarction  Aphasia following cerebral infarction  Dysarthria following cerebral infarction  Medical/Referring Diagnosis:    Other speech disturbances   Referring Physician:  Alberto Salinas MD MD Orders:  Eval and Treat   Allergies:  Hydrocodone, Penicillins, and Sulfa antibiotics  Medications Last Reviewed:  2024  Subjective Comments:  Pt reports increased stress with  in hospital and she is having imaging done, as she keeps losing weight.   Pain:  Patient does not c/o pain.  Interventions Planned: (Treatment may consist of any combination of the following)        See Assessment Note  GOALS:(Goals have been discussed and agreed upon with patient.)  Short-Term Functional Goals: Time Frame: 12 weeks   Patient will fill in carrier phrase/complete automatic speech tasks using appropriate breath support with >80% accuracy given mod cues.   Patient will produce 3-5 syllable words with articulatory precision with 80% accuracy given min cues.   Patient will learn and implement strategies (slow rate, over-articulation, pacing) to communicate information at the sentence level with 80% accuracy given min cues.   Patient will participate in 5 minute conversation with no more than 5 cues for use of strategies (slow rate, over-articulation, pacing) to communicate effectively.   Patient will report at least one

## 2024-08-20 ENCOUNTER — TELEPHONE (OUTPATIENT)
Age: 89
End: 2024-08-20

## 2024-08-20 ENCOUNTER — HOSPITAL ENCOUNTER (OUTPATIENT)
Dept: PHYSICAL THERAPY | Age: 89
Setting detail: RECURRING SERIES
Discharge: HOME | End: 2024-08-23
Payer: MEDICARE

## 2024-08-20 DIAGNOSIS — I48.11 LONGSTANDING PERSISTENT ATRIAL FIBRILLATION (HCC): ICD-10-CM

## 2024-08-20 DIAGNOSIS — I10 PRIMARY HYPERTENSION: Primary | ICD-10-CM

## 2024-08-20 PROCEDURE — 92507 TX SP LANG VOICE COMM INDIV: CPT

## 2024-08-20 NOTE — TELEPHONE ENCOUNTER
Spoke with Moraima and informed her of provider response. Verbalized understanding. Lab order placed will have drawn 09/12/24.

## 2024-08-20 NOTE — TELEPHONE ENCOUNTER
Liver looks a little irritated, probably from the high-dose statin.  Stop Lipitor 80 mg daily.  Hold statin completely for 3 weeks.  Recheck hepatic profile in 14 to 21 days.  If numbers have improved we will start 20 mg atorvastatin at that time and reassess.

## 2024-08-20 NOTE — PROGRESS NOTES
which trained strategies were utilized outside of therapy to improve communication each therapy session. (Goal met, though will continue to implement throughout plan of care)  Discharge Goals: Time Frame: 12 weeks  Patient will develop functional and intelligible speech and utilize compensatory strategies through the use of increased articulatory precision and speech prosody for effective communication at the modified independent level.     Updated Objective Findings:  None Today  Treatment   Motor speech  3 syllable words (3 sets completed): 77% accuracy; mod cues; cued to slow down, repeat the broken down syllables 3x, then repeating whole word to increase consistency   4 syllable words (3 sets completed): 74% accuracy; mod cues, cued to slow down, repeat the broken down syllables 3x, then repeating whole word to increase consistency   5 min conversation with 1 cue, self corrections x6, effectively using strategies with great fluency, slow rate, and pacing     Treatment/Session Summary:  Patient requires additional time for education and cueing. Mod cues continued as patient quickly produces targets and then repeats targets with errors quickly in succession without pause to use a strategy to correct the error. Continued to cue patient to say it to herself, slow rate, break down the syllables/tapping. Also benefits from moving on to a different target and then returning to the one that gave difficulty at a later time.  For upcoming sessions: (lunch with friends, checking out at the grocery store, appointment, Jazmin's lunch next week, [employer] for anniversary dinner). Patient benefits from cues to slow pace.   Communication/Consultation:  None today  Recommendations/Intent for next treatment session: Next visit will focus on goals.    Total Duration:  Time In: 0951  Time Out: 1030  Minutes: 39    Keiko Jones, SLP

## 2024-08-20 NOTE — TELEPHONE ENCOUNTER
Is she suppose to be on 80mg Lipitor because her  takes 20mg And she thinks her dosage is too high Please call

## 2024-08-30 ENCOUNTER — HOSPITAL ENCOUNTER (OUTPATIENT)
Dept: PHYSICAL THERAPY | Age: 89
Setting detail: RECURRING SERIES
End: 2024-08-30
Payer: MEDICARE

## 2024-08-30 PROCEDURE — 92507 TX SP LANG VOICE COMM INDIV: CPT

## 2024-08-30 NOTE — PROGRESS NOTES
Jacqueline Weaver  : 1935  Primary: Saleem Umana Plus Hmo  Secondary:  [facility] @ [facility]  [address] RADER Lizet  [facility] [address]  Phone: [phone]  Fax: [phone]    Effective Dates:   2024 to 2024   Frequency/Duration    1 time(s) a week for 60-90 days  SLP Visit Info:  No Show: 1 (24)  Canceled Appointment: 1 (  in hospital)  Total # of Visits to Date: 11      OUTPATIENT SPEECH PATHOLOGY NOTE:Daily Note 2024  Appt Desk   Episode  Charges Attendance Report   Events        Treatment Diagnosis:    Apraxia following cerebral infarction  Aphasia following cerebral infarction  Dysarthria following cerebral infarction  Medical/Referring Diagnosis:    Other speech disturbances   Referring Physician:  Alberto Salinas MD MD Orders:  Eval and Treat   Allergies:  Hydrocodone, Penicillins, and Sulfa antibiotics  Medications Last Reviewed:  2024  Subjective Comments:  Pt reports she will be able to attend next week's session because she will start with ENT at 9:00, not 9:30 as appointment is listed.  Pain:  Patient does not c/o pain.  Interventions Planned: (Treatment may consist of any combination of the following)        See Assessment Note  GOALS:(Goals have been discussed and agreed upon with patient.)  Short-Term Functional Goals: Time Frame: 12 weeks   Patient will fill in carrier phrase/complete automatic speech tasks using appropriate breath support with >80% accuracy given mod cues.   Patient will produce 3-5 syllable words with articulatory precision with 80% accuracy given min cues.   Patient will learn and implement strategies (slow rate, over-articulation, pacing) to communicate information at the sentence level with 80% accuracy given min cues.   Patient will participate in 5 minute conversation with no more than 5 cues for use of strategies (slow rate, over-articulation, pacing) to communicate effectively.   Patient will  grocery store, appointment, [place]'s lunch next week, [employer] marcos for anniversary dinner). Patient benefits from cues to slow pace.   Communication/Consultation:  None today  Recommendations/Intent for next treatment session: Next visit will focus on goals.    Total Duration:  Time In: 0945  Time Out: 1025  Minutes: 40    Keiko Jones, SLP

## 2024-09-05 ENCOUNTER — OFFICE VISIT (OUTPATIENT)
Dept: ENT CLINIC | Age: 89
End: 2024-09-05
Payer: MEDICARE

## 2024-09-05 ENCOUNTER — HOSPITAL ENCOUNTER (OUTPATIENT)
Dept: PHYSICAL THERAPY | Age: 89
Setting detail: RECURRING SERIES
Discharge: HOME | End: 2024-09-08
Payer: MEDICARE

## 2024-09-05 ENCOUNTER — TELEPHONE (OUTPATIENT)
Age: 89
End: 2024-09-05

## 2024-09-05 VITALS — BODY MASS INDEX: 20.25 KG/M2 | HEIGHT: 64 IN | WEIGHT: 118.6 LBS

## 2024-09-05 DIAGNOSIS — I63.412 CEREBROVASCULAR ACCIDENT (CVA) DUE TO EMBOLISM OF LEFT MIDDLE CEREBRAL ARTERY (HCC): Primary | ICD-10-CM

## 2024-09-05 DIAGNOSIS — I48.11 LONGSTANDING PERSISTENT ATRIAL FIBRILLATION (HCC): ICD-10-CM

## 2024-09-05 DIAGNOSIS — H90.3 SENSORINEURAL HEARING LOSS, BILATERAL: Chronic | ICD-10-CM

## 2024-09-05 DIAGNOSIS — H61.23 BILATERAL IMPACTED CERUMEN: Primary | Chronic | ICD-10-CM

## 2024-09-05 PROCEDURE — 1123F ACP DISCUSS/DSCN MKR DOCD: CPT | Performed by: PHYSICIAN ASSISTANT

## 2024-09-05 PROCEDURE — G8420 CALC BMI NORM PARAMETERS: HCPCS | Performed by: PHYSICIAN ASSISTANT

## 2024-09-05 PROCEDURE — 92507 TX SP LANG VOICE COMM INDIV: CPT

## 2024-09-05 PROCEDURE — 69210 REMOVE IMPACTED EAR WAX UNI: CPT | Performed by: PHYSICIAN ASSISTANT

## 2024-09-05 PROCEDURE — 99213 OFFICE O/P EST LOW 20 MIN: CPT | Performed by: PHYSICIAN ASSISTANT

## 2024-09-05 PROCEDURE — G8427 DOCREV CUR MEDS BY ELIG CLIN: HCPCS | Performed by: PHYSICIAN ASSISTANT

## 2024-09-05 PROCEDURE — 1090F PRES/ABSN URINE INCON ASSESS: CPT | Performed by: PHYSICIAN ASSISTANT

## 2024-09-05 PROCEDURE — 1036F TOBACCO NON-USER: CPT | Performed by: PHYSICIAN ASSISTANT

## 2024-09-05 RX ORDER — AMLODIPINE BESYLATE 2.5 MG/1
TABLET ORAL
COMMUNITY

## 2024-09-05 RX ORDER — FLUOROURACIL 50 MG/G
CREAM TOPICAL
COMMUNITY
Start: 2024-08-26

## 2024-09-05 RX ORDER — KETOCONAZOLE 20 MG/G
CREAM TOPICAL
COMMUNITY
Start: 2024-08-29

## 2024-09-05 RX ORDER — METOPROLOL SUCCINATE 25 MG/1
TABLET, EXTENDED RELEASE ORAL
COMMUNITY

## 2024-09-05 ASSESSMENT — ENCOUNTER SYMPTOMS
EYES NEGATIVE: 1
GASTROINTESTINAL NEGATIVE: 1
RESPIRATORY NEGATIVE: 1
ALLERGIC/IMMUNOLOGIC NEGATIVE: 1

## 2024-09-05 NOTE — PROGRESS NOTES
Jacqueline Weaver is a 89 y.o. female presents today with c/o cerumen impaction. She comes in for regular cleaning, known hearing loss. No changes over the interval.     Chief Complaint   Patient presents with    Cerumen Impaction     EWR.  States that she is doing well, ears are doing good.  Denies pain, drainage, and itching.  No other complaints at this time.         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    HENT: Negative.          Cerumen   Eyes: Negative.    Respiratory: Negative.     Cardiovascular: Negative.    Gastrointestinal: Negative.    Endocrine: Negative.    Genitourinary: Negative.    Musculoskeletal: Negative.    Skin: Negative.    Allergic/Immunologic: Negative.    Neurological: Negative.    Hematological: Negative.    Psychiatric/Behavioral: Negative.          Ht 1.626 m (5' 4\")   Wt 53.8 kg (118 lb 9.6 oz)   BMI 20.36 kg/m²     Physical Exam:    General: Well developed, well nourished, in no acute distress. Appearance is consistent with stated age.  Communication: The patient communicates appropriately for their age.  Voice: Normal.  Head, Face, and Salivary Glands: No head or facial abnormalities present, No masses or lesions present, Overall appearance is normal, No abnormality of parotid or submandibular glands present.       External Ears: appearance is normal with no scars, lesions or masses. Pt is hard of hearing.   Right Ear:  Canal is

## 2024-09-05 NOTE — TELEPHONE ENCOUNTER
Spoke to Moraima and informed her of provider response. Verbalized understanding. Echo order placed.

## 2024-09-05 NOTE — TELEPHONE ENCOUNTER
Okay to hold off on the liver tests if they want to wait until the end of September.  Just get hepatic panel last week of September but continue to hold statin for now.

## 2024-09-05 NOTE — TELEPHONE ENCOUNTER
This is frequently over called on CT scans and turns out to be fine.  She does not have any heart failure clinically and her last echo a year ago looked good with no chamber dilatation or weakened heart muscle.  Go ahead and recheck an echo just to be safe in our office and I will call her with the results.

## 2024-09-12 ENCOUNTER — HOSPITAL ENCOUNTER (OUTPATIENT)
Dept: PHYSICAL THERAPY | Age: 89
Setting detail: RECURRING SERIES
Discharge: HOME | End: 2024-09-15
Payer: MEDICARE

## 2024-09-12 PROCEDURE — 92507 TX SP LANG VOICE COMM INDIV: CPT

## 2024-09-19 ENCOUNTER — HOSPITAL ENCOUNTER (OUTPATIENT)
Dept: PHYSICAL THERAPY | Age: 89
Setting detail: RECURRING SERIES
Discharge: HOME | End: 2024-09-22
Payer: MEDICARE

## 2024-09-19 PROCEDURE — 92507 TX SP LANG VOICE COMM INDIV: CPT

## 2024-09-26 ENCOUNTER — HOSPITAL ENCOUNTER (OUTPATIENT)
Dept: PHYSICAL THERAPY | Age: 89
Setting detail: RECURRING SERIES
Discharge: HOME | End: 2024-09-29
Payer: MEDICARE

## 2024-09-26 PROCEDURE — 92507 TX SP LANG VOICE COMM INDIV: CPT

## 2024-09-26 NOTE — PROGRESS NOTES
communicate effectively.   Patient will report at least one situation in which trained strategies were utilized outside of therapy to improve communication each therapy session. (Goal met, though will continue to implement throughout plan of care)  Discharge Goals: Time Frame: 12 weeks  Patient will develop functional and intelligible speech and utilize compensatory strategies through the use of increased articulatory precision and speech prosody for effective communication at the modified independent level.     Updated Objective Findings:  None Today  Treatment   Motor speech  Diaphragmatic breathing exercise with min cues 3 mins; to begin and assist patient in slowing down prior to beginning speech tasks.   Coordination of respiration and phonation with automatic speech tasks with 80% accuracy, min cues  3 syllable words: 90% accuracy; min cues; cued to slow down, repeat the broken down syllables 2x, then repeating whole word to increase consistency as needed  4 syllable words: 85% accuracy; min cues, cued to slow down, repeat the broken down syllables 2x, then repeating whole word to increase consistency as needed  Reading ingredients for a recipe with 90% accuracy with  modified independence  Reading steps to recipe with 80% accuracy, min cues, self corrections x6  Script training: --  Conversation with use of strategies (5 minutes) with 4 cues, self corrections noted x5  Reports use of strategies to discuss plans with ladies at bridge last night.     Treatment/Session Summary:  Pt noted with improving carryover of strategies to conversational level  Communication/Consultation:  None today  Recommendations/Intent for next treatment session: Next visit will focus on goals.    Total Duration:  Time In: 0947  Time Out: 1030  Minutes: 43    Keiko Jones, SLP

## 2024-10-15 ENCOUNTER — TELEPHONE (OUTPATIENT)
Age: 89
End: 2024-10-15

## 2024-10-15 NOTE — TELEPHONE ENCOUNTER
Patient called stating she has the following needs :    Had labs drawn  Would like for Dr Welsh to review her labs  Would like to discuss the results    Please call and advise.

## 2024-10-21 ENCOUNTER — OFFICE VISIT (OUTPATIENT)
Age: 89
End: 2024-10-21
Payer: MEDICARE

## 2024-10-21 VITALS
HEART RATE: 62 BPM | WEIGHT: 117.9 LBS | DIASTOLIC BLOOD PRESSURE: 82 MMHG | HEIGHT: 64 IN | SYSTOLIC BLOOD PRESSURE: 134 MMHG | BODY MASS INDEX: 20.13 KG/M2

## 2024-10-21 DIAGNOSIS — I63.412 CEREBROVASCULAR ACCIDENT (CVA) DUE TO EMBOLISM OF LEFT MIDDLE CEREBRAL ARTERY (HCC): Primary | ICD-10-CM

## 2024-10-21 DIAGNOSIS — I48.11 LONGSTANDING PERSISTENT ATRIAL FIBRILLATION (HCC): ICD-10-CM

## 2024-10-21 DIAGNOSIS — I48.0 PAROXYSMAL ATRIAL FIBRILLATION (HCC): Primary | ICD-10-CM

## 2024-10-21 DIAGNOSIS — I63.412 CEREBROVASCULAR ACCIDENT (CVA) DUE TO EMBOLISM OF LEFT MIDDLE CEREBRAL ARTERY (HCC): ICD-10-CM

## 2024-10-21 PROCEDURE — 1090F PRES/ABSN URINE INCON ASSESS: CPT | Performed by: INTERNAL MEDICINE

## 2024-10-21 PROCEDURE — G8420 CALC BMI NORM PARAMETERS: HCPCS | Performed by: INTERNAL MEDICINE

## 2024-10-21 PROCEDURE — G8427 DOCREV CUR MEDS BY ELIG CLIN: HCPCS | Performed by: INTERNAL MEDICINE

## 2024-10-21 PROCEDURE — G8484 FLU IMMUNIZE NO ADMIN: HCPCS | Performed by: INTERNAL MEDICINE

## 2024-10-21 PROCEDURE — 93000 ELECTROCARDIOGRAM COMPLETE: CPT | Performed by: INTERNAL MEDICINE

## 2024-10-21 PROCEDURE — 99214 OFFICE O/P EST MOD 30 MIN: CPT | Performed by: INTERNAL MEDICINE

## 2024-10-21 PROCEDURE — 1123F ACP DISCUSS/DSCN MKR DOCD: CPT | Performed by: INTERNAL MEDICINE

## 2024-10-21 PROCEDURE — 1036F TOBACCO NON-USER: CPT | Performed by: INTERNAL MEDICINE

## 2024-10-21 RX ORDER — LOSARTAN POTASSIUM 100 MG/1
100 TABLET ORAL DAILY
Qty: 90 TABLET | Refills: 1 | Status: SHIPPED | OUTPATIENT
Start: 2024-10-21 | End: 2024-10-21 | Stop reason: SDUPTHER

## 2024-10-21 RX ORDER — LOSARTAN POTASSIUM 100 MG/1
100 TABLET ORAL DAILY
Qty: 90 TABLET | Refills: 3 | Status: CANCELLED | OUTPATIENT
Start: 2024-10-21

## 2024-10-21 RX ORDER — LOSARTAN POTASSIUM 100 MG/1
100 TABLET ORAL DAILY
Qty: 90 TABLET | Refills: 3 | Status: SHIPPED | OUTPATIENT
Start: 2024-10-21

## 2024-10-21 ASSESSMENT — ENCOUNTER SYMPTOMS
VOMITING: 0
ABDOMINAL DISTENTION: 0
ABDOMINAL PAIN: 0
CONSTIPATION: 0
NAUSEA: 0
COUGH: 0
DIARRHEA: 0
PHOTOPHOBIA: 0
WHEEZING: 0
SHORTNESS OF BREATH: 0

## 2024-10-21 NOTE — TELEPHONE ENCOUNTER
Fine to take over-the-counter potassium but only for about a week.  Recheck BMP.  Okay to switch to local [facility] pharmacy for prescription.

## 2024-10-21 NOTE — PROGRESS NOTES
[facility] CARDIOLOGY  [address] [place] [address], [address]  [place] [address]  PHONE: [phone]        NAME:  Jacqueline Weaver  : 1935  MRN: [id number]     PCP:  Erich Vance APRN - NP    SUBJECTIVE:   Jacqueline Weaver is a 89 y.o. female seen for a follow up visit regarding the following:     Chief Complaint   Patient presents with    Atrial Fibrillation    Follow-up        HPI:  She presented recently to establish new cardiac care with a history of recent admission to [facility] on 2023 with an acute left MCA M1 occlusion acute onset right hemiplegia, left gaze preference and aphasia.  Interventional radiology assisted with mechanical thrombectomy, recanalization and reperfusion.    ECG :  Atrial fibrillation   ? atrial activity   Left anterior fascicular block   axis(240,-40), init forces inf   Probable left ventricular hypertrophy   (RaVL+SV3)xQRSd >300   Borderline prolonged QT interval   QTc >485mS     Tesha echo :    Normal left ventricle cavity size.     The left ventricular systolic function is normal (55-65%).     There is mild concentric left ventricular hypertrophy present.     Unable to assess left ventricular diastolic function.     Elevated left atrial pressure.     The right ventricular systolic function is normal.     The left atrium is severely dilated.     Mild mitral valve regurgitation.     Mild tricuspid valve regurgitation.     She was completely asymptomatic prior to the onset of her stroke symptoms with no angina, CHF, palpitations, presyncope or syncope.  She has no prior history of A-fib and was unaware that she was in A-fib at the time of her CVA.  Duration of the arrhythmia is unknown but she remains in A-fib today, A-fib throughout the Holter monitor recently.  She is compliant with  Eliquis with no missed doses and no bleeding issues.       Holter 7/3/23:  A-fib throughout.  Intermittently bradycardic during early a.m. hours

## 2024-10-21 NOTE — TELEPHONE ENCOUNTER
Called pt, losartan was sent to [facility] home delivery. She is requesting it be sent to local [facility] pharmacy.    She also stated that her potassium is 3.3. She wants to know if it is okay for her to take OTC potassium 99 mg

## 2024-11-04 ENCOUNTER — TELEPHONE (OUTPATIENT)
Age: 89
End: 2024-11-04

## 2024-12-09 ENCOUNTER — TELEPHONE (OUTPATIENT)
Age: 89
End: 2024-12-09

## 2024-12-09 NOTE — TELEPHONE ENCOUNTER
----- Message from Dr. Derian Welsh MD sent at 12/8/2024 10:10 AM EST -----  Echo looks good. No major abnormalities. Continue current meds and keep follow up visit.

## 2024-12-11 ENCOUNTER — OFFICE VISIT (OUTPATIENT)
Dept: ENT CLINIC | Age: 89
End: 2024-12-11
Payer: MEDICARE

## 2024-12-11 VITALS — BODY MASS INDEX: 20.59 KG/M2 | RESPIRATION RATE: 10 BRPM | HEIGHT: 64 IN | WEIGHT: 120.6 LBS

## 2024-12-11 DIAGNOSIS — H61.21 IMPACTED CERUMEN, RIGHT EAR: Primary | ICD-10-CM

## 2024-12-11 PROCEDURE — 69210 REMOVE IMPACTED EAR WAX UNI: CPT | Performed by: OTOLARYNGOLOGY

## 2024-12-11 ASSESSMENT — ENCOUNTER SYMPTOMS
RESPIRATORY NEGATIVE: 1
GASTROINTESTINAL NEGATIVE: 1
ABDOMINAL DISTENTION: 0
PHOTOPHOBIA: 0
VOMITING: 0
SHORTNESS OF BREATH: 0
EYES NEGATIVE: 1
COUGH: 0
CONSTIPATION: 0
ALLERGIC/IMMUNOLOGIC NEGATIVE: 1
ABDOMINAL PAIN: 0
DIARRHEA: 0
WHEEZING: 0
NAUSEA: 0

## 2024-12-11 NOTE — PROGRESS NOTES
[facility] CARDIOLOGY  [address] [place] [address], [address]  [place] [address]  PHONE: [phone]        NAME:  Jacqueline Weaver  : 1935  MRN: [id number]     PCP:  Erich Vance APRN - NP    SUBJECTIVE:   Jacqueline Weaver is a 89 y.o. female seen for a follow up visit regarding the following:     Chief Complaint   Patient presents with    Atrial Fibrillation        HPI:  She presented recently to establish new cardiac care with a history of recent admission to [facility] on 2023 with an acute left MCA M1 occlusion acute onset right hemiplegia, left gaze preference and aphasia.  Interventional radiology assisted with mechanical thrombectomy, recanalization and reperfusion.    ECG :  Atrial fibrillation   ? atrial activity   Left anterior fascicular block   axis(240,-40), init forces inf   Probable left ventricular hypertrophy   (RaVL+SV3)xQRSd >300   Borderline prolonged QT interval   QTc >485mS     Tesha echo :    Normal left ventricle cavity size.     The left ventricular systolic function is normal (55-65%).     There is mild concentric left ventricular hypertrophy present.     Unable to assess left ventricular diastolic function.     Elevated left atrial pressure.     The right ventricular systolic function is normal.     The left atrium is severely dilated.     Mild mitral valve regurgitation.     Mild tricuspid valve regurgitation.     She was completely asymptomatic prior to the onset of her stroke symptoms with no angina, CHF, palpitations, presyncope or syncope.  She has no prior history of A-fib and was unaware that she was in A-fib at the time of her CVA.  Duration of the arrhythmia is unknown but she remains in A-fib today, A-fib throughout the Holter monitor recently.  She is compliant with  Eliquis with no missed doses and no bleeding issues.       Holter 7/3/23:  A-fib throughout.  Intermittently bradycardic during early a.m. hours (probably sleep)

## 2024-12-11 NOTE — PROGRESS NOTES
Chief Complaint   Patient presents with    Follow-up     4 mos ear cleaning        HPI:  Jacqueline Weaver is a 89 y.o. female seen in follow-up for her ears.  She has a long history of cerumen impaction and requires frequent ear cleanings.  I last saw her back in May, and more recently she saw our PA Sofía back on 9/5/24 today, her right ear feels stopped up with more muffled hearing, but there is no otalgia or otorrhea.  Unfortunately, her  Wes passed away earlier this fall.    Past Medical History, Past Surgical History, Family history, Social History, and Medications were all reviewed with the patient today and updated as necessary.     Allergies   Allergen Reactions    Hydrocodone Other (See Comments)     \"too strong\"    Penicillins Itching    Sulfa Antibiotics Itching     Patient Active Problem List   Diagnosis    Abnormal EKG    Osteoarthritis of left knee    Status post total knee replacement, left    Bradycardia    Diaphoresis    Status post total right knee replacement    Elevated blood pressure reading    Malaise and fatigue    Longstanding persistent atrial fibrillation (HCC)    Primary hypertension    Cerebrovascular accident (CVA) due to embolism of left middle cerebral artery (HCC)     Current Outpatient Medications   Medication Sig    losartan (COZAAR) 100 MG tablet Take 1 tablet by mouth daily    fluorouracil (EFUDEX) 5 % cream as needed    ketoconazole (NIZORAL) 2 % cream as needed    apixaban (ELIQUIS) 2.5 MG TABS tablet Take 1 tablet by mouth 2 times daily    temazepam (RESTORIL) 15 MG capsule 1 capsule at bedtime as needed Orally prn for 30 days    melatonin 3 MG TABS tablet Take 1 tablet by mouth as needed    DULoxetine (CYMBALTA) 60 MG extended release capsule Take 30 mg by mouth daily    Omega-3 Fatty Acids (FISH OIL) 1200 MG CAPS Take by mouth Twice a Week    Multiple Vitamins-Minerals (THERAPEUTIC MULTIVITAMIN-MINERALS) tablet Take 1 tablet by mouth daily    vitamin D3

## 2024-12-13 ENCOUNTER — OFFICE VISIT (OUTPATIENT)
Age: 89
End: 2024-12-13
Payer: MEDICARE

## 2024-12-13 VITALS
HEIGHT: 64 IN | HEART RATE: 76 BPM | SYSTOLIC BLOOD PRESSURE: 118 MMHG | BODY MASS INDEX: 20.49 KG/M2 | WEIGHT: 120 LBS | DIASTOLIC BLOOD PRESSURE: 74 MMHG

## 2024-12-13 DIAGNOSIS — I63.412 CEREBROVASCULAR ACCIDENT (CVA) DUE TO EMBOLISM OF LEFT MIDDLE CEREBRAL ARTERY (HCC): Primary | ICD-10-CM

## 2024-12-13 DIAGNOSIS — I48.11 LONGSTANDING PERSISTENT ATRIAL FIBRILLATION (HCC): ICD-10-CM

## 2024-12-13 DIAGNOSIS — I10 PRIMARY HYPERTENSION: ICD-10-CM

## 2024-12-13 PROCEDURE — 1159F MED LIST DOCD IN RCRD: CPT | Performed by: INTERNAL MEDICINE

## 2024-12-13 PROCEDURE — 99214 OFFICE O/P EST MOD 30 MIN: CPT | Performed by: INTERNAL MEDICINE

## 2024-12-13 PROCEDURE — G8420 CALC BMI NORM PARAMETERS: HCPCS | Performed by: INTERNAL MEDICINE

## 2024-12-13 PROCEDURE — 1036F TOBACCO NON-USER: CPT | Performed by: INTERNAL MEDICINE

## 2024-12-13 PROCEDURE — 1123F ACP DISCUSS/DSCN MKR DOCD: CPT | Performed by: INTERNAL MEDICINE

## 2024-12-13 PROCEDURE — 1090F PRES/ABSN URINE INCON ASSESS: CPT | Performed by: INTERNAL MEDICINE

## 2024-12-13 PROCEDURE — G8484 FLU IMMUNIZE NO ADMIN: HCPCS | Performed by: INTERNAL MEDICINE

## 2024-12-13 PROCEDURE — 1160F RVW MEDS BY RX/DR IN RCRD: CPT | Performed by: INTERNAL MEDICINE

## 2024-12-13 PROCEDURE — G8427 DOCREV CUR MEDS BY ELIG CLIN: HCPCS | Performed by: INTERNAL MEDICINE

## 2024-12-13 RX ORDER — ROSUVASTATIN CALCIUM 5 MG/1
5 TABLET, COATED ORAL DAILY
Qty: 30 TABLET | Refills: 11 | Status: SHIPPED | OUTPATIENT
Start: 2024-12-13

## 2025-01-06 ENCOUNTER — HOSPITAL ENCOUNTER (OUTPATIENT)
Dept: CT IMAGING | Age: 89
Discharge: HOME | End: 2025-01-09
Attending: INTERNAL MEDICINE
Payer: MEDICARE

## 2025-01-06 ENCOUNTER — TELEPHONE (OUTPATIENT)
Age: 89
End: 2025-01-06

## 2025-01-06 DIAGNOSIS — I48.11 LONGSTANDING PERSISTENT ATRIAL FIBRILLATION (HCC): ICD-10-CM

## 2025-01-06 DIAGNOSIS — I63.412 CEREBROVASCULAR ACCIDENT (CVA) DUE TO EMBOLISM OF LEFT MIDDLE CEREBRAL ARTERY (HCC): ICD-10-CM

## 2025-01-06 DIAGNOSIS — I48.0 PAROXYSMAL ATRIAL FIBRILLATION (HCC): ICD-10-CM

## 2025-01-06 DIAGNOSIS — I10 PRIMARY HYPERTENSION: ICD-10-CM

## 2025-01-06 LAB
ALBUMIN SERPL-MCNC: 3.5 G/DL (ref 3.2–4.6)
ALBUMIN/GLOB SERPL: 1 (ref 1–1.9)
ALP SERPL-CCNC: 80 U/L (ref 35–104)
ALT SERPL-CCNC: 27 U/L (ref 8–45)
ANION GAP SERPL CALC-SCNC: 10 MMOL/L (ref 7–16)
AST SERPL-CCNC: 40 U/L (ref 15–37)
BILIRUB DIRECT SERPL-MCNC: 0.2 MG/DL (ref 0–0.4)
BILIRUB SERPL-MCNC: 0.7 MG/DL (ref 0–1.2)
BUN SERPL-MCNC: 26 MG/DL (ref 8–23)
CALCIUM SERPL-MCNC: 9.6 MG/DL (ref 8.8–10.2)
CHLORIDE SERPL-SCNC: 101 MMOL/L (ref 98–107)
CO2 SERPL-SCNC: 30 MMOL/L (ref 20–29)
CREAT BLD-MCNC: 0.74 MG/DL (ref 0.8–1.5)
CREAT SERPL-MCNC: 0.8 MG/DL (ref 0.6–1.1)
GLOBULIN SER CALC-MCNC: 3.4 G/DL (ref 2.3–3.5)
GLUCOSE SERPL-MCNC: 110 MG/DL (ref 70–99)
POTASSIUM SERPL-SCNC: 3.9 MMOL/L (ref 3.5–5.1)
PROT SERPL-MCNC: 6.9 G/DL (ref 6.3–8.2)
SODIUM SERPL-SCNC: 141 MMOL/L (ref 136–145)

## 2025-01-06 PROCEDURE — 82565 ASSAY OF CREATININE: CPT

## 2025-01-06 PROCEDURE — 70498 CT ANGIOGRAPHY NECK: CPT | Performed by: RADIOLOGY

## 2025-01-06 PROCEDURE — 70496 CT ANGIOGRAPHY HEAD: CPT | Performed by: RADIOLOGY

## 2025-01-06 PROCEDURE — 70496 CT ANGIOGRAPHY HEAD: CPT

## 2025-01-06 PROCEDURE — 6360000004 HC RX CONTRAST MEDICATION: Performed by: INTERNAL MEDICINE

## 2025-01-06 RX ORDER — IOPAMIDOL 755 MG/ML
100 INJECTION, SOLUTION INTRAVASCULAR
Status: COMPLETED | OUTPATIENT
Start: 2025-01-06 | End: 2025-01-06

## 2025-01-06 RX ADMIN — IOPAMIDOL 100 ML: 755 INJECTION, SOLUTION INTRAVENOUS at 11:16

## 2025-01-06 NOTE — TELEPHONE ENCOUNTER
----- Message from Dr. Derian Welsh MD sent at 1/6/2025 12:34 PM EST -----  No major abnormalities.  Continue current meds without change and keep routine followup.

## 2025-02-04 ENCOUNTER — HOSPITAL ENCOUNTER (OUTPATIENT)
Dept: PHYSICAL THERAPY | Age: 89
Setting detail: RECURRING SERIES
End: 2025-02-04
Payer: MEDICARE

## 2025-02-06 ENCOUNTER — TELEPHONE (OUTPATIENT)
Age: 89
End: 2025-02-06

## 2025-02-06 NOTE — TELEPHONE ENCOUNTER
Pt is calling wanted to let Dr Welsh Know she had labs drawn today with Dr Araujo .any questions please call pt

## 2025-02-06 NOTE — TELEPHONE ENCOUNTER
Labs look good from a cardiovascular standpoint.  Continue meds, healthy diet lifestyle and keep routine follow-up.

## 2025-02-11 ENCOUNTER — HOSPITAL ENCOUNTER (OUTPATIENT)
Dept: PHYSICAL THERAPY | Age: 89
Setting detail: RECURRING SERIES
Discharge: HOME | End: 2025-02-14
Payer: MEDICARE

## 2025-02-11 PROCEDURE — 92523 SPEECH SOUND LANG COMPREHEN: CPT

## 2025-02-11 NOTE — THERAPY EVALUATION
refresher of therapeutic recommendations, establish home exercise programs, and compensatory strategies.     Problem List: (Impacting functional limitations):    Apraxia, expressive language impairments.  Therapy Prognosis: Good  RECOMMENDATIONS   Recommendations:    Yes. Speech therapy services are clinically indicated at this time to address apraxia and expressive language impairments      PLAN    Interventions Planned (Treatment may consist of any combination of the following):    Motor speech based treatment, Expressive language, Training in compensatory strategies, and Education  Goals: (Goals have been discussed and agreed upon with patient.)  Short-Term Functional Goals: Time Frame: 6 weeks  Patient will fill in carrier phrase/complete automatic speech tasks using appropriate breath support with >80% accuracy given mod cues.   Patient will use appropriate breath support and coordination with phonation at phrase/sentence/paragraph level to 70%, with mod cues, to increase functional speech intelligibility.  Patient will produce 3-5 syllable words with articulatory precision with 80% accuracy given min cues.   Patient will implement trained strategies (slow rate, loud, over-articulation, pacing) with correct articulation with functional phrases and sentences with 80% accuracy given mod cues.   Patient will participate in 5 minute conversation with no more than 5 cues for use of strategies (slow rate, loud, over-articulation, pacing) to communicate effectively.   Patient will report at least one situation in which trained strategies were utilized outside of therapy to improve communication each therapy session.   Discharge Goals: Time Frame: 12 weeks  Patient will develop functional and intelligible speech and utilize compensatory strategies through the use of increased articulatory precision and speech prosody for effective communication at the modified independent level.   Patient will report reduced difficulty

## 2025-02-13 ENCOUNTER — TELEPHONE (OUTPATIENT)
Age: 89
End: 2025-02-13

## 2025-02-13 NOTE — TELEPHONE ENCOUNTER
OUT OF   Eliquis 2.5mg  Wants to get samples until [facility] sends RX    in [facility]

## 2025-02-18 ENCOUNTER — HOSPITAL ENCOUNTER (OUTPATIENT)
Dept: PHYSICAL THERAPY | Age: 89
Setting detail: RECURRING SERIES
Discharge: HOME | End: 2025-02-21
Payer: MEDICARE

## 2025-02-18 PROCEDURE — 92507 TX SP LANG VOICE COMM INDIV: CPT

## 2025-02-18 NOTE — PROGRESS NOTES
Jacqueline Weaver  : 1935  Primary: Saleem Umana Plus Hmo  Secondary:  [facility] @ [address]  [facility] [address]  Phone: [phone]  Fax: [phone]    Effective Dates:    2025 TO 2025   Frequency/Duration    1-2 time(s) a week for 60-90 days  SLP Visit Info:  Canceled Appointment: 0  Total # of Visits to Date: 1        OUTPATIENT SPEECH PATHOLOGY NOTE:Daily Note 2025  Appt Desk   Episode  Charges Attendance Report   Events        Treatment Diagnosis:    Apraxia following cerebral infarction  Dysarthria following cerebral infarction  Medical/Referring Diagnosis:    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  Pain:  Patient does not c/o pain.  Interventions Planned: (Treatment may consist of any combination of the following)        See Assessment Note  GOALS:  (Goals have been discussed and agreed upon with patient.)  Short-Term Functional Goals: Time Frame: 6 weeks  Patient will fill in carrier phrase/complete automatic speech tasks using appropriate breath support with >80% accuracy given mod cues.   Patient will use appropriate breath support and coordination with phonation at phrase/sentence/paragraph level to 70%, with mod cues, to increase functional speech intelligibility.  Patient will produce 3-5 syllable words with articulatory precision with 80% accuracy given min cues.   Patient will implement trained strategies (slow rate, loud, over-articulation, pacing) with correct articulation with functional phrases and sentences with 80% accuracy given mod cues.   Patient will participate in 5 minute conversation with no more than 5 cues for use of strategies (slow rate, loud, over-articulation,

## 2025-02-25 ENCOUNTER — HOSPITAL ENCOUNTER (OUTPATIENT)
Dept: PHYSICAL THERAPY | Age: 89
Setting detail: RECURRING SERIES
Discharge: HOME | End: 2025-02-28
Payer: MEDICARE

## 2025-02-25 PROCEDURE — 92507 TX SP LANG VOICE COMM INDIV: CPT

## 2025-02-25 NOTE — PROGRESS NOTES
topic/task. Apraxia present with inconsistent errors given 3-4 syllable targets, benefits from written cues and tapping out syllables to slow rate and smiley syllables correctly.  Communication/Consultation:  None today  Recommendations/Intent for next treatment session: Next visit will focus on goals.    Total Duration:  Time In: 1118  Time Out: 1200  Minutes: 42    Keiko Joens, SLP

## 2025-02-28 ENCOUNTER — OFFICE VISIT (OUTPATIENT)
Dept: ENT CLINIC | Age: 89
End: 2025-02-28

## 2025-02-28 VITALS — WEIGHT: 123.2 LBS | BODY MASS INDEX: 21.03 KG/M2 | HEIGHT: 64 IN | RESPIRATION RATE: 12 BRPM

## 2025-02-28 DIAGNOSIS — H61.21 IMPACTED CERUMEN, RIGHT EAR: Primary | ICD-10-CM

## 2025-02-28 ASSESSMENT — ENCOUNTER SYMPTOMS
EYES NEGATIVE: 1
ALLERGIC/IMMUNOLOGIC NEGATIVE: 1
GASTROINTESTINAL NEGATIVE: 1
RESPIRATORY NEGATIVE: 1

## 2025-02-28 NOTE — PROGRESS NOTES
septum is midline without perforations, nasal mucosa appears healthy with no erythema, mucopurulence, or polyps.  Mouth: Moist mucus membranes, normal tongue/palate mobility, no concerning mucosal lesions. Oropharynx clear with no erythema/exudate, no tonsillar hypertrophy.  Ears: Normal appearing auricles, no hematomas.  There is complete cerumen impaction on the right side, left EAC is clear, healthy canal skin, TM's intact with no perforations or retraction pockets. No middle ear effusions.  Neck: Soft, supple, no palpable neck masses. No palpable parotid or submandibular masses. No thyromegaly or palpable thyroid nodules.   Lymphatics: No palpable cervical LAD.  Resp: No audible stridor or wheezing.  CV: No murmurs, no JVD.  Extremities: No clubbing or cyanosis.    PROCEDURE: Cerumen removal under binocular microscopy  INDICATIONS: Cerumen impaction  DESCRIPTION: After verbal consent was obtained and a timeout was performed, the otologic microscope was used to visualize both ears. There were normal appearing auricles bilaterally. There was cerumen impaction on R side only. I cleaned out both ears under the scope w/ a right angle hook and otologic suctions. After cleaning, the ear canal skin was healthy and both TMs were intact w/ no perforations. Both middle ear spaces were clear w/ no effusions. The patient tolerated the procedure well and there were no complications.      ASSESSMENT and PLAN      ICD-10-CM    1. Impacted cerumen, right ear  H61.21 REMOVE IMPACTED EAR WAX          She had complete cerumen impaction on the right side, and I cleaned out her ears today under the microscope.  I wished her safe travels down to [place] next week for her family vacation.  RTC in 3 months for another ear cleaning.    Zachary Bashir MD  2/28/2025    Electronically signed by Zachary Bashir MD on 2/28/2025 at 9:44 AM

## 2025-03-04 ENCOUNTER — HOSPITAL ENCOUNTER (OUTPATIENT)
Dept: PHYSICAL THERAPY | Age: 89
Setting detail: RECURRING SERIES
Discharge: HOME | End: 2025-03-07
Payer: MEDICARE

## 2025-03-04 PROCEDURE — 92507 TX SP LANG VOICE COMM INDIV: CPT

## 2025-03-04 NOTE — PROGRESS NOTES
Jacqueline P Meg  : 1935  Primary: Saleem Umana Plus Hmo  Secondary:  [facility] @ [address]  [facility] [address]  Phone: [phone]  Fax: [phone]    Effective Dates:    2025 TO 2025   Frequency/Duration    1-2 time(s) a week for 60-90 days  SLP Visit Info:  Canceled Appointment: 0  Total # of Visits to Date: 3   Patient states she will be on vacation until 3/24     OUTPATIENT SPEECH PATHOLOGY NOTE:Daily Note 3/4/2025  Appt Desk   Episode  Charges Attendance Report   Events        Treatment Diagnosis:    Apraxia following cerebral infarction  Dysarthria following cerebral infarction  Medical/Referring Diagnosis:    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 States she is leaving for a trip and will call when she returns for further therapy scheduling.  Pain:  Patient does not c/o pain.  Interventions Planned: (Treatment may consist of any combination of the following)        See Assessment Note  GOALS:  (Goals have been discussed and agreed upon with patient.)  Short-Term Functional Goals: Time Frame: 6 weeks  Patient will fill in carrier phrase/complete automatic speech tasks using appropriate breath support with >80% accuracy given mod cues.   Patient will use appropriate breath support and coordination with phonation at phrase/sentence/paragraph level to 70%, with mod cues, to increase functional speech intelligibility.  Patient will produce 3-5 syllable words with articulatory precision with 80% accuracy given min cues.   Patient will implement trained strategies (slow rate, loud, over-articulation, pacing) with correct

## 2025-03-11 ENCOUNTER — APPOINTMENT (OUTPATIENT)
Dept: PHYSICAL THERAPY | Age: 89
End: 2025-03-11
Payer: MEDICARE

## 2025-04-01 ENCOUNTER — HOSPITAL ENCOUNTER (OUTPATIENT)
Dept: PHYSICAL THERAPY | Age: 89
Setting detail: RECURRING SERIES
Discharge: HOME | End: 2025-04-04
Payer: MEDICARE

## 2025-04-01 PROCEDURE — 92507 TX SP LANG VOICE COMM INDIV: CPT

## 2025-04-01 NOTE — PROGRESS NOTES
Jacqueline Weaver  : 1935  Primary: Saleem Umana Plus Hmo  Secondary:  [facility] @ [address]  [facility] [address]  Phone: [phone]  Fax: [phone]    Effective Dates:    2025 TO 2025   Frequency/Duration    1-2 time(s) a week for 60-90 days  SLP Visit Info:  Canceled Appointment: 0  Total # of Visits to Date: 4        OUTPATIENT SPEECH PATHOLOGY NOTE:Daily Note 2025  Appt Desk   Episode  Charges Attendance Report   Events        Treatment Diagnosis:    Apraxia following cerebral infarction  Dysarthria following cerebral infarction  Medical/Referring Diagnosis:    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 This felt easier than when talking to others at bridge.   Pain:  Patient does not c/o pain.  Interventions Planned: (Treatment may consist of any combination of the following)        See Assessment Note  GOALS:  (Goals have been discussed and agreed upon with patient.)  Short-Term Functional Goals: Time Frame: 6 weeks  Patient will fill in carrier phrase/complete automatic speech tasks using appropriate breath support with >80% accuracy given mod cues.   Patient will use appropriate breath support and coordination with phonation at phrase/sentence/paragraph level to 70%, with mod cues, to increase functional speech intelligibility.  Patient will produce 3-5 syllable words with articulatory precision with 80% accuracy given min cues.   Patient will implement trained strategies (slow rate, loud, over-articulation, pacing) with correct articulation with functional phrases and sentences with 80% accuracy given mod

## 2025-04-10 ENCOUNTER — HOSPITAL ENCOUNTER (OUTPATIENT)
Dept: PHYSICAL THERAPY | Age: 89
Setting detail: RECURRING SERIES
Discharge: HOME | End: 2025-04-13
Payer: MEDICARE

## 2025-04-10 PROCEDURE — 92507 TX SP LANG VOICE COMM INDIV: CPT

## 2025-04-10 NOTE — PROGRESS NOTES
Jacqueline P Meg  : 1935  Primary: Saleem Umana Plus Hmo  Secondary:  [facility] @ [address]  [facility] [address]  Phone: [phone]  Fax: [phone]    Effective Dates:    2025 TO 2025   Frequency/Duration    1-2 time(s) a week for 60-90 days  SLP Visit Info:  Canceled Appointment: 0  Total # of Visits to Date: 5        OUTPATIENT SPEECH PATHOLOGY NOTE:Daily Note 4/10/2025  Appt Desk   Episode  Charges Attendance Report   Events        Treatment Diagnosis:    Apraxia following cerebral infarction  Dysarthria following cerebral infarction  Medical/Referring Diagnosis:    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  Pain:  Patient does not c/o pain.  Interventions Planned: (Treatment may consist of any combination of the following)        See Assessment Note  GOALS:  (Goals have been discussed and agreed upon with patient.)  Short-Term Functional Goals: Time Frame: 6 weeks  Patient will fill in carrier phrase/complete automatic speech tasks using appropriate breath support with >80% accuracy given mod cues.   Patient will use appropriate breath support and coordination with phonation at phrase/sentence/paragraph level to 70%, with mod cues, to increase functional speech intelligibility.  Patient will produce 3-5 syllable words with articulatory precision with 80% accuracy given min cues.   Patient will implement trained strategies (slow rate, loud, over-articulation, pacing) with correct articulation with functional phrases and sentences with 80% accuracy given mod cues.   Patient will participate in 5 minute conversation with no more than 5 cues for  Advancement Flap (Double) Text: The defect edges were debeveled with a #15 scalpel blade.  Given the location of the defect and the proximity to free margins a double advancement flap was deemed most appropriate.  Using a sterile surgical marker, the appropriate advancement flaps were drawn incorporating the defect and placing the expected incisions within the relaxed skin tension lines where possible.    The area thus outlined was incised deep to adipose tissue with a #15 scalpel blade.  The skin margins were undermined to an appropriate distance in all directions utilizing iris scissors.

## 2025-04-15 ENCOUNTER — HOSPITAL ENCOUNTER (OUTPATIENT)
Dept: PHYSICAL THERAPY | Age: 89
Setting detail: RECURRING SERIES
Discharge: HOME | End: 2025-04-18
Payer: MEDICARE

## 2025-04-15 PROCEDURE — 92507 TX SP LANG VOICE COMM INDIV: CPT

## 2025-04-15 NOTE — PROGRESS NOTES
Jacqueline Weaver  : 1935  Primary: Saleem Umana Plus Hmo  Secondary:  [facility] @ [facility]  [address]  [facility] [address]  Phone: [phone]  Fax: [phone]    Effective Dates:    2025 TO 2025   Frequency/Duration    1-2 time(s) a week for 60-90 days  SLP Visit Info:  Canceled Appointment: 0  Total # of Visits to Date: 6        OUTPATIENT SPEECH PATHOLOGY NOTE:Daily Note 4/15/2025  Appt Desk   Episode  Charges Attendance Report   Events        Treatment Diagnosis:    Apraxia following cerebral infarction  Dysarthria following cerebral infarction  Medical/Referring Diagnosis:    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  Pain:  Patient does not c/o pain.  Interventions Planned: (Treatment may consist of any combination of the following)        See Assessment Note  GOALS:  (Goals have been discussed and agreed upon with patient.)  Short-Term Functional Goals: Time Frame: 6 weeks  Patient will fill in carrier phrase/complete automatic speech tasks using appropriate breath support with >80% accuracy given mod cues.   Patient will use appropriate breath support and coordination with phonation at phrase/sentence/paragraph level to 70%, with mod cues, to increase functional speech intelligibility.  Patient will produce 3-5 syllable words with articulatory precision with 80% accuracy given min cues.   Patient will implement trained strategies (slow rate, loud, over-articulation, pacing) with correct articulation with functional phrases and sentences with 80% accuracy given mod cues.   Patient will participate in 5 minute conversation with no more than 5 cues for use of

## 2025-04-21 ASSESSMENT — ENCOUNTER SYMPTOMS
NAUSEA: 0
WHEEZING: 0
PHOTOPHOBIA: 0
COUGH: 0
DIARRHEA: 0
CONSTIPATION: 0
SHORTNESS OF BREATH: 0
ABDOMINAL PAIN: 0
VOMITING: 0
ABDOMINAL DISTENTION: 0

## 2025-04-21 NOTE — PROGRESS NOTES
[facility] CARDIOLOGY  [address] [place] [address], [address]  [place] [address]  PHONE: [phone]        NAME:  Jacqueline Weaver  : 1935  MRN: [id number]     PCP:  Erich Vance APRN - NP    SUBJECTIVE:   Jacqueline Weaver is a 89 y.o. female seen for a follow up visit regarding the following:     Chief Complaint   Patient presents with    Cerebrovascular Accident        HPI:  She presented recently to establish new cardiac care with a history of recent admission to [facility] on 2023 with an acute left MCA M1 occlusion acute onset right hemiplegia, left gaze preference and aphasia.  Interventional radiology assisted with mechanical thrombectomy, recanalization and reperfusion.    ECG :  Atrial fibrillation   ? atrial activity   Left anterior fascicular block   axis(240,-40), init forces inf   Probable left ventricular hypertrophy   (RaVL+SV3)xQRSd >300   Borderline prolonged QT interval   QTc >485mS     Tesha echo :    Normal left ventricle cavity size.     The left ventricular systolic function is normal (55-65%).     There is mild concentric left ventricular hypertrophy present.     Unable to assess left ventricular diastolic function.     Elevated left atrial pressure.     The right ventricular systolic function is normal.     The left atrium is severely dilated.     Mild mitral valve regurgitation.     Mild tricuspid valve regurgitation.     She was completely asymptomatic prior to the onset of her stroke symptoms with no angina, CHF, palpitations, presyncope or syncope.  She has no prior history of A-fib and was unaware that she was in A-fib at the time of her CVA.  Duration of the arrhythmia is unknown, not permanent A-fib by definition now.  She is rate controlled at baseline in the absence of any rate slowing meds and compliant with Eliquis with no missed doses and no bleeding issues.       Holter 7/3/23:  A-fib throughout.  Intermittently bradycardic

## 2025-04-22 ENCOUNTER — APPOINTMENT (OUTPATIENT)
Dept: PHYSICAL THERAPY | Age: 89
End: 2025-04-22
Payer: MEDICARE

## 2025-04-25 ENCOUNTER — OFFICE VISIT (OUTPATIENT)
Age: 89
End: 2025-04-25
Payer: MEDICARE

## 2025-04-25 VITALS
BODY MASS INDEX: 20.81 KG/M2 | HEART RATE: 72 BPM | HEIGHT: 64 IN | DIASTOLIC BLOOD PRESSURE: 80 MMHG | SYSTOLIC BLOOD PRESSURE: 125 MMHG | WEIGHT: 121.9 LBS

## 2025-04-25 DIAGNOSIS — I63.412 CEREBROVASCULAR ACCIDENT (CVA) DUE TO EMBOLISM OF LEFT MIDDLE CEREBRAL ARTERY (HCC): ICD-10-CM

## 2025-04-25 DIAGNOSIS — I10 PRIMARY HYPERTENSION: ICD-10-CM

## 2025-04-25 DIAGNOSIS — R94.31 ABNORMAL EKG: ICD-10-CM

## 2025-04-25 DIAGNOSIS — R00.1 BRADYCARDIA: ICD-10-CM

## 2025-04-25 DIAGNOSIS — I48.11 LONGSTANDING PERSISTENT ATRIAL FIBRILLATION (HCC): Primary | ICD-10-CM

## 2025-04-25 PROCEDURE — 1123F ACP DISCUSS/DSCN MKR DOCD: CPT | Performed by: INTERNAL MEDICINE

## 2025-04-25 PROCEDURE — 1036F TOBACCO NON-USER: CPT | Performed by: INTERNAL MEDICINE

## 2025-04-25 PROCEDURE — G8420 CALC BMI NORM PARAMETERS: HCPCS | Performed by: INTERNAL MEDICINE

## 2025-04-25 PROCEDURE — 99214 OFFICE O/P EST MOD 30 MIN: CPT | Performed by: INTERNAL MEDICINE

## 2025-04-25 PROCEDURE — 1126F AMNT PAIN NOTED NONE PRSNT: CPT | Performed by: INTERNAL MEDICINE

## 2025-04-25 PROCEDURE — 1160F RVW MEDS BY RX/DR IN RCRD: CPT | Performed by: INTERNAL MEDICINE

## 2025-04-25 PROCEDURE — 1159F MED LIST DOCD IN RCRD: CPT | Performed by: INTERNAL MEDICINE

## 2025-04-25 PROCEDURE — G8427 DOCREV CUR MEDS BY ELIG CLIN: HCPCS | Performed by: INTERNAL MEDICINE

## 2025-04-25 PROCEDURE — 1090F PRES/ABSN URINE INCON ASSESS: CPT | Performed by: INTERNAL MEDICINE

## 2025-04-25 RX ORDER — AMLODIPINE BESYLATE 5 MG/1
5 TABLET ORAL DAILY
COMMUNITY
Start: 2025-02-06

## 2025-05-01 NOTE — PROGRESS NOTES
Jacqueline P ritus  : 1935  Primary: Saleem Umana Plus Hmo  Secondary:  [facility] @ [facility]  [address]  [facility] [address]  Phone: [phone]  Fax: [phone]    Effective Dates:    2025 TO 2025   Frequency/Duration    1-2 time(s) a week for 60-90 days  SLP Visit Info:  Canceled Appointment: 0  Total # of Visits to Date: 7   (8 including eval)     OUTPATIENT SPEECH PATHOLOGY NOTE:Daily Note 2025  Appt Desk   Episode  Charges Attendance Report   Events        Treatment Diagnosis:    Apraxia following cerebral infarction  Dysarthria following cerebral infarction  Medical/Referring Diagnosis:    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 Had to cancel last week's session d/t being out of town.   Pain:  Patient does not c/o pain.  Interventions Planned: (Treatment may consist of any combination of the following)        See Assessment Note  GOALS:  (Goals have been discussed and agreed upon with patient.)  Short-Term Functional Goals: Time Frame: 6 weeks  Patient will fill in carrier phrase/complete automatic speech tasks using appropriate breath support with >80% accuracy given mod cues.   Patient will use appropriate breath support and coordination with phonation at phrase/sentence/paragraph level to 70%, with mod cues, to increase functional speech intelligibility.  Patient will produce 3-5 syllable words with articulatory precision with 80% accuracy given min cues.   Patient will implement trained strategies (slow rate, loud, over-articulation, pacing) with correct articulation with functional phrases and sentences with 80% accuracy given mod cues.   Patient will

## 2025-05-02 ENCOUNTER — HOSPITAL ENCOUNTER (OUTPATIENT)
Dept: PHYSICAL THERAPY | Age: 89
Setting detail: RECURRING SERIES
Discharge: HOME | End: 2025-05-05
Payer: MEDICARE

## 2025-05-02 PROCEDURE — 92507 TX SP LANG VOICE COMM INDIV: CPT

## 2025-05-13 ENCOUNTER — APPOINTMENT (OUTPATIENT)
Dept: PHYSICAL THERAPY | Age: 89
End: 2025-05-13
Payer: MEDICARE

## 2025-05-13 ENCOUNTER — HOSPITAL ENCOUNTER (OUTPATIENT)
Dept: PHYSICAL THERAPY | Age: 89
Setting detail: RECURRING SERIES
Discharge: HOME | End: 2025-05-16
Payer: MEDICARE

## 2025-05-13 PROCEDURE — 92507 TX SP LANG VOICE COMM INDIV: CPT

## 2025-05-13 NOTE — THERAPY RECERTIFICATION
Jacqueline Weaver  : 1935  Primary: [employer] [facility]  Secondary:  [facility] @ [address]  [facility] [address]  Phone: [phone]  Fax: [phone]    Visit Info:    Effective Dates  2025 to 2025  Frequency/Duration    1 time(s) a week for 30-60 days   SPEECH LANGUAGE PATHOLOGY: COMMUNICATION    Recertification 2025     Appt Desk   Episode  Charges Attendance Report   Events        Treatment Diagnosis:    Apraxia following cerebral infarction  Dysarthria following cerebral infarction  Medical/Referring Diagnosis:   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 At that time, pt had to stop therapy with unexpected loss of her spouse. Pt reinitiating therapy at this time with c/o difficulty with communication with family and friends. States she has had a harder time since her  has passed, and feels her speech has remained the same.   Patient Stated Goal(s):  \"I'm not where I want to be\"    Past Medical History/Comorbidities:   Ms. Weaver  has a past medical history of Arthritis, Ear problems, Hypertension, Insomnia, Murmur, Psychiatric disorder, Status post total knee replacement, left, and Status post total right knee replacement.  Ms. Weaver  has a past surgical history that includes Colonoscopy; Knee arthroscopy (Right); Total knee arthroplasty (Right, 2018); and Cataract removal (Bilateral, 2018).  Current Communication Status:  Communication Observation:  Mild apraxia    Follows Directions: follows one step commands/direction

## 2025-05-13 NOTE — PROGRESS NOTES
Jacqueline Weaver  : 1935  Primary: [facility]  Secondary:  [facility] @ [address]  [place] [address]  Phone: [phone]  Fax: [phone]    Effective Dates:    25 to 25  Frequency/Duration    1 time(s) a week for 30-60 days  SLP Visit Info:  No Show: 1 ()  Canceled Appointment: 0  Total # of Visits to Date: 9 (inclduing eval)      OUTPATIENT SPEECH PATHOLOGY NOTE:Daily Note 2025  Appt Desk   Episode  Charges Attendance Report   Events        Treatment Diagnosis:    Apraxia following cerebral infarction  Dysarthria following cerebral infarction  Medical/Referring Diagnosis:    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   Pain:  Patient does not c/o pain.  Interventions Planned: (Treatment may consist of any combination of the following)        See Assessment Note  GOALS:(Goals have been discussed and agreed upon with patient.)  Short-Term Functional Goals: Time Frame: 6 weeks   Patient will fill in carrier phrase/complete automatic speech tasks using appropriate breath support with >80% accuracy given  min cues. (Goal met with mod cueing level, updated)  Patient will use appropriate breath support and coordination with phonation at phrase/sentence/paragraph level to 70%, with  min cues, to increase functional speech intelligibility. (Goal met with mod cueing, updated)  Patient will produce 3-5 syllable words with articulatory precision with 80% accuracy given min cues. (Goal progressing, close to meeting)  Patient will implement trained strategies (slow rate, loud, over-articulation, pacing) with correct articulation with functional phrases and sentences with 80% accuracy given mod cues.

## 2025-05-20 ENCOUNTER — APPOINTMENT (OUTPATIENT)
Dept: PHYSICAL THERAPY | Age: 89
End: 2025-05-20
Payer: MEDICARE

## 2025-05-23 ENCOUNTER — HOSPITAL ENCOUNTER (OUTPATIENT)
Dept: PHYSICAL THERAPY | Age: 89
Setting detail: RECURRING SERIES
Discharge: HOME | End: 2025-05-26
Payer: MEDICARE

## 2025-05-23 PROCEDURE — 92507 TX SP LANG VOICE COMM INDIV: CPT

## 2025-05-23 NOTE — PROGRESS NOTES
and sentences with 80% accuracy given mod cues. (Goal progressing, close to meeting)  Patient will participate in 5 minute conversation with no more than 5 cues for use of strategies (slow rate, loud, over-articulation, pacing) to communicate effectively. (Goal progressing)  Patient will report at least one situation in which trained strategies were utilized outside of therapy to improve communication each therapy session. (Goal met, though will remain in plan of care until discharge)  Discharge Goals: Time Frame: 12 weeks  Patient will develop functional and intelligible speech and utilize compensatory strategies through the use of increased articulatory precision and speech prosody for effective communication at the modified independent level.   Patient will report reduced difficulty with speech, as measured by Neuro QOL Scale- Speech Difficulties (baseline raw score: 14/30 with higher score indicating more difficult)     Updated Objective Findings:  None Today  Treatment   Motor speech   Pt able to state back understanding of compensatory strategies of SLOB slow, loud volume, over-articulation, breathe, with verbal and written instruction, 4/4 strategies with min cues  Diaphragmatic breathing exercises: 5 min, min clavicular movement, 80% efficiency, mod cues   Automatic speech tasks with coordination of respiration and phonation (counting, DOTW, MOTY): 95% accuracy with mod cues  Used coordination of respiration and phonation at phrase and sentence level with 85% acc, mod cues  Produced personally relevant/multi-syllable words with 88% accuracy, min cues  Pt produced functional phrases (ex: nir play music by nhan samano): 85% accuracy, min cues  Created sentences given target word: --  Structured conversation with strategies in place (3 minutes): 75% efficiency with min cues  Conversational scripts: --    HEP: coordination of respiration and phonation activities    Treatment/Session Summary: Pt benefits from

## 2025-05-27 ENCOUNTER — HOSPITAL ENCOUNTER (OUTPATIENT)
Dept: PHYSICAL THERAPY | Age: 89
Setting detail: RECURRING SERIES
Discharge: HOME | End: 2025-05-30
Payer: MEDICARE

## 2025-05-27 PROCEDURE — 92507 TX SP LANG VOICE COMM INDIV: CPT

## 2025-05-27 NOTE — PROGRESS NOTES
Jacqueline NADINE Weaver  : 1935  Primary: [facility]  Secondary:  [facility] @ [address]  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   Pain:  Patient does not c/o pain.  Interventions Planned: (Treatment may consist of any combination of the following)        See Assessment Note  GOALS:(Goals have been discussed and agreed upon with patient.)  Short-Term Functional Goals: Time Frame: 6 weeks   Patient will fill in carrier phrase/complete automatic speech tasks using appropriate breath support with >80% accuracy given  min cues. (Goal met with mod cueing level, updated)  Patient will use appropriate breath support and coordination with phonation at phrase/sentence/paragraph level to 70%, with  min cues, to increase functional speech intelligibility. (Goal met with mod cueing, updated)  Patient will produce 3-5 syllable words with articulatory precision with 80% accuracy given min cues. (Goal progressing, close to meeting)  Patient will implement trained strategies (slow rate, loud, over-articulation, pacing) with correct articulation with functional phrases and sentences with 80% accuracy given mod cues. (Goal

## 2025-05-30 ENCOUNTER — OFFICE VISIT (OUTPATIENT)
Dept: ENT CLINIC | Age: 89
End: 2025-05-30

## 2025-05-30 VITALS — BODY MASS INDEX: 20.32 KG/M2 | HEIGHT: 64 IN | WEIGHT: 119 LBS | RESPIRATION RATE: 10 BRPM

## 2025-05-30 DIAGNOSIS — H61.21 IMPACTED CERUMEN, RIGHT EAR: Primary | ICD-10-CM

## 2025-05-30 ASSESSMENT — ENCOUNTER SYMPTOMS
ALLERGIC/IMMUNOLOGIC NEGATIVE: 1
GASTROINTESTINAL NEGATIVE: 1
RESPIRATORY NEGATIVE: 1
EYES NEGATIVE: 1

## 2025-05-30 NOTE — PROGRESS NOTES
Chief Complaint   Patient presents with    Follow-up     3 mos ear cleaning        HPI:  Jacqueline Weaver is a 89 y.o. female seen in follow-up for her ears. She has a long history of cerumen impaction and requires frequent ear cleanings. I last saw her back on 2/28/25.  Doing well since then, but her right ear does feel stopped up.  She denies any otalgia or otorrhea.  She is still working with SLP after her recent CVA, and still having some issues with communication.  She does better as long as she tries to talk slowly.    Past Medical History, Past Surgical History, Family history, Social History, and Medications were all reviewed with the patient today and updated as necessary.     Allergies   Allergen Reactions    Hydrocodone Other (See Comments)     \"too strong\"    Penicillins Itching    Sulfa Antibiotics Itching     Patient Active Problem List   Diagnosis    Abnormal EKG    Osteoarthritis of left knee    Status post total knee replacement, left    Bradycardia    Diaphoresis    Status post total right knee replacement    Elevated blood pressure reading    Malaise and fatigue    Longstanding persistent atrial fibrillation (HCC)    Primary hypertension    Cerebrovascular accident (CVA) due to embolism of left middle cerebral artery (HCC)     Current Outpatient Medications   Medication Sig    amLODIPine (NORVASC) 5 MG tablet Take 1 tablet by mouth daily    rosuvastatin (CRESTOR) 5 MG tablet Take 1 tablet by mouth daily    losartan (COZAAR) 100 MG tablet Take 1 tablet by mouth daily    fluorouracil (EFUDEX) 5 % cream as needed    ketoconazole (NIZORAL) 2 % cream as needed    apixaban (ELIQUIS) 2.5 MG TABS tablet Take 1 tablet by mouth 2 times daily    temazepam (RESTORIL) 15 MG capsule 1 capsule at bedtime as needed Orally prn for 30 days    melatonin 3 MG TABS tablet Take 1 tablet by mouth as needed    DULoxetine (CYMBALTA) 60 MG extended release capsule Take 30 mg by mouth daily    Omega-3 Fatty Acids (FISH

## 2025-06-03 ENCOUNTER — HOSPITAL ENCOUNTER (OUTPATIENT)
Dept: PHYSICAL THERAPY | Age: 89
Setting detail: RECURRING SERIES
Discharge: HOME | End: 2025-06-06
Payer: MEDICARE

## 2025-06-03 ENCOUNTER — APPOINTMENT (OUTPATIENT)
Dept: PHYSICAL THERAPY | Age: 89
End: 2025-06-03
Payer: MEDICARE

## 2025-06-03 PROCEDURE — 92507 TX SP LANG VOICE COMM INDIV: CPT

## 2025-06-03 NOTE — PROGRESS NOTES
Jacqueline P Meg  : 1935  Primary: [facility]  Secondary:  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   Pain:  Patient does not c/o pain.  Interventions Planned: (Treatment may consist of any combination of the following)        See Assessment Note  GOALS:(Goals have been discussed and agreed upon with patient.)  Short-Term Functional Goals: Time Frame: 6 weeks   Patient will fill in carrier phrase/complete automatic speech tasks using appropriate breath support with >80% accuracy given  min cues. (Goal met with mod cueing level, updated)  Patient will use appropriate breath support and coordination with phonation at phrase/sentence/paragraph level to 70%, with  min cues, to increase functional speech intelligibility. (Goal met with mod cueing, updated)  Patient will produce 3-5 syllable words with articulatory precision with 80% accuracy given min cues. (Goal progressing, close to meeting)  Patient will implement trained strategies (slow rate, loud, over-articulation, pacing) with correct articulation with functional phrases and sentences with 80% accuracy given mod cues. (Goal progressing,

## 2025-06-03 NOTE — THERAPY DISCHARGE
5 cues for use of strategies (slow rate, loud, over-articulation, pacing) to communicate effectively. (Goal progressing) (goal met)  Patient will report at least one situation in which trained strategies were utilized outside of therapy to improve communication each therapy session. (Goal met)  Discharge Goals: Time Frame: 12 weeks  Patient will develop functional and intelligible speech and utilize compensatory strategies through the use of increased articulatory precision and speech prosody for effective communication at the modified independent level.  (Goal met)  Patient will report reduced difficulty with speech, as measured by Neuro QOL Scale- Speech Difficulties (updated 6/3/25: 11/30; baseline raw score: 14/30 with higher score indicating more difficult)  (Goal met)       Education:   Patient educated on Results of evaluation, Speech therapy recommendations, Role of speech therapy, and SLP plan  Education provided to Patient  Education response: Demonstrates understanding     Outcome Measure:   MOTOR SPEECH: Level 5:  The individual is able to speak intelligibly using simple sentences in daily routine activities with both familiar and unfamiliar communication partners. The individual occasionally requires minimal cueing to produce more complex sentences/messages in routine activities, although accuracy may vary and the individual may occasionally use compensatory strategies.   Initial: 4   SPOKEN LANGUAGE EXPRESSION: Level 6:  The individual is successfully able to communicate in most activities, but some limitations in spoken language are still apparent in vocational, avocational and social activities. The individual rarely requires minimal cueing to frame complex sentences/messages. The individual usually self-cues when encountering difficulty.   Initial: 5     Medical Necessity/Other Comments:   No further speech therapy indicated at this time.     Total Duration:  Time In: 1030  Time Out: 1110  Minutes:

## 2025-06-11 ENCOUNTER — OFFICE VISIT (OUTPATIENT)
Dept: ENT CLINIC | Age: 89
End: 2025-06-11
Payer: MEDICARE

## 2025-06-11 VITALS — BODY MASS INDEX: 20.32 KG/M2 | RESPIRATION RATE: 10 BRPM | WEIGHT: 119.05 LBS | HEIGHT: 64 IN

## 2025-06-11 DIAGNOSIS — H92.21 BLOOD IN RIGHT EAR CANAL: Primary | ICD-10-CM

## 2025-06-11 PROCEDURE — 1090F PRES/ABSN URINE INCON ASSESS: CPT | Performed by: OTOLARYNGOLOGY

## 2025-06-11 PROCEDURE — 1159F MED LIST DOCD IN RCRD: CPT | Performed by: OTOLARYNGOLOGY

## 2025-06-11 PROCEDURE — G8420 CALC BMI NORM PARAMETERS: HCPCS | Performed by: OTOLARYNGOLOGY

## 2025-06-11 PROCEDURE — 1036F TOBACCO NON-USER: CPT | Performed by: OTOLARYNGOLOGY

## 2025-06-11 PROCEDURE — G8427 DOCREV CUR MEDS BY ELIG CLIN: HCPCS | Performed by: OTOLARYNGOLOGY

## 2025-06-11 PROCEDURE — 99213 OFFICE O/P EST LOW 20 MIN: CPT | Performed by: OTOLARYNGOLOGY

## 2025-06-11 PROCEDURE — 1123F ACP DISCUSS/DSCN MKR DOCD: CPT | Performed by: OTOLARYNGOLOGY

## 2025-06-11 NOTE — PROGRESS NOTES
60 MG extended release capsule Take 30 mg by mouth daily    Omega-3 Fatty Acids (FISH OIL) 1200 MG CAPS Take by mouth Twice a Week    Multiple Vitamins-Minerals (THERAPEUTIC MULTIVITAMIN-MINERALS) tablet Take 1 tablet by mouth daily    vitamin D3 (CHOLECALCIFEROL) 25 MCG (1000 UT) TABS tablet Take 1 tablet by mouth daily    calcium carbonate 600 MG TABS tablet Take 2 tablets by mouth daily     No current facility-administered medications for this visit.     Past Medical History:   Diagnosis Date    Arthritis     osteo    Ear problems     Hypertension     on med for control     Insomnia     Murmur     Murmur (Soft CARA RUSB, Soft TR murmur) heard per cardiology office note dated 05/22/18 (Dr. Welsh)    Psychiatric disorder     anxiety- citalopram daily    Status post total knee replacement, left 2/4/2019    Status post total right knee replacement 1/24/2018     Social History     Tobacco Use    Smoking status: Never     Passive exposure: Never    Smokeless tobacco: Never   Substance Use Topics    Alcohol use: Yes     Alcohol/week: 2.0 standard drinks of alcohol     Past Surgical History:   Procedure Laterality Date    CATARACT REMOVAL Bilateral 2018    COLONOSCOPY      KNEE ARTHROSCOPY Right     TOTAL KNEE ARTHROPLASTY Right 01/24/2018     Family History   Problem Relation Age of Onset    Cancer Mother     Heart Disease Father         ROS:    Review of Systems   Constitutional: Negative.    HENT: Negative.     Eyes: Negative.    Respiratory: Negative.     Cardiovascular: Negative.    Gastrointestinal: Negative.    Endocrine: Negative.    Genitourinary: Negative.    Musculoskeletal: Negative.    Skin: Negative.    Allergic/Immunologic: Negative.    Neurological: Negative.    Hematological: Negative.    Psychiatric/Behavioral: Negative.          PHYSICAL EXAM:    Resp 10   Ht 1.626 m (5' 4.02\")   Wt 54 kg (119 lb 0.8 oz)   BMI 20.42 kg/m²     General: NAD, well-appearing  Neuro: No gross neuro deficits. No facial

## 2025-08-11 RX ORDER — APIXABAN 2.5 MG/1
2.5 TABLET, FILM COATED ORAL 2 TIMES DAILY
Qty: 180 TABLET | Refills: 3 | Status: SHIPPED | OUTPATIENT
Start: 2025-08-11

## 2025-09-05 ENCOUNTER — OFFICE VISIT (OUTPATIENT)
Dept: ENT CLINIC | Age: 89
End: 2025-09-05
Payer: MEDICARE

## 2025-09-05 VITALS — HEIGHT: 64 IN | RESPIRATION RATE: 10 BRPM | WEIGHT: 119.8 LBS | BODY MASS INDEX: 20.45 KG/M2

## 2025-09-05 DIAGNOSIS — H61.21 IMPACTED CERUMEN, RIGHT EAR: Primary | ICD-10-CM

## 2025-09-05 PROCEDURE — 69210 REMOVE IMPACTED EAR WAX UNI: CPT | Performed by: OTOLARYNGOLOGY

## 2025-09-05 ASSESSMENT — ENCOUNTER SYMPTOMS
GASTROINTESTINAL NEGATIVE: 1
EYES NEGATIVE: 1
ALLERGIC/IMMUNOLOGIC NEGATIVE: 1
RESPIRATORY NEGATIVE: 1

## (undated) DEVICE — SUTURE STRATAFIX SYMMETRIC PDS + SZ 2-0 L18IN ABSRB VLT SXPP1A403

## (undated) DEVICE — DRAPE,TOP,102X53,STERILE: Brand: MEDLINE

## (undated) DEVICE — Device

## (undated) DEVICE — SUTURE PDS II SZ 1 L96IN ABSRB VLT TP-1 L65MM 1/2 CIR Z880G

## (undated) DEVICE — 3000CC GUARDIAN II: Brand: GUARDIAN

## (undated) DEVICE — 3M™ STERI-DRAPE™ INSTRUMENT POUCH 1018: Brand: STERI-DRAPE™

## (undated) DEVICE — SYR 50ML LR LCK 1ML GRAD NSAF --

## (undated) DEVICE — SYR 10ML LUER LOK 1/5ML GRAD --

## (undated) DEVICE — SOLUTION IV 1000ML 0.9% SOD CHL

## (undated) DEVICE — SYR LR LCK 1ML GRAD NSAF 30ML --

## (undated) DEVICE — Z DISCONTINUED USE 2744636  DRESSING AQUACEL 14 IN ALG W3.5XL14IN POLYUR FLM CVR W/ HYDRCOLL

## (undated) DEVICE — SIZER SURG TIB KT DISP TRIATHLON PRECIS

## (undated) DEVICE — KIT PREP FEM CRUCE SZ 4

## (undated) DEVICE — BUTTON SWITCH PENCIL BLADE ELECTRODE, HOLSTER: Brand: EDGE

## (undated) DEVICE — SUTURE VCRL SZ 2-0 L27IN ABSRB UD L36MM CP-1 1/2 CIR REV J266H

## (undated) DEVICE — SUT ETHBND 2 30IN LR DA GRN --

## (undated) DEVICE — SOLUTION IRRIG 3000ML 0.9% SOD CHL FLX CONT 0797208] ICU MEDICAL INC]

## (undated) DEVICE — OSCILLATING TIP SAW CARTRIDGE: Brand: STRYKER PRECISION

## (undated) DEVICE — 2000CC GUARDIAN II: Brand: GUARDIAN

## (undated) DEVICE — MEDI-VAC YANKAUER SUCTION HANDLE W/BULBOUS TIP: Brand: CARDINAL HEALTH

## (undated) DEVICE — CURETTE BNE CEM 10IN DISP --

## (undated) DEVICE — TRAY PREP DRY W/ PREM GLV 2 APPL 6 SPNG 2 UNDPD 1 OVERWRAP

## (undated) DEVICE — FAN SPRAY KIT: Brand: PULSAVAC®

## (undated) DEVICE — SUTURE ABSRB X-1 REV CUT 1/2 CIR 22MM UD BRAID 27IN SZ 3-0 J458H

## (undated) DEVICE — BIPOLAR SEALER 23-112-1 AQM 6.0: Brand: AQUAMANTYS ®

## (undated) DEVICE — IMPLANTABLE DEVICE
Type: IMPLANTABLE DEVICE | Site: KNEE | Status: NON-FUNCTIONAL
Removed: 2019-02-04

## (undated) DEVICE — SYRINGE CATH TIP 50ML

## (undated) DEVICE — REM POLYHESIVE ADULT PATIENT RETURN ELECTRODE: Brand: VALLEYLAB

## (undated) DEVICE — SOLUTION IV DEXTROSE/SALINE 5%-0.9% 500ML - 500ML

## (undated) DEVICE — TRAY CATH 16F DRN BG LTX -- CONVERT TO ITEM 363158

## (undated) DEVICE — T4 HOOD

## (undated) DEVICE — SKIN STAPLER: Brand: SIGNET

## (undated) DEVICE — BANDAGE COMPR SELF ADH 5 YDX4 IN TAN STRL PREMIERPRO LF

## (undated) DEVICE — BLADE SAW PAT RMR PILT H 38MM --

## (undated) DEVICE — (D)PREP SKN CHLRAPRP APPL 26ML -- CONVERT TO ITEM 371833

## (undated) DEVICE — HANDPIECE SET WITH COAXIAL HIGH FLOW TIP AND SUCTION TUBE: Brand: INTERPULSE

## (undated) DEVICE — X-LARGE COTTON GLOVE: Brand: DEROYAL

## (undated) DEVICE — GOWN,REINF,POLY,ECL,PP SLV,XL: Brand: MEDLINE

## (undated) DEVICE — PACK PROCEDURE SURG TOT KNEE

## (undated) DEVICE — SUTURE VCRL SZ 1 L27IN ABSRB UD L36MM CP-1 1/2 CIR REV CUT J268H

## (undated) DEVICE — SLIM BODY SKIN STAPLER: Brand: APPOSE ULC

## (undated) DEVICE — PRECISION FALCON OSCILLATING TIP SAW CARTRIDGE: Brand: PRECISION FALCON